# Patient Record
Sex: MALE | Race: BLACK OR AFRICAN AMERICAN | HISPANIC OR LATINO | Employment: STUDENT | URBAN - METROPOLITAN AREA
[De-identification: names, ages, dates, MRNs, and addresses within clinical notes are randomized per-mention and may not be internally consistent; named-entity substitution may affect disease eponyms.]

---

## 2017-08-19 ENCOUNTER — HOSPITAL ENCOUNTER (EMERGENCY)
Facility: HOSPITAL | Age: 22
Discharge: PSYCHIATRIC OR SUBSTANCE USE FACILITY | End: 2017-08-20
Attending: EMERGENCY MEDICINE
Payer: MEDICAID

## 2017-08-19 DIAGNOSIS — F41.1 GENERALIZED ANXIETY DISORDER: Primary | ICD-10-CM

## 2017-08-19 LAB
ALBUMIN SERPL BCP-MCNC: 4.9 G/DL
ALP SERPL-CCNC: 77 U/L
ALT SERPL W/O P-5'-P-CCNC: 17 U/L
AMPHET+METHAMPHET UR QL: NEGATIVE
ANION GAP SERPL CALC-SCNC: 10 MMOL/L
APAP SERPL-MCNC: <3 UG/ML
AST SERPL-CCNC: 27 U/L
BACTERIA #/AREA URNS AUTO: ABNORMAL /HPF
BARBITURATES UR QL SCN>200 NG/ML: NEGATIVE
BASOPHILS # BLD AUTO: 0.02 K/UL
BASOPHILS NFR BLD: 0.2 %
BENZODIAZ UR QL SCN>200 NG/ML: NEGATIVE
BILIRUB SERPL-MCNC: 1.1 MG/DL
BILIRUB UR QL STRIP: NEGATIVE
BUN SERPL-MCNC: 12 MG/DL
BZE UR QL SCN: NEGATIVE
CALCIUM SERPL-MCNC: 9.9 MG/DL
CANNABINOIDS UR QL SCN: ABNORMAL
CHLORIDE SERPL-SCNC: 105 MMOL/L
CLARITY UR REFRACT.AUTO: CLEAR
CO2 SERPL-SCNC: 25 MMOL/L
COLOR UR AUTO: ABNORMAL
CREAT SERPL-MCNC: 1.2 MG/DL
CREAT UR-MCNC: >450 MG/DL
DIFFERENTIAL METHOD: ABNORMAL
EOSINOPHIL # BLD AUTO: 0 K/UL
EOSINOPHIL NFR BLD: 0.3 %
ERYTHROCYTE [DISTWIDTH] IN BLOOD BY AUTOMATED COUNT: 12.8 %
EST. GFR  (AFRICAN AMERICAN): >60 ML/MIN/1.73 M^2
EST. GFR  (NON AFRICAN AMERICAN): >60 ML/MIN/1.73 M^2
ETHANOL SERPL-MCNC: <10 MG/DL
GLUCOSE SERPL-MCNC: 73 MG/DL
GLUCOSE UR QL STRIP: NEGATIVE
HCT VFR BLD AUTO: 41.9 %
HGB BLD-MCNC: 13.8 G/DL
HGB UR QL STRIP: NEGATIVE
HYALINE CASTS UR QL AUTO: 7 /LPF
KETONES UR QL STRIP: ABNORMAL
LEUKOCYTE ESTERASE UR QL STRIP: NEGATIVE
LYMPHOCYTES # BLD AUTO: 2.3 K/UL
LYMPHOCYTES NFR BLD: 24.6 %
MCH RBC QN AUTO: 29.2 PG
MCHC RBC AUTO-ENTMCNC: 32.9 G/DL
MCV RBC AUTO: 89 FL
METHADONE UR QL SCN>300 NG/ML: NEGATIVE
MICROSCOPIC COMMENT: ABNORMAL
MONOCYTES # BLD AUTO: 0.6 K/UL
MONOCYTES NFR BLD: 6.3 %
NEUTROPHILS # BLD AUTO: 6.4 K/UL
NEUTROPHILS NFR BLD: 68.4 %
NITRITE UR QL STRIP: NEGATIVE
OPIATES UR QL SCN: NEGATIVE
PCP UR QL SCN>25 NG/ML: NEGATIVE
PH UR STRIP: 5 [PH] (ref 5–8)
PLATELET # BLD AUTO: 286 K/UL
PMV BLD AUTO: 9.7 FL
POTASSIUM SERPL-SCNC: 3.9 MMOL/L
PROT SERPL-MCNC: 8.4 G/DL
PROT UR QL STRIP: ABNORMAL
RBC # BLD AUTO: 4.73 M/UL
RBC #/AREA URNS AUTO: 0 /HPF (ref 0–4)
SODIUM SERPL-SCNC: 140 MMOL/L
SP GR UR STRIP: >=1.03 (ref 1–1.03)
TOXICOLOGY INFORMATION: ABNORMAL
TSH SERPL DL<=0.005 MIU/L-ACNC: 1.16 UIU/ML
URN SPEC COLLECT METH UR: ABNORMAL
UROBILINOGEN UR STRIP-ACNC: 2 EU/DL
WBC # BLD AUTO: 9.42 K/UL
WBC #/AREA URNS AUTO: 2 /HPF (ref 0–5)

## 2017-08-19 PROCEDURE — 80329 ANALGESICS NON-OPIOID 1 OR 2: CPT

## 2017-08-19 PROCEDURE — 25000003 PHARM REV CODE 250: Performed by: EMERGENCY MEDICINE

## 2017-08-19 PROCEDURE — 80053 COMPREHEN METABOLIC PANEL: CPT

## 2017-08-19 PROCEDURE — 81001 URINALYSIS AUTO W/SCOPE: CPT

## 2017-08-19 PROCEDURE — 85025 COMPLETE CBC W/AUTO DIFF WBC: CPT

## 2017-08-19 PROCEDURE — 80320 DRUG SCREEN QUANTALCOHOLS: CPT

## 2017-08-19 PROCEDURE — 84443 ASSAY THYROID STIM HORMONE: CPT

## 2017-08-19 PROCEDURE — 80307 DRUG TEST PRSMV CHEM ANLYZR: CPT

## 2017-08-19 RX ORDER — HYDROXYZINE PAMOATE 25 MG/1
50 CAPSULE ORAL
Status: COMPLETED | OUTPATIENT
Start: 2017-08-19 | End: 2017-08-19

## 2017-08-19 RX ADMIN — HYDROXYZINE PAMOATE 50 MG: 25 CAPSULE ORAL at 11:08

## 2017-08-19 NOTE — ED NOTES
Placed in chair , ems hallway to wait for bed and strict visual contact, security aware, [place] pct sitting with pt

## 2017-08-20 VITALS
RESPIRATION RATE: 18 BRPM | BODY MASS INDEX: 30.48 KG/M2 | WEIGHT: 225 LBS | HEIGHT: 72 IN | OXYGEN SATURATION: 98 % | HEART RATE: 60 BPM | DIASTOLIC BLOOD PRESSURE: 52 MMHG | SYSTOLIC BLOOD PRESSURE: 103 MMHG | TEMPERATURE: 99 F

## 2017-08-20 PROCEDURE — 99285 EMERGENCY DEPT VISIT HI MDM: CPT

## 2017-08-20 PROCEDURE — 99285 EMERGENCY DEPT VISIT HI MDM: CPT | Mod: ,,, | Performed by: EMERGENCY MEDICINE

## 2017-08-20 NOTE — ED NOTES
Patient transferred to [facility] with Er nurse and Security with 2 bags of belonging. Patient searched for contraband.  called and informed of transfer to [facility].

## 2017-08-20 NOTE — ED NOTES
Patient denies SI, HI, V/A hallucinations. He did wish not to be here . He reports his depression 6/10. Svc maintained.

## 2017-08-20 NOTE — CONSULTS
8/19/2017 11:32 PM  Price Godoy  MRN: [id number]    Emergency Psychiatry Consult Note      Chief Complaint / Reason for Consult: anxiety and substance abuse     History of Present Illness:   Price Godoy is a 22 y.o. male with past psychiatric history of Depression and Psychosis who presented to the [facility] ED due to Anxiety and Depression.      Per ED Provider: The patient is a 22 y.o. male with hx of: depression and anxiety that presents to the ED with a complaint of worsening anxiety and feelings of hopelessness. He reports EtOH 7 hours ago, but denies other substance abuse, recent increased EtOH, suicidal ideation, fever, chills, chest pain, vomiting, shortness of breath, diarrhea, constipation, rashes, and thyroid problems. There is no significant event that lead to today's symptoms. He last saw his psychiatrist 3-4 months ago, and hasn't been taking his prescribed ativan and Prozac for more than 3 months. He is a smoker.    My Interview: Patient was seen sitting in hospital bed on my approach. He reports that he came to the hospital because he has been feeling very depressed and anxious for the past week and he is desperate for help. He reports that he attempted to go to [facility] however their were no open beds. Patient reports that he has been stressed because he is going to be evicted from his parents home at the end of the month with his mother. He also reports that he has not been able to see his outpatient psychiatrist in [employer] for the past 3 months. He recently moved to the [facility] and he has lost psychiatric contact and run out of his medications. Patient is unable to report what medications he was previously on during my interview. Patient reports that he has been having thoughts of hurting people and wanting to gillian someone because he is desperate for money.    Patient reports a past psychiatric history of depression and psychosis. He has been hospitalized at [facility] and [facility] in the  past. Patient reports a history of suicide attempts via overdose and cutting his wrist in the past. He is currently endorsing a depressed and anxious mood and having difficulty sleeping. He denies SI/HI/AVH at this time.   Collateral:   None Provided    SUBJECTIVE:     Medical Review Of Systems:  Pertinent items are noted in HPI.    Psychiatric Review Of Systems - Is patient experiencing or having changes in:  sleep: yes  appetite: no  weight: no  energy/anergy: no  interest/pleasure/anhedonia: no  somatic symptoms: no  libido: no  anxiety/panic: yes  guilty/hopelessness: no  concentration: no  S.I.B.s/risky behavior: no  any drugs: yes  alcohol: no     Past Medical History:   Diagnosis Date    Anxiety     Depression        Past Surgical History:   Procedure Laterality Date    APPENDECTOMY         Social History     Social History    Marital status: Single     Spouse name: N/A    Number of children: N/A    Years of education: N/A     Social History Main Topics    Smoking status: Light Tobacco Smoker     Types: Cigarettes    Smokeless tobacco: Never Used    Alcohol use 0.6 oz/week     1 Cans of beer per week      Comment: rarely    Drug use:      Types: Marijuana      Comment: marijuana every other day - ecstasy 1 time 4-5 months ago    Sexual activity: No     Other Topics Concern    None     Social History Narrative    None       No current facility-administered medications for this encounter.      Current Outpatient Prescriptions   Medication Sig Dispense Refill    diphenhydrAMINE (BENADRYL) 25 mg capsule Take 1 each (25 mg total) by mouth every 6 (six) hours as needed (face twisting). 20 capsule 0    famotidine (PEPCID) 20 MG tablet Take 1 tablet (20 mg total) by mouth once daily. 20 tablet 2    fluoxetine (PROZAC) 20 MG capsule Take 20 mg by mouth once daily.      lorazepam (ATIVAN) 1 MG tablet Take 0.5 tablets (0.5 mg total) by mouth every 6 (six) hours as needed for Anxiety. 10 tablet 0  "      Review of patient's allergies indicates:   Allergen Reactions    Amoxicillin        Scheduled Meds:      Psychotherapeutics     None        Psychiatric History:   Hospitalization: Yes for depression and ? psychosis  Medication Trials: Yes- Haldol and Lexapro  Suicide Attempts: yes and most recent: OD; slit wrists at 21  Violence: denies  Depression: h/o depression, says never formally diagnosed, has been feeling depressed since April  Maria Alejandra: denies  AH's: denies  Delusions: denies    Substance Abuse History:   Alchohol: every 2-3 weeks ~2 drinks  Drug: urine tox positive for THC last smoked 1 week ago; denies MOJO     Legal History:   Past charges/incarcerations: placed in holding cell not in correction  Pending charges: speeding tickets, seat belts, suspended drivers license     Family Psychiatric History: Uncle with Schizophrenia; Brother in hospital for "something like Schizophrenia"     Social History:   History of Physical/Sexual Abuse: denies  Education: has attended 1.5 years total of college    Employment/Disability: landscaping   Relationship Status/Sexual Orientation: denies   Children: none   Housing Status: [facility] with [facility]  Caodaism: Zoroastrianism, Baptist "every now and then"   History: denies   Recreational Activities: time with family, draw, go to movies  Access to Gun: denies       OBJECTIVE:     Vitals:    08/19/17 2251   BP: (!) 123/58   Pulse: 70   Resp: 18   Temp: 98.7 °F (37.1 °C)         Labs within the past 24 hours have been reviewed.  No results found for: LITHIUM, PHENYTOIN, PHENOBARB, VALPROATE, CBMZ  Urinalysis    Recent Labs  Lab 08/19/17  1947   COLORU Luz   SPECGRAV >=1.030*   PHUR 5.0   PROTEINUA 1+*   BACTERIA Rare   NITRITE Negative   LEUKOCYTESUR Negative   UROBILINOGEN 2.0   HYALINECASTS 7*     @[id number](poctglucose)@    Mental Status Exam:  Appearance: unremarkable, age appropriate  Behavior/Cooperation:  normal, cooperative  Speech:  normal tone, " normal rate, normal pitch, normal volume  Mood: anxious, depressed  Affect:  Flat  Thought Process: normal and logical  Thought Content: normal, no suicidality, no homicidality, delusions, or paranoia  Sensorium:  Intact   Alert and Oriented: grossly intact  Attention/concentration: Intact to conversation  Abstract reasoning:  Intact  Insight:  Intact  Judgment:  Intact      ASSESSMENT:     Impression:  MDD  R/O Mood disorder w/ psychotic features  THC use disorder      RECOMMENDATIONS      Recommend PEC for grave disability as patient has been off of his medicines for 3 months and is desperate to be hospitalized. Patient currently reporting that he has been considering robbery and he has a history of suicide attempts in the past.    1. Scheduled Medication Recommendations:  Vistaril 50 mg QHS  Will defer medication recommendations to inpatient team    2. PRN Recommendations:  None    3.  Monitor:  Please obtain daily EKG to monitor QTc    4. Legal Status/Precautions: Continue PEC-CEC as pt is gravely disabled      Case discussed with: Dr. Love     Thank you for this consult.  Will continue to follow.          Devon Carter DO  [facility], PGY2  Pager [phone]

## 2017-08-20 NOTE — ED TRIAGE NOTES
"Pt arrived to ED with CC of depression and anxiety. Denies SI/HI. Pt reports hx of self harm by cutting wrists over 1 year ago.  Pt states "Sometimes when I'm driving, I feel like I shouldn't be driving because I might do something to hurt myself."    Denies any physical complaints. Denies pain.  "

## 2017-08-20 NOTE — ED NOTES
Staff faxed pt's admission packet to [facility],[place],[facility], [facility]

## 2017-08-20 NOTE — ED NOTES
Patient transferred to [facility] by [facility] unit [facility] with 2 bags of belongings. Security present for transfer . Report was called to nurse Haywood with [facility]. Patient did not want contact person called to inform of transfer. Vitals stable. SVC maintained.

## 2017-08-20 NOTE — ED PROVIDER NOTES
Encounter Date: 8/19/2017    SCRIBE #1 NOTE: I, Adina aJckson, am scribing for, and in the presence of,  Dr. Gan. I have scribed the entire note.       History     Chief Complaint   Patient presents with    Psychiatric Evaluation     and anxiety, denies suicidal/homicidal ideation, hx of suicidal attempt     Time patient was seen by the provider: 7:45 PM      The patient is a 22 y.o. male with hx of: depression and anxiety that presents to the ED with a complaint of worsening anxiety and feelings of hopelessness. He reports EtOH 7 hours ago, but denies other substance abuse, recent increased EtOH, suicidal ideation, fever, chills, chest pain, vomiting, shortness of breath, diarrhea, constipation, rashes, and thyroid problems. There is no significant event that lead to today's symptoms. He last saw his psychiatrist 3-4 months ago, and hasn't been taking his prescribed ativan and Prozac for more than 3 months. He is a smoker.        The history is provided by the patient.     Review of patient's allergies indicates:   Allergen Reactions    Amoxicillin      Past Medical History:   Diagnosis Date    Anxiety     Depression      Past Surgical History:   Procedure Laterality Date    APPENDECTOMY       Family History   Problem Relation Age of Onset    Mental illness Brother     Schizophrenia Maternal Uncle     Diabetes Maternal Grandmother     Diabetes Paternal Grandmother      Social History   Substance Use Topics    Smoking status: Light Tobacco Smoker     Types: Cigarettes    Smokeless tobacco: Never Used    Alcohol use 0.6 oz/week     1 Cans of beer per week      Comment: rarely     Review of Systems   Constitutional: Negative for chills and fever.   HENT: Negative for sore throat.    Respiratory: Negative for shortness of breath.    Cardiovascular: Negative for chest pain.   Gastrointestinal: Negative for anal bleeding, blood in stool, diarrhea, nausea, rectal pain and vomiting.   Genitourinary:  Negative for dysuria.   Musculoskeletal: Negative for back pain.   Skin: Negative for rash.   Neurological: Negative for weakness.   Hematological: Does not bruise/bleed easily.   Psychiatric/Behavioral: Positive for dysphoric mood. Negative for suicidal ideas.        Anxiety       Physical Exam     Initial Vitals [08/19/17 1835]   BP Pulse Resp Temp SpO2   (!) 118/58 67 18 99.1 °F (37.3 °C) 98 %      MAP       78         Physical Exam    Constitutional:   22 y.o.  man. Cooperative, hygiene intact   HENT:   Head: Normocephalic and atraumatic.   No intraoral lesions   Eyes: EOM are normal. Pupils are equal, round, and reactive to light.   Neck: No tracheal deviation present.   Cardiovascular: Normal rate, regular rhythm, normal heart sounds and intact distal pulses.   Pulmonary/Chest: Breath sounds normal. No stridor. No respiratory distress.   Abdominal: Soft. He exhibits no distension. There is no tenderness.   Musculoskeletal: He exhibits no edema.   Moving all extremities x4   Neurological: He is alert and oriented to person, place, and time.   Psychiatric:   Flat affect, denies suicidal ideation, homicidal ideation, and audio or visual hallucinations.         ED Course   Procedures  Labs Reviewed   CBC W/ AUTO DIFFERENTIAL - Abnormal; Notable for the following:        Result Value    Hemoglobin 13.8 (*)     All other components within normal limits   COMPREHENSIVE METABOLIC PANEL - Abnormal; Notable for the following:     Total Bilirubin 1.1 (*)     All other components within normal limits   URINALYSIS - Abnormal; Notable for the following:     Specific Gravity, UA >=1.030 (*)     Protein, UA 1+ (*)     Ketones, UA Trace (*)     All other components within normal limits    Narrative:     EXTRA GREY URINE TUBE   DRUG SCREEN PANEL, URINE EMERGENCY - Abnormal; Notable for the following:     Creatinine, Random Ur >450.0 (*)     All other components within normal limits    Narrative:     EXTRA GREY  URINE TUBE   ACETAMINOPHEN LEVEL - Abnormal; Notable for the following:     Acetaminophen (Tylenol), Serum <3.0 (*)     All other components within normal limits   URINALYSIS MICROSCOPIC - Abnormal; Notable for the following:     Hyaline Casts, UA 7 (*)     All other components within normal limits    Narrative:     EXTRA GREY URINE TUBE   TSH   ALCOHOL,MEDICAL (ETHANOL)             Medical Decision Making:   History:   Old Medical Records: I decided to obtain old medical records.  Differential Diagnosis:   Acute psychosis, anxiety disorder, substance induced mood disorder, thyroid disorder  Clinical Tests:   Lab Tests: Ordered and Reviewed            Scribe Attestation:   Scribe #1: I performed the above scribed service and the documentation accurately describes the services I performed. I attest to the accuracy of the note.    Attending Attestation:           Physician Attestation for Scribe:  Physician Attestation Statement for Scribe #1: I, Dr. Gan, reviewed documentation, as scribed by Adina Jackson in my presence, and it is both accurate and complete.         Attending ED Notes:   Emergency department evaluation today reveals the presence of mild dehydration only without associated solid organ injury.  The patient has undergone urgent evaluation by psychiatry on call who feels that the patient's anxiety is disabling to a degree to require inpatient therapy.  A physicians emergency committal has been completed by me for grave disability.  The patient is medically clear for inpatient psychiatric management.          ED Course     Clinical Impression:   The encounter diagnosis was Generalized anxiety disorder.    Disposition:   Disposition: Transferred  Condition: Stable                        Rajinder Gan MD  08/19/17 9413

## 2017-11-09 ENCOUNTER — HOSPITAL ENCOUNTER (EMERGENCY)
Facility: HOSPITAL | Age: 22
Discharge: HOME | End: 2017-11-09
Attending: EMERGENCY MEDICINE
Payer: MEDICAID

## 2017-11-09 VITALS
DIASTOLIC BLOOD PRESSURE: 66 MMHG | SYSTOLIC BLOOD PRESSURE: 120 MMHG | RESPIRATION RATE: 16 BRPM | BODY MASS INDEX: 30.1 KG/M2 | HEART RATE: 59 BPM | OXYGEN SATURATION: 97 % | WEIGHT: 215 LBS | HEIGHT: 71 IN | TEMPERATURE: 98 F

## 2017-11-09 DIAGNOSIS — R25.1 SHAKING: Primary | ICD-10-CM

## 2017-11-09 LAB
BUN SERPL-MCNC: 10 MG/DL (ref 6–30)
CHLORIDE SERPL-SCNC: 102 MMOL/L (ref 95–110)
CREAT SERPL-MCNC: 0.9 MG/DL (ref 0.5–1.4)
GLUCOSE SERPL-MCNC: 94 MG/DL (ref 70–110)
HCT VFR BLD CALC: 43 %PCV (ref 36–54)
POC IONIZED CALCIUM: 1.17 MMOL/L (ref 1.06–1.42)
POC TCO2 (MEASURED): 24 MMOL/L (ref 23–29)
POTASSIUM BLD-SCNC: 3.8 MMOL/L (ref 3.5–5.1)
SAMPLE: NORMAL
SODIUM BLD-SCNC: 140 MMOL/L (ref 136–145)

## 2017-11-09 PROCEDURE — 93010 ELECTROCARDIOGRAM REPORT: CPT | Mod: ,,, | Performed by: INTERNAL MEDICINE

## 2017-11-09 PROCEDURE — 99284 EMERGENCY DEPT VISIT MOD MDM: CPT | Mod: 25

## 2017-11-09 PROCEDURE — 99282 EMERGENCY DEPT VISIT SF MDM: CPT | Mod: ,,,

## 2017-11-09 PROCEDURE — 93005 ELECTROCARDIOGRAM TRACING: CPT

## 2017-11-09 NOTE — ED NOTES
"Pt stated "stopped taking Geodon 3-4 months ago because relocated and dont have a dr". Pt has some twitching of his head but is able to control it while speaking on phone to his father. When not speaking or attempting to concentrate pt has shaking to head and infrequently shoulders. Pt reports "not sure if still taking Geodon, and think was also taking Ativan, 1 was for anxiety and 1 was to slow my mind racing".   "

## 2017-11-09 NOTE — ED NOTES
"Pts adls are done by himself as he has a job, his own apartment, and a automobile of his own. He also reports he has been seen in this ER and was sent to [facility] and while there he reports the dr told him "twitching from his nerves". While speaking with pt he continously rubs head with left hand and looks up towards ceiling when speaking.  pt denies si/hi/ah/vh.   "

## 2017-11-09 NOTE — ED NOTES
Pt sitting up in bed eating provided meal in nad, no twitching or shaking noted await test results

## 2019-07-25 ENCOUNTER — HOSPITAL ENCOUNTER (EMERGENCY)
Facility: HOSPITAL | Age: 24
Discharge: PSYCHIATRIC OR SUBSTANCE USE FACILITY | End: 2019-07-25
Attending: EMERGENCY MEDICINE
Payer: MEDICAID

## 2019-07-25 VITALS
TEMPERATURE: 98 F | HEART RATE: 62 BPM | BODY MASS INDEX: 37.93 KG/M2 | HEIGHT: 72 IN | SYSTOLIC BLOOD PRESSURE: 123 MMHG | WEIGHT: 280 LBS | OXYGEN SATURATION: 97 % | RESPIRATION RATE: 16 BRPM | DIASTOLIC BLOOD PRESSURE: 68 MMHG

## 2019-07-25 DIAGNOSIS — Z86.59 HISTORY OF SCHIZOPHRENIA: ICD-10-CM

## 2019-07-25 DIAGNOSIS — Z91.148 NONCOMPLIANCE WITH MEDICATIONS: ICD-10-CM

## 2019-07-25 DIAGNOSIS — R25.1 OCCASIONAL TREMORS: ICD-10-CM

## 2019-07-25 DIAGNOSIS — R45.851 SUICIDAL IDEATION: Primary | ICD-10-CM

## 2019-07-25 PROBLEM — F32.9 MAJOR DEPRESSION: Status: ACTIVE | Noted: 2019-07-25

## 2019-07-25 LAB
ALBUMIN SERPL BCP-MCNC: 4.9 G/DL (ref 3.5–5.2)
ALP SERPL-CCNC: 79 U/L (ref 55–135)
ALT SERPL W/O P-5'-P-CCNC: 12 U/L (ref 10–44)
AMPHET+METHAMPHET UR QL: NEGATIVE
ANION GAP SERPL CALC-SCNC: 12 MMOL/L (ref 8–16)
APAP SERPL-MCNC: <3 UG/ML (ref 10–20)
AST SERPL-CCNC: 15 U/L (ref 10–40)
BARBITURATES UR QL SCN>200 NG/ML: NEGATIVE
BASOPHILS # BLD AUTO: 0.02 K/UL (ref 0–0.2)
BASOPHILS NFR BLD: 0.3 % (ref 0–1.9)
BENZODIAZ UR QL SCN>200 NG/ML: NEGATIVE
BILIRUB SERPL-MCNC: 0.9 MG/DL (ref 0.1–1)
BILIRUB UR QL STRIP: NEGATIVE
BUN SERPL-MCNC: 12 MG/DL (ref 6–20)
BZE UR QL SCN: NEGATIVE
CALCIUM SERPL-MCNC: 10.3 MG/DL (ref 8.7–10.5)
CANNABINOIDS UR QL SCN: NORMAL
CHLORIDE SERPL-SCNC: 106 MMOL/L (ref 95–110)
CK SERPL-CCNC: 230 U/L (ref 20–200)
CK SERPL-CCNC: 283 U/L (ref 20–200)
CLARITY UR: CLEAR
CO2 SERPL-SCNC: 20 MMOL/L (ref 23–29)
COLOR UR: YELLOW
CREAT SERPL-MCNC: 1 MG/DL (ref 0.5–1.4)
CREAT UR-MCNC: 287.9 MG/DL (ref 23–375)
DIFFERENTIAL METHOD: ABNORMAL
EOSINOPHIL # BLD AUTO: 0.1 K/UL (ref 0–0.5)
EOSINOPHIL NFR BLD: 2.1 % (ref 0–8)
ERYTHROCYTE [DISTWIDTH] IN BLOOD BY AUTOMATED COUNT: 12.5 % (ref 11.5–14.5)
EST. GFR  (AFRICAN AMERICAN): >60 ML/MIN/1.73 M^2
EST. GFR  (NON AFRICAN AMERICAN): >60 ML/MIN/1.73 M^2
ETHANOL SERPL-MCNC: <10 MG/DL
GLUCOSE SERPL-MCNC: 88 MG/DL (ref 70–110)
GLUCOSE UR QL STRIP: NEGATIVE
HCT VFR BLD AUTO: 41 % (ref 40–54)
HGB BLD-MCNC: 13.5 G/DL (ref 14–18)
HGB UR QL STRIP: NEGATIVE
KETONES UR QL STRIP: ABNORMAL
LEUKOCYTE ESTERASE UR QL STRIP: NEGATIVE
LYMPHOCYTES # BLD AUTO: 2.3 K/UL (ref 1–4.8)
LYMPHOCYTES NFR BLD: 36.1 % (ref 18–48)
MCH RBC QN AUTO: 28.1 PG (ref 27–31)
MCHC RBC AUTO-ENTMCNC: 32.9 G/DL (ref 32–36)
MCV RBC AUTO: 85 FL (ref 82–98)
METHADONE UR QL SCN>300 NG/ML: NEGATIVE
MONOCYTES # BLD AUTO: 0.6 K/UL (ref 0.3–1)
MONOCYTES NFR BLD: 9.2 % (ref 4–15)
NEUTROPHILS # BLD AUTO: 3.3 K/UL (ref 1.8–7.7)
NEUTROPHILS NFR BLD: 52.3 % (ref 38–73)
NITRITE UR QL STRIP: NEGATIVE
OPIATES UR QL SCN: NEGATIVE
PCP UR QL SCN>25 NG/ML: NEGATIVE
PH UR STRIP: 7 [PH] (ref 5–8)
PLATELET # BLD AUTO: 316 K/UL (ref 150–350)
PMV BLD AUTO: 9.5 FL (ref 9.2–12.9)
POTASSIUM SERPL-SCNC: 3.7 MMOL/L (ref 3.5–5.1)
PROT SERPL-MCNC: 8 G/DL (ref 6–8.4)
PROT UR QL STRIP: NEGATIVE
RBC # BLD AUTO: 4.81 M/UL (ref 4.6–6.2)
SALICYLATES SERPL-MCNC: <5 MG/DL (ref 15–30)
SODIUM SERPL-SCNC: 138 MMOL/L (ref 136–145)
SP GR UR STRIP: 1.01 (ref 1–1.03)
T4 FREE SERPL-MCNC: 1.14 NG/DL (ref 0.71–1.51)
TOXICOLOGY INFORMATION: NORMAL
TSH SERPL DL<=0.005 MIU/L-ACNC: 0.39 UIU/ML (ref 0.4–4)
URN SPEC COLLECT METH UR: ABNORMAL
UROBILINOGEN UR STRIP-ACNC: 1 EU/DL
WBC # BLD AUTO: 6.31 K/UL (ref 3.9–12.7)

## 2019-07-25 PROCEDURE — 84439 ASSAY OF FREE THYROXINE: CPT

## 2019-07-25 PROCEDURE — 99285 EMERGENCY DEPT VISIT HI MDM: CPT | Mod: 25

## 2019-07-25 PROCEDURE — 80320 DRUG SCREEN QUANTALCOHOLS: CPT

## 2019-07-25 PROCEDURE — 85025 COMPLETE CBC W/AUTO DIFF WBC: CPT

## 2019-07-25 PROCEDURE — 93005 ELECTROCARDIOGRAM TRACING: CPT

## 2019-07-25 PROCEDURE — 80053 COMPREHEN METABOLIC PANEL: CPT

## 2019-07-25 PROCEDURE — 80307 DRUG TEST PRSMV CHEM ANLYZR: CPT

## 2019-07-25 PROCEDURE — 96360 HYDRATION IV INFUSION INIT: CPT

## 2019-07-25 PROCEDURE — 82550 ASSAY OF CK (CPK): CPT

## 2019-07-25 PROCEDURE — 80329 ANALGESICS NON-OPIOID 1 OR 2: CPT

## 2019-07-25 PROCEDURE — 63600175 PHARM REV CODE 636 W HCPCS: Performed by: EMERGENCY MEDICINE

## 2019-07-25 PROCEDURE — 81003 URINALYSIS AUTO W/O SCOPE: CPT | Mod: 59

## 2019-07-25 PROCEDURE — 84443 ASSAY THYROID STIM HORMONE: CPT

## 2019-07-25 PROCEDURE — 25000003 PHARM REV CODE 250: Performed by: EMERGENCY MEDICINE

## 2019-07-25 RX ORDER — LORAZEPAM 1 MG/1
1 TABLET ORAL
Status: COMPLETED | OUTPATIENT
Start: 2019-07-25 | End: 2019-07-25

## 2019-07-25 RX ORDER — BENZTROPINE MESYLATE 2 MG/1
2 TABLET ORAL 2 TIMES DAILY
Status: ON HOLD | COMMUNITY
End: 2019-09-22 | Stop reason: SDUPTHER

## 2019-07-25 RX ORDER — DIPHENHYDRAMINE HYDROCHLORIDE 50 MG/ML
25 INJECTION INTRAMUSCULAR; INTRAVENOUS
Status: DISCONTINUED | OUTPATIENT
Start: 2019-07-25 | End: 2019-07-25

## 2019-07-25 RX ORDER — SODIUM CHLORIDE 9 MG/ML
1000 INJECTION, SOLUTION INTRAVENOUS
Status: COMPLETED | OUTPATIENT
Start: 2019-07-25 | End: 2019-07-25

## 2019-07-25 RX ADMIN — SODIUM CHLORIDE 1000 ML: 0.9 INJECTION, SOLUTION INTRAVENOUS at 02:07

## 2019-07-25 RX ADMIN — LORAZEPAM 1 MG: 1 TABLET ORAL at 11:07

## 2019-07-25 NOTE — PLAN OF CARE
CPK trending downward appropriately after administration of fluids. CPK not at level of overt rhabdomyolysis. Tremors improved with PO Ativan. No emergent intervention required at this time.     Remainder of laboratory workup unremarkable for findings needing acute intervention.    Pt medically cleared from EM perspective; ok to transfer to psychiatric facility.    Gera Lange MD

## 2019-07-25 NOTE — ED NOTES
Fariba---sitter to bedside for risk sitting; pt has his bible; clothing, cell phone,, id card x2 and meds x 2 labeled and locked in closet; pt alert and sitting on side of bed; cooperative

## 2019-07-25 NOTE — ED NOTES
Patient roomed per stretcher per ems; pt is aao; pt has occassional tremors/twitching and mostly left arm; pt cries on occasion; pt is alert ,oriented and answers appropriately; pt is cooperative at present; pt denies si at present or HI; pt reports got angry with his self this morning at home and punched the wall; pt has abrasions to his right knuckles; pt is holding his bible; meds at desk with staff and pt reports he took his 2 rx meds this morning and smoked a hodge; nurse in room with pt close to nurses station; bed low locked and rails up; risk sitter requested and awaiting provider for assessment and orders

## 2019-07-25 NOTE — ED PROVIDER NOTES
"Encounter Date: 7/25/2019    SCRIBE #1 NOTE: I, Randi Lara, am scribing for, and in the presence of,  Dr. Lange. I have scribed the entire note.       History     Chief Complaint   Patient presents with    Tremors     pt presents to ED today via EMS who reports mother called reports pt is non compliant with psyc meds since Jan and being combative. pt prescribed prozac and benztropine. pt did take medication today . Pt states " sometimes I want to kill myself"      Price Godoy is a 24 y.o. male who  has a past medical history of Anxiety and Depression.    The patient presents to the ED due to tremors that started at 0800. Patient reports his tremors start in his neck and go down to his body, arms and lower extremities. Patient reports he punched his windshield this morning because he was angry from his uncontrollable tremors. He states he has been stressed recently, so he hasn't been sleeping much. Patient reports history of tremors and schizophrenia, but has not been compliant with his tremor and psych medication. He denies SI, HI, AVH, tingling, pain, or risk taking behaviors.     The history is provided by the patient.     Review of patient's allergies indicates:   Allergen Reactions    Amoxicillin      Past Medical History:   Diagnosis Date    Anxiety     Depression      Past Surgical History:   Procedure Laterality Date    APPENDECTOMY      APPENDECTOMY-LAPAROSCOPIC N/A 5/15/2015    Performed by Aditya Jade MD at [facility]     Family History   Problem Relation Age of Onset    Mental illness Brother     Schizophrenia Maternal Uncle     Diabetes Maternal Grandmother     Diabetes Paternal Grandmother      Social History     Tobacco Use    Smoking status: Light Tobacco Smoker     Types: Cigarettes    Smokeless tobacco: Never Used   Substance Use Topics    Alcohol use: Yes     Alcohol/week: 0.6 oz     Types: 1 Cans of beer per week     Comment: rarely    Drug use: Yes     Types: " "Marijuana     Comment: marijuana every other day - ecstasy 1 time 4-5 months ago     Review of Systems   Constitutional: Negative for chills and fever.   HENT: Negative for facial swelling and trouble swallowing.    Eyes: Negative for redness.   Respiratory: Negative for shortness of breath.    Cardiovascular: Negative for chest pain.   Gastrointestinal: Negative for abdominal pain, diarrhea, nausea and vomiting.   Genitourinary: Negative for dysuria and hematuria.   Musculoskeletal: Negative for gait problem.   Skin: Negative for rash.   Neurological: Positive for tremors. Negative for facial asymmetry and speech difficulty.       Physical Exam     Initial Vitals [07/25/19 0958]   BP Pulse Resp Temp SpO2   127/60 71 (!) 22 97.7 °F (36.5 °C) 97 %      MAP       --         Physical Exam    Nursing note and vitals reviewed.  Constitutional: He appears well-developed and well-nourished. He is not diaphoretic. No distress.   HENT:   Head: Normocephalic and atraumatic.   Mouth/Throat: Oropharynx is clear and moist.   Eyes: Conjunctivae and EOM are normal.   Neck: Normal range of motion. Neck supple.   Cardiovascular: Normal rate, regular rhythm and normal heart sounds.   Pulmonary/Chest: Breath sounds normal. No respiratory distress.   Abdominal: Soft. There is no tenderness.   Musculoskeletal: Normal range of motion. He exhibits no edema or tenderness.   No swelling or decreased ROM to R hand; no bony tenderness; 2pt discrimination WNL; 2+ radial pulse; pt able to make "OK" and "thumbs up" sign   Neurological: He is alert and oriented to person, place, and time. He has normal strength.   Occasional tremors involving neck and head; no seizure-like activity noted on exam    Skin: Skin is warm and dry.   Abrasion to dorsum of right hand and distal joints.          ED Course   Procedures  Labs Reviewed   CBC W/ AUTO DIFFERENTIAL - Abnormal; Notable for the following components:       Result Value    Hemoglobin 13.5 (*)     " All other components within normal limits   COMPREHENSIVE METABOLIC PANEL - Abnormal; Notable for the following components:    CO2 20 (*)     All other components within normal limits   TSH - Abnormal; Notable for the following components:    TSH 0.390 (*)     All other components within normal limits   URINALYSIS, REFLEX TO URINE CULTURE - Abnormal; Notable for the following components:    Ketones, UA 3+ (*)     All other components within normal limits    Narrative:     Preferred Collection Type->Urine, Clean Catch   ACETAMINOPHEN LEVEL - Abnormal; Notable for the following components:    Acetaminophen (Tylenol), Serum <3.0 (*)     All other components within normal limits   SALICYLATE LEVEL - Abnormal; Notable for the following components:    Salicylate Lvl <5.0 (*)     All other components within normal limits   CK - Abnormal; Notable for the following components:     (*)     All other components within normal limits   CK - Abnormal; Notable for the following components:     (*)     All other components within normal limits    Narrative:     Please recheck after pt has received his fluids. Thanks.   DRUG SCREEN PANEL, URINE EMERGENCY    Narrative:     Preferred Collection Type->Urine, Clean Catch   ALCOHOL,MEDICAL (ETHANOL)   T4, FREE     EKG Readings: (Independently Interpreted)   Sinus rhythm rate of 65 bpm. No signifcant ST elevation or depression. No T wave inversion. W[place], QRS, and QT is within normal limits. No STEMI      ECG Results          EKG 12-lead (In process)  Result time 07/25/19 11:19:31    In process by Interface, Lab In [facility] (07/25/19 11:19:31)                 Narrative:    Test Reason : R25.1,    Vent. Rate : 065 BPM     Atrial Rate : 065 BPM     P-R Int : 160 ms          QRS Dur : 086 ms      QT Int : 406 ms       P-R-T Axes : -10 005 022 degrees     QTc Int : 422 ms    Normal sinus rhythm  Normal ECG  When compared with ECG of 09-NOV-2017 10:40,  No significant change was  found    Referred By: [email or web address]   SELF           Confirmed By:                             Imaging Results    None          Medical Decision Making:   Clinical Tests:   Lab Tests: Ordered and Reviewed  Medical Tests: Ordered and Reviewed  ED Management:  - laboratory workup unremarkable other than mild elevation in CPK which trended downward after administration of IVF  - pt tremors improved after administration of PO Ativan   - pt PEC'd as he is a danger to self, others potentially  - pt medically cleared for psych transport                         Clinical Impression:       ICD-10-CM ICD-9-CM   1. Suicidal ideation R45.851 V62.84   2. Occasional tremors R25.1 781.0   3. History of schizophrenia Z86.59 V11.0   4. Noncompliance with medications Z91.14 V15.81           I, eGra Lange,  personally performed the services described in this documentation. All medical record entries made by the scribe were at my direction and in my presence.  I have reviewed the chart and agree that the record reflects my personal performance and is accurate and complete. Gera Lange M.D. 5:15 PM07/27/2019     Gera Lange MD  07/27/19 4178

## 2019-07-25 NOTE — ED NOTES
Pt. Given regular diet tray. Pt. Is calm and cooperative without complaints. Pt. Is not having tremors presently.

## 2019-07-25 NOTE — ED NOTES
Received notification that pt. Has been accepted to [facility] to Dr. Dorman. Pt. Updated and agreeable to plan of care.

## 2019-07-25 NOTE — ED NOTES
Physician at bedside. Pt. Is calm and cooperative but is having periodic jerking tremors of generalized body. He reports tremors make him anxious and agitated.

## 2019-07-26 PROBLEM — F12.10 MILD TETRAHYDROCANNABINOL (THC) ABUSE: Status: ACTIVE | Noted: 2019-07-26

## 2019-07-26 PROBLEM — R74.8 ELEVATED CPK: Status: ACTIVE | Noted: 2019-07-26

## 2019-07-26 NOTE — ED NOTES
SPD here for transport. Pt belongings given to transport personal- including clothes, ID card, Phone, Phone , Medication bottles x2, Bible. Pt transported to [facility] without distress and is calm and cooperative.

## 2019-09-12 ENCOUNTER — HOSPITAL ENCOUNTER (EMERGENCY)
Facility: HOSPITAL | Age: 24
Discharge: PSYCHIATRIC OR SUBSTANCE USE FACILITY | End: 2019-09-12
Attending: EMERGENCY MEDICINE
Payer: MEDICAID

## 2019-09-12 VITALS
TEMPERATURE: 98 F | SYSTOLIC BLOOD PRESSURE: 123 MMHG | WEIGHT: 268.06 LBS | OXYGEN SATURATION: 95 % | DIASTOLIC BLOOD PRESSURE: 68 MMHG | BODY MASS INDEX: 37.53 KG/M2 | HEART RATE: 64 BPM | HEIGHT: 71 IN | RESPIRATION RATE: 18 BRPM

## 2019-09-12 DIAGNOSIS — R44.0 AUDITORY HALLUCINATIONS: Primary | ICD-10-CM

## 2019-09-12 DIAGNOSIS — F25.9 SCHIZOAFFECTIVE DISORDER, UNSPECIFIED TYPE: ICD-10-CM

## 2019-09-12 PROBLEM — F29 PSYCHOSIS: Status: ACTIVE | Noted: 2019-09-12

## 2019-09-12 LAB
ALBUMIN SERPL BCP-MCNC: 4.2 G/DL (ref 3.5–5.2)
ALP SERPL-CCNC: 69 U/L (ref 55–135)
ALT SERPL W/O P-5'-P-CCNC: 13 U/L (ref 10–44)
AMPHET+METHAMPHET UR QL: NEGATIVE
ANION GAP SERPL CALC-SCNC: 8 MMOL/L (ref 8–16)
APAP SERPL-MCNC: <3 UG/ML (ref 10–20)
AST SERPL-CCNC: 15 U/L (ref 10–40)
BACTERIA #/AREA URNS AUTO: NORMAL /HPF
BARBITURATES UR QL SCN>200 NG/ML: NEGATIVE
BASOPHILS # BLD AUTO: 0.04 K/UL (ref 0–0.2)
BASOPHILS NFR BLD: 0.6 % (ref 0–1.9)
BENZODIAZ UR QL SCN>200 NG/ML: NEGATIVE
BILIRUB SERPL-MCNC: 0.8 MG/DL (ref 0.1–1)
BILIRUB UR QL STRIP: NEGATIVE
BUN SERPL-MCNC: 12 MG/DL (ref 6–20)
BZE UR QL SCN: NEGATIVE
CALCIUM SERPL-MCNC: 9.3 MG/DL (ref 8.7–10.5)
CANNABINOIDS UR QL SCN: NORMAL
CHLORIDE SERPL-SCNC: 107 MMOL/L (ref 95–110)
CLARITY UR REFRACT.AUTO: CLEAR
CO2 SERPL-SCNC: 23 MMOL/L (ref 23–29)
COLOR UR AUTO: YELLOW
CREAT SERPL-MCNC: 0.8 MG/DL (ref 0.5–1.4)
CREAT UR-MCNC: 272 MG/DL (ref 23–375)
DIFFERENTIAL METHOD: ABNORMAL
EOSINOPHIL # BLD AUTO: 0.2 K/UL (ref 0–0.5)
EOSINOPHIL NFR BLD: 3.2 % (ref 0–8)
ERYTHROCYTE [DISTWIDTH] IN BLOOD BY AUTOMATED COUNT: 13 % (ref 11.5–14.5)
EST. GFR  (AFRICAN AMERICAN): >60 ML/MIN/1.73 M^2
EST. GFR  (NON AFRICAN AMERICAN): >60 ML/MIN/1.73 M^2
ETHANOL SERPL-MCNC: <10 MG/DL
GLUCOSE SERPL-MCNC: 87 MG/DL (ref 70–110)
GLUCOSE UR QL STRIP: NEGATIVE
HCT VFR BLD AUTO: 35.2 % (ref 40–54)
HGB BLD-MCNC: 11 G/DL (ref 14–18)
HGB UR QL STRIP: NEGATIVE
HYALINE CASTS UR QL AUTO: 0 /LPF
IMM GRANULOCYTES # BLD AUTO: 0.02 K/UL (ref 0–0.04)
IMM GRANULOCYTES NFR BLD AUTO: 0.3 % (ref 0–0.5)
KETONES UR QL STRIP: NEGATIVE
LEUKOCYTE ESTERASE UR QL STRIP: NEGATIVE
LYMPHOCYTES # BLD AUTO: 1.7 K/UL (ref 1–4.8)
LYMPHOCYTES NFR BLD: 26.7 % (ref 18–48)
MCH RBC QN AUTO: 29 PG (ref 27–31)
MCHC RBC AUTO-ENTMCNC: 31.3 G/DL (ref 32–36)
MCV RBC AUTO: 93 FL (ref 82–98)
METHADONE UR QL SCN>300 NG/ML: NEGATIVE
MICROSCOPIC COMMENT: NORMAL
MONOCYTES # BLD AUTO: 0.6 K/UL (ref 0.3–1)
MONOCYTES NFR BLD: 9.8 % (ref 4–15)
NEUTROPHILS # BLD AUTO: 3.7 K/UL (ref 1.8–7.7)
NEUTROPHILS NFR BLD: 59.4 % (ref 38–73)
NITRITE UR QL STRIP: NEGATIVE
NRBC BLD-RTO: 0 /100 WBC
OPIATES UR QL SCN: NEGATIVE
PCP UR QL SCN>25 NG/ML: NEGATIVE
PH UR STRIP: 8 [PH] (ref 5–8)
PLATELET # BLD AUTO: 281 K/UL (ref 150–350)
PMV BLD AUTO: 9.8 FL (ref 9.2–12.9)
POTASSIUM SERPL-SCNC: 3.7 MMOL/L (ref 3.5–5.1)
PROT SERPL-MCNC: 7.1 G/DL (ref 6–8.4)
PROT UR QL STRIP: ABNORMAL
RBC # BLD AUTO: 3.79 M/UL (ref 4.6–6.2)
RBC #/AREA URNS AUTO: 0 /HPF (ref 0–4)
SODIUM SERPL-SCNC: 138 MMOL/L (ref 136–145)
SP GR UR STRIP: 1.03 (ref 1–1.03)
T4 FREE SERPL-MCNC: 0.84 NG/DL (ref 0.71–1.51)
TOXICOLOGY INFORMATION: NORMAL
TSH SERPL DL<=0.005 MIU/L-ACNC: 0.19 UIU/ML (ref 0.4–4)
URN SPEC COLLECT METH UR: ABNORMAL
WBC # BLD AUTO: 6.21 K/UL (ref 3.9–12.7)
WBC #/AREA URNS AUTO: 2 /HPF (ref 0–5)

## 2019-09-12 PROCEDURE — 80320 DRUG SCREEN QUANTALCOHOLS: CPT

## 2019-09-12 PROCEDURE — 85025 COMPLETE CBC W/AUTO DIFF WBC: CPT

## 2019-09-12 PROCEDURE — 84439 ASSAY OF FREE THYROXINE: CPT

## 2019-09-12 PROCEDURE — 80307 DRUG TEST PRSMV CHEM ANLYZR: CPT

## 2019-09-12 PROCEDURE — 84443 ASSAY THYROID STIM HORMONE: CPT

## 2019-09-12 PROCEDURE — 99284 EMERGENCY DEPT VISIT MOD MDM: CPT | Mod: ,,, | Performed by: PHYSICIAN ASSISTANT

## 2019-09-12 PROCEDURE — 99284 PR EMERGENCY DEPT VISIT,LEVEL IV: ICD-10-PCS | Mod: ,,, | Performed by: PHYSICIAN ASSISTANT

## 2019-09-12 PROCEDURE — 99285 EMERGENCY DEPT VISIT HI MDM: CPT

## 2019-09-12 PROCEDURE — 80329 ANALGESICS NON-OPIOID 1 OR 2: CPT

## 2019-09-12 PROCEDURE — 80053 COMPREHEN METABOLIC PANEL: CPT

## 2019-09-12 PROCEDURE — 81001 URINALYSIS AUTO W/SCOPE: CPT | Mod: 59

## 2019-09-12 NOTE — ED PROVIDER NOTES
"Encounter Date: 9/12/2019       History     Chief Complaint   Patient presents with    Psychiatric Evaluation     "been having racing thought, voices in my head." Denies any current Si or HI. On meds like prescribed.      23 yo male with a history of schizoaffective disorder versus schizophrenia presents to the ED for psychiatric evaluation.  Patient reports 1 week of auditory hallucinations.  He reports hearing the voice of an unknown child saying "Mom, Dad".  Patient offers very little information.  Per nursing note, patient also reported racing thoughts.  He reports compliance with his medications including Cogentin, Prozac, Invega Trinza. Denies HI, VH, CP, SOB, n/v/d.         Review of patient's allergies indicates:   Allergen Reactions    Amoxicillin      Past Medical History:   Diagnosis Date    Anxiety     Depression      Past Surgical History:   Procedure Laterality Date    APPENDECTOMY      APPENDECTOMY-LAPAROSCOPIC N/A 5/15/2015    Performed by Aditya Jade MD at [facility] OR     Family History   Problem Relation Age of Onset    Mental illness Brother     Schizophrenia Maternal Uncle     Diabetes Maternal Grandmother     Diabetes Paternal Grandmother      Social History     Tobacco Use    Smoking status: Light Tobacco Smoker     Types: Cigarettes    Smokeless tobacco: Never Used   Substance Use Topics    Alcohol use: Yes     Alcohol/week: 0.6 oz     Types: 1 Cans of beer per week     Comment: rarely    Drug use: Yes     Types: Marijuana     Comment: marijuana every other day - ecstasy 1 time 4-5 months ago     Review of Systems   Constitutional: Negative for fever.   HENT: Negative for sore throat.    Respiratory: Negative for shortness of breath.    Cardiovascular: Negative for chest pain.   Gastrointestinal: Negative for nausea.   Genitourinary: Negative for dysuria.   Musculoskeletal: Negative for back pain.   Skin: Negative for rash.   Neurological: Negative for weakness. "   Hematological: Does not bruise/bleed easily.   Psychiatric/Behavioral: Positive for hallucinations. Negative for self-injury and suicidal ideas.        +Racing thoughts       Physical Exam     Initial Vitals [09/12/19 1037]   BP Pulse Resp Temp SpO2   137/65 72 18 98.1 °F (36.7 °C) 95 %      MAP       --         Physical Exam    Nursing note and vitals reviewed.  Constitutional: He appears well-developed and well-nourished.  Non-toxic appearance. He does not appear ill. No distress.   HENT:   Head: Normocephalic and atraumatic.   Neck: Normal range of motion. Neck supple.   Cardiovascular: Normal rate and regular rhythm. Exam reveals no gallop, no distant heart sounds and no friction rub.    No murmur heard.  Pulmonary/Chest: Effort normal and breath sounds normal. No accessory muscle usage. No tachypnea. No respiratory distress. He has no decreased breath sounds. He has no wheezes. He has no rhonchi. He has no rales.   Abdominal: Soft. He exhibits no distension and no mass. There is no tenderness. There is no guarding.   Neurological: He is alert.   Skin: No rash noted.   Psychiatric: His speech is normal and behavior is normal. His affect is blunt. He expresses no homicidal and no suicidal ideation.   Patient does not appear to be responding to internal stimuli.         ED Course   Procedures  Labs Reviewed   CBC W/ AUTO DIFFERENTIAL - Abnormal; Notable for the following components:       Result Value    RBC 3.79 (*)     Hemoglobin 11.0 (*)     Hematocrit 35.2 (*)     Mean Corpuscular Hemoglobin Conc 31.3 (*)     All other components within normal limits   TSH - Abnormal; Notable for the following components:    TSH 0.194 (*)     All other components within normal limits   URINALYSIS, REFLEX TO URINE CULTURE - Abnormal; Notable for the following components:    Protein, UA 1+ (*)     All other components within normal limits    Narrative:     Preferred Collection Type->Urine, Clean Catch   ACETAMINOPHEN LEVEL -  Abnormal; Notable for the following components:    Acetaminophen (Tylenol), Serum <3.0 (*)     All other components within normal limits   COMPREHENSIVE METABOLIC PANEL   DRUG SCREEN PANEL, URINE EMERGENCY    Narrative:     Preferred Collection Type->Urine, Clean Catch   ALCOHOL,MEDICAL (ETHANOL)   URINALYSIS MICROSCOPIC    Narrative:     Preferred Collection Type->Urine, Clean Catch   T4, FREE          Imaging Results    None          Medical Decision Making:   History:   Old Medical Records: I decided to obtain old medical records.  Differential Diagnosis:   My differential diagnosis includes but is not limited to:  Acute psychosis, acute manic episode, acute toxicosis, medication noncompliance  Clinical Tests:   Lab Tests: Ordered  ED Management:  25 yo M with schizoaffective disorder versus schizophrenia presents to the ED for auditory hallucinations and psych evaluation.  Patient calm, cooperative.  His affect is flat.  PEC'd for grave disability.  Patient has a history of noncompliance with medication though he reports recent compliance.  Psych labs ordered.    No acute abnormalities on ED workup.  Patient is medically cleared for transfer to psychiatric facility for further treatment and management.   I have reviewed the patient's records and discussed this case with my supervising physician.                        Clinical Impression:       ICD-10-CM ICD-9-CM   1. Auditory hallucinations R44.0 780.1   2. Schizoaffective disorder, unspecified type F25.9 295.70         Disposition:   Disposition: Transferred  Condition: Stable                        Caryn Pham PA-C  09/12/19 3042

## 2019-09-12 NOTE — ED NOTES
Patient placed in blue paper scrubs and yellow socks. ED tech took pt to RR to obtain urine sample.

## 2019-09-12 NOTE — ED NOTES
Pt resting in bed, no acute distress noted. Bed in low and locked position. Room secured per [facility] protocol. Pt's belongings secured and pt in blue paper scrubs and yellow socks. Pt being directly monitored by sitter at this time. Will continue to monitor.

## 2019-09-12 NOTE — ED NOTES
Patient discharged to [facility] in [facility].  Mood calm and behavior is appropriate.  Patients transported per [facility].  Patients belongings sent with Barbara's to [facility].  Report was called to [facility].  Discharge packet with original [facility] sent with patient.  No complaints upon discharge.

## 2019-09-12 NOTE — ED NOTES
Patient Belongings  Brown pants  Gray shirt    Blue cell phone  Black wallet ($35 cash and some cards)   Red chain with keys

## 2019-09-12 NOTE — ED NOTES
Patient identifiers verified and correct for Price Godoy.    LOC: The patient is awake, alert and aware of environment with an appropriate affect, the patient is oriented x3 and speaking appropriately. Resting comfortably and in no acute distress, patient is clean and well groomed, patient's clothing is properly fastened.  SKIN: The skin is warm and dry, color consistent with ethnicity, patient has normal skin turgor and moist mucus membranes, skin intact, no breakdown or bruising noted.  MUSCULOSKELETAL: Patient moving all extremities spontaneously.  RESPIRATORY: Airway is open and patent, respirations are spontaneous.  CARDIAC: Patient has a normal rate, no periphreal edema noted, capillary refill < 3 seconds.  ABDOMEN: Soft and non tender to palpation.  PSYCH: Patient has a hx of schizo-affective disorder. He states he has been hearing childrens voices talking to him for the past week that are not there. He denies the voices saying anything hurtful or harmful.

## 2019-09-12 NOTE — ED NOTES
Patient arrived to the [facility] per wheel chair.  Accompanied by Juan M WARREN and security.  Mood is calm and cooperative.  Patient reports that he is still hearing the voices.  Patient reports medication compliance prior to coming to the ED.  Patient told writer that the voices never go away completely.  Patient denies suicidal or homicidal ideations.  Patient informed that he was placed at [facility] in [place].  He verbalized understanding.  Given the phone to call his father to notify him of placement.  No answer at this time.  Patient has one bag of belongings that was itemized and locked in the closet.  In paper scrubs.  Searched with no contraband found.

## 2019-09-23 PROBLEM — F29 PSYCHOSIS: Status: RESOLVED | Noted: 2019-09-12 | Resolved: 2019-09-23

## 2019-09-23 PROBLEM — R74.8 ELEVATED CPK: Status: RESOLVED | Noted: 2019-07-26 | Resolved: 2019-09-23

## 2019-09-25 ENCOUNTER — PATIENT OUTREACH (OUTPATIENT)
Dept: ADMINISTRATIVE | Facility: CLINIC | Age: 24
End: 2019-09-25

## 2019-09-25 NOTE — PATIENT INSTRUCTIONS
Psychosis  Psychosis is a symptom of certain mental health problems. It involves perceiving reality differently from those around you. The difference between reality and what you think become blurred in your mind. Other mental health conditions, physical diseases, traumatic experiences, or drugs and toxins may bring on psychotic symptoms or behavior.  There are different kinds of psychosis:  · Drug-induced (due to alcohol, methamphetamine, cocaine, LSD, PCP and others)  · Bipolar disorder  · Depression  · Schizophrenia  · Dementia  Symptoms  The symptoms of psychosis may not all be the same for each person. However, they usually involve:  · Hallucinations. Seeing, hearing, feeling, or even tasting or smelling things that are not there  · Delusions. Believing something that is not true, or false beliefs that are not part of a person's Zoroastrian or cultural background.  There may also be disturbances in thinking, speech and behavior, which can include:  · Hearing voices that others do not hear  · Seeing things that others do not see  · Racing thoughts  · Lack of energy  · Feeling very fearful  · Disorganized speech  · Intentional or unintentional bodily harm to others  · Paranoia  · Trouble thinking or concentrating clearly  · Depression, feeling suicidal  · Insomnia  · Withdrawal from those around you  Treatment for psychosis depends on the cause. Medicine, with or without psychotherapy, is often used.  Home care  · Find a healthcare provider and therapist who meet your needs.  Seek help when you feel like your symptoms are returning  · Be certain to tell each of your healthcare providers about all of the prescription drugs, over-the-counter medicines, and supplements you take. Certain supplements interact with medicines and can result in dangerous side effects. Ask your pharmacist when you have questions about drug interactions.  · Be sure to take your medicine as directed even if you think you don't need  it.  · Follow-up with lab tests as advised by your healthcare provider.  · Talk with your family about your feelings and thoughts. Ask them to help you recognize any behavior changes so you can get help and, if needed, medicines can be adjusted.  Follow-up care  Follow up with your counselor, therapist or psychiatrist as advised.  Call 911  Call 911 if you:  · Have suicidal thoughts, a suicide plan, and the means to carry out the plan  · Have troubled breathing  · Are very confused  · Are very drowsy or have trouble awakening  · Faint or lose consciousness  · Have a rapid heart rate, very low heart rate, or a new irregular heart rate  · Have a seizure  When to seek medical advice  Call your healthcare provider right away if any of these occur:  · Gradual or rapid return of psychotic symptoms  · Feeling like you want to harm yourself or another  · Feeling extremely depressed  · Feeling very anxious, agitated, or angry  · Feeling out of control or being controlled by others  · Unable to care for yourself  · Seeing things or hearing voices that you know aren't real  Date Last Reviewed: 9/29/2015  © 2250-2111 [employer]. [address] [place] [address]. All rights reserved. This information is not intended as a substitute for professional medical care. Always follow your healthcare professional's instructions.

## 2020-07-28 ENCOUNTER — HOSPITAL ENCOUNTER (EMERGENCY)
Facility: HOSPITAL | Age: 25
Discharge: HOME | End: 2020-07-28
Attending: EMERGENCY MEDICINE
Payer: MEDICAID

## 2020-07-28 VITALS
BODY MASS INDEX: 39.34 KG/M2 | HEART RATE: 88 BPM | WEIGHT: 281 LBS | TEMPERATURE: 98 F | DIASTOLIC BLOOD PRESSURE: 86 MMHG | HEIGHT: 71 IN | SYSTOLIC BLOOD PRESSURE: 137 MMHG | OXYGEN SATURATION: 98 % | RESPIRATION RATE: 16 BRPM

## 2020-07-28 DIAGNOSIS — K64.4 EXTERNAL HEMORRHOIDS: Primary | ICD-10-CM

## 2020-07-28 PROCEDURE — 99284 EMERGENCY DEPT VISIT MOD MDM: CPT | Mod: ,,, | Performed by: EMERGENCY MEDICINE

## 2020-07-28 PROCEDURE — 99284 PR EMERGENCY DEPT VISIT,LEVEL IV: ICD-10-PCS | Mod: ,,, | Performed by: EMERGENCY MEDICINE

## 2020-07-28 PROCEDURE — 99283 EMERGENCY DEPT VISIT LOW MDM: CPT

## 2020-07-28 PROCEDURE — 25000003 PHARM REV CODE 250: Performed by: EMERGENCY MEDICINE

## 2020-07-28 RX ORDER — HYDROCORTISONE 1 %
CREAM (GRAM) TOPICAL
Qty: 30 G | Refills: 0 | Status: ON HOLD | OUTPATIENT
Start: 2020-07-28 | End: 2020-12-27 | Stop reason: HOSPADM

## 2020-07-28 RX ORDER — LIDOCAINE HYDROCHLORIDE 20 MG/ML
5 SOLUTION OROPHARYNGEAL
Status: COMPLETED | OUTPATIENT
Start: 2020-07-28 | End: 2020-07-28

## 2020-07-28 RX ORDER — BROMPHENIRAMINE MALEATE, DEXTROMETHORPHAN HBR, PHENYLEPHRINE HCL, DIPHENHYDRAMINE HCL, PHENYLEPHRINE HCL 0.52G
0.52 KIT ORAL DAILY
Status: ON HOLD | COMMUNITY
Start: 2020-07-28 | End: 2021-03-26 | Stop reason: HOSPADM

## 2020-07-28 RX ADMIN — LIDOCAINE HYDROCHLORIDE 5 ML: 20 SOLUTION ORAL; TOPICAL at 01:07

## 2020-07-28 NOTE — ED NOTES
Patient identifiers verified and correct for Mr Godoy  C/C: possible hemroids SEE NN  APPEARANCE: awake and alert in NAD.  SKIN: warm, dry and intact. No breakdown or bruising.  MUSCULOSKELETAL: Patient moving all extremities spontaneously, no obvious swelling or deformities noted. Ambulates independently.  RESPIRATORY: Denies shortness of breath.Respirations unlabored.   CARDIAC: Denies CP, 2+ distal pulses; no peripheral edema  ABDOMEN: S/ND/NT, Denies nausea, denies constipation, denies abd pain   : voids spontaneously, denies difficulty, denies bleeding from hemroids  Neurologic: AAO x 4; follows commands equal strength in all extremities; denies numbness/tingling. Denies dizziness Denies weakness

## 2020-07-28 NOTE — ED NOTES
Patient discharge has been delayed due to waiting for MD to apply med to hemorrhoids prior to dc. Med  Had to be obtained from in pt pharmacy.

## 2020-07-28 NOTE — ED TRIAGE NOTES
Patient states known hemorid x 6 months -1 year, no OTC meds. Saw physician 3 months ago for same.

## 2020-07-28 NOTE — ED PROVIDER NOTES
Encounter Date: 7/28/2020    SCRIBE #1 NOTE: I, Aysha Reese, am scribing for, and in the presence of,  Robert Day MD. I have scribed the following portions of the note - Other sections scribed: HPI ROS PE.     STAFF ATTENDING PHYSICIAN NOTE:  I provided and agree with the documentation provided by CATARINA on Price Godoy.  ____________________  León BRIGIDO Day MD, [facility]  Emergency Medicine Staff  12:09 PM 7/28/2020      History     Chief Complaint   Patient presents with    Hemorrhoids     Pt with hemorrhoids      Time patient was seen by the provider: 11:40 AM      The patient is a 25 y.o. male with co-morbidities including: anxiety, depression, who presents to the ED with a complaint of rectal pain. The patient reports history of hemorrhoids. This problem is recurrent. This has been going on for 6 months to a year. The patient is followed by his PCP. He was prescribed suppositories but he never filled the prescription. States he has been having normal brown stools with his bowel movements. No blood in the stool. Notes it is painful when wiping after a bowel movement. The bowel movement he had today was extremely painful to pass. Rates the pain 10/10 in severity. This prompted him to come into the ED today. The patient works in [employer], he is lifting a lot of heavy objects often. When lifting he admits to holding his breath. Denies constipation, fever, chills, abdominal pain, chest pain or shortness of breath. Compliant with medications. He is on Invega IM once a month.    The history is provided by the patient and medical records.     Review of patient's allergies indicates:   Allergen Reactions    Amoxicillin      Past Medical History:   Diagnosis Date    Anxiety     Depression      Past Surgical History:   Procedure Laterality Date    APPENDECTOMY       Family History   Problem Relation Age of Onset    Mental illness Brother     Schizophrenia Maternal Uncle     Diabetes Maternal  Grandmother     Diabetes Paternal Grandmother      Social History     Tobacco Use    Smoking status: Current Every Day Smoker     Packs/day: 0.50     Types: Cigarettes    Smokeless tobacco: Never Used   Substance Use Topics    Alcohol use: Yes     Alcohol/week: 1.0 standard drinks     Types: 1 Cans of beer per week     Comment: rarely    Drug use: Not Currently     Types: Marijuana     Comment: day 3 detox     Review of Systems  CONST: No fever, chills, weight change, or fatigue.  HEENT: No headache, blurry vision/change in vision, sore throat, ear pain, eye pain, otorrhea, rhinorrhea, tooth pain, swelling, or voice changes.  NECK: No pain, masses, trauma, or redness.  HEART: No pain, palpitations, or diaphoresis.  LUNG: No SOB, cough, orthopnea, ALMONTE or other complaints.  ABDOMEN: No pain, nausea, vomiting, diarrhea, constipation, or flank pain.  : No discharge, dysuria, lesions, rashes, masses, sores. +Rectal pain  EXTREMITIES: FROM with No swelling, redness, injuries/trauma, lesions, sores, weakness, numbness, or tingling.  NEURO: No dizziness, weakness, fatigue, tremors, headache, change in vision or disturbances of balance or coordination.  SKIN: No lesions, rashes, trauma or other complaints.    Physical Exam     Initial Vitals [07/28/20 1125]   BP Pulse Resp Temp SpO2   137/86 88 16 98.2 °F (36.8 °C) 98 %      MAP       --         Physical Exam  GENERAL: Calm; Cooperative; Well-appearing and Non-Toxic; Well-Nourished; NAD.  HEENT: AT/NC; anicteric; speaking full sentences with no slurring of speech or drooling/inability to tolerate oral secretions.  NECK: Supple, FROM.  HEART: Regular rate and rhythm, no M/G/T.  LUNGS: No Tachypnea, No Retractions, and CTA B/L with no W/R/R.  ABDOMEN: +BS, Soft, ND, NTTP.   : Tender non thrombosed, non actively bleeding hemorrhoid at 12 o'clock and 9 o'clock  EXTREMITIES: FROM.   SKIN: Warm, Dry, No Skin Tears or Rashes.  VASCULAR: 2+ pulses Prox/Dist & Symmetrical  with No delay.  NEUROLOGIC: AAOx3; answering questions appropriate with no focal deficit.    ED Course   Procedures  Labs Reviewed - No data to display       Imaging Results    None          Medical Decision Making:   History:   Old Medical Records: I decided to obtain old medical records.  Initial Assessment:   Afebrile, atraumatic, hemodynamically stable and well appearing male presents with signs symptoms of non thrombosed, not actively bleeding external hemorrhoids.  Discussed Sitz baths, treatment of constipation and lifting heavy objects given the nature of his job, which she understood.  Will provide with topical lidocaine and recommended Metamucil + loss of water intake if hard stools.  No need of emergent intervention at this time.  ____________________  León Day MD, [facility]  Emergency Medicine Staff  12:08 PM 7/28/2020              Scribe Attestation:   Scribe #1: I performed the above scribed service and the documentation accurately describes the services I performed. I attest to the accuracy of the note.                          Clinical Impression:       ICD-10-CM ICD-9-CM   1. External hemorrhoids  K64.4 455.3         Disposition:   Disposition: Discharged  Condition: Stable     ED Disposition Condition    Discharge Stable        ED Prescriptions     Medication Sig Dispense Start Date End Date Auth. Provider    psyllium 0.52 gram capsule Take 1 capsule (0.52 g total) by mouth once daily.  7/28/2020  Robert Day MD    hydrocortisone 1 % cream Apply to affected area 2 times daily 30 g 7/28/2020  Robert Day MD        Follow-up Information    None                                    Robert Day MD  07/28/20 [id number]

## 2020-12-17 ENCOUNTER — HOSPITAL ENCOUNTER (EMERGENCY)
Facility: HOSPITAL | Age: 25
Discharge: PSYCHIATRIC OR SUBSTANCE USE FACILITY | End: 2020-12-18
Attending: EMERGENCY MEDICINE
Payer: MEDICAID

## 2020-12-17 VITALS
HEART RATE: 65 BPM | WEIGHT: 280 LBS | TEMPERATURE: 99 F | BODY MASS INDEX: 39.2 KG/M2 | RESPIRATION RATE: 18 BRPM | SYSTOLIC BLOOD PRESSURE: 116 MMHG | DIASTOLIC BLOOD PRESSURE: 64 MMHG | OXYGEN SATURATION: 99 % | HEIGHT: 71 IN

## 2020-12-17 DIAGNOSIS — F23 ACUTE PSYCHOSIS: ICD-10-CM

## 2020-12-17 DIAGNOSIS — R45.851 SUICIDAL IDEATIONS: Primary | ICD-10-CM

## 2020-12-17 DIAGNOSIS — R45.850 HOMICIDAL IDEATIONS: ICD-10-CM

## 2020-12-17 LAB
ALBUMIN SERPL BCP-MCNC: 4.9 G/DL (ref 3.5–5.2)
ALP SERPL-CCNC: 80 U/L (ref 55–135)
ALT SERPL W/O P-5'-P-CCNC: 15 U/L (ref 10–44)
AMPHET+METHAMPHET UR QL: NEGATIVE
ANION GAP SERPL CALC-SCNC: 13 MMOL/L (ref 8–16)
APAP SERPL-MCNC: <3 UG/ML (ref 10–20)
AST SERPL-CCNC: 19 U/L (ref 10–40)
BARBITURATES UR QL SCN>200 NG/ML: NEGATIVE
BASOPHILS # BLD AUTO: 0.03 K/UL (ref 0–0.2)
BASOPHILS NFR BLD: 0.4 % (ref 0–1.9)
BENZODIAZ UR QL SCN>200 NG/ML: NEGATIVE
BILIRUB SERPL-MCNC: 0.6 MG/DL (ref 0.1–1)
BILIRUB UR QL STRIP: NEGATIVE
BUN SERPL-MCNC: 15 MG/DL (ref 6–20)
BZE UR QL SCN: NEGATIVE
CALCIUM SERPL-MCNC: 9.5 MG/DL (ref 8.7–10.5)
CANNABINOIDS UR QL SCN: NORMAL
CHLORIDE SERPL-SCNC: 104 MMOL/L (ref 95–110)
CLARITY UR REFRACT.AUTO: CLEAR
CO2 SERPL-SCNC: 22 MMOL/L (ref 23–29)
COLOR UR AUTO: YELLOW
CREAT SERPL-MCNC: 0.9 MG/DL (ref 0.5–1.4)
CREAT UR-MCNC: 232 MG/DL (ref 23–375)
CTP QC/QA: YES
DIFFERENTIAL METHOD: ABNORMAL
EOSINOPHIL # BLD AUTO: 0.1 K/UL (ref 0–0.5)
EOSINOPHIL NFR BLD: 1.2 % (ref 0–8)
ERYTHROCYTE [DISTWIDTH] IN BLOOD BY AUTOMATED COUNT: 12.5 % (ref 11.5–14.5)
EST. GFR  (AFRICAN AMERICAN): >60 ML/MIN/1.73 M^2
EST. GFR  (NON AFRICAN AMERICAN): >60 ML/MIN/1.73 M^2
ETHANOL SERPL-MCNC: <10 MG/DL
GLUCOSE SERPL-MCNC: 92 MG/DL (ref 70–110)
GLUCOSE UR QL STRIP: NEGATIVE
HCT VFR BLD AUTO: 41.3 % (ref 40–54)
HGB BLD-MCNC: 13.5 G/DL (ref 14–18)
HGB UR QL STRIP: NEGATIVE
IMM GRANULOCYTES # BLD AUTO: 0.01 K/UL (ref 0–0.04)
IMM GRANULOCYTES NFR BLD AUTO: 0.1 % (ref 0–0.5)
KETONES UR QL STRIP: ABNORMAL
LEUKOCYTE ESTERASE UR QL STRIP: NEGATIVE
LYMPHOCYTES # BLD AUTO: 3.3 K/UL (ref 1–4.8)
LYMPHOCYTES NFR BLD: 39.8 % (ref 18–48)
MCH RBC QN AUTO: 28.8 PG (ref 27–31)
MCHC RBC AUTO-ENTMCNC: 32.7 G/DL (ref 32–36)
MCV RBC AUTO: 88 FL (ref 82–98)
METHADONE UR QL SCN>300 NG/ML: NEGATIVE
MONOCYTES # BLD AUTO: 0.8 K/UL (ref 0.3–1)
MONOCYTES NFR BLD: 9.3 % (ref 4–15)
NEUTROPHILS # BLD AUTO: 4.1 K/UL (ref 1.8–7.7)
NEUTROPHILS NFR BLD: 49.2 % (ref 38–73)
NITRITE UR QL STRIP: NEGATIVE
NRBC BLD-RTO: 0 /100 WBC
OPIATES UR QL SCN: NEGATIVE
PCP UR QL SCN>25 NG/ML: NEGATIVE
PH UR STRIP: 6 [PH] (ref 5–8)
PLATELET # BLD AUTO: 340 K/UL (ref 150–350)
PMV BLD AUTO: 9.4 FL (ref 9.2–12.9)
POTASSIUM SERPL-SCNC: 3.5 MMOL/L (ref 3.5–5.1)
PROT SERPL-MCNC: 8.3 G/DL (ref 6–8.4)
PROT UR QL STRIP: NEGATIVE
RBC # BLD AUTO: 4.68 M/UL (ref 4.6–6.2)
SARS-COV-2 RDRP RESP QL NAA+PROBE: NEGATIVE
SODIUM SERPL-SCNC: 139 MMOL/L (ref 136–145)
SP GR UR STRIP: 1.03 (ref 1–1.03)
TOXICOLOGY INFORMATION: NORMAL
TSH SERPL DL<=0.005 MIU/L-ACNC: 0.54 UIU/ML (ref 0.4–4)
URN SPEC COLLECT METH UR: ABNORMAL
WBC # BLD AUTO: 8.37 K/UL (ref 3.9–12.7)

## 2020-12-17 PROCEDURE — 25000003 PHARM REV CODE 250: Performed by: EMERGENCY MEDICINE

## 2020-12-17 PROCEDURE — 99285 EMERGENCY DEPT VISIT HI MDM: CPT | Mod: 25

## 2020-12-17 PROCEDURE — 80329 ANALGESICS NON-OPIOID 1 OR 2: CPT

## 2020-12-17 PROCEDURE — 85025 COMPLETE CBC W/AUTO DIFF WBC: CPT

## 2020-12-17 PROCEDURE — 80307 DRUG TEST PRSMV CHEM ANLYZR: CPT

## 2020-12-17 PROCEDURE — 99284 PR EMERGENCY DEPT VISIT,LEVEL IV: ICD-10-PCS | Mod: CS,,, | Performed by: EMERGENCY MEDICINE

## 2020-12-17 PROCEDURE — 99284 EMERGENCY DEPT VISIT MOD MDM: CPT | Mod: CS,,, | Performed by: EMERGENCY MEDICINE

## 2020-12-17 PROCEDURE — 80320 DRUG SCREEN QUANTALCOHOLS: CPT

## 2020-12-17 PROCEDURE — 84443 ASSAY THYROID STIM HORMONE: CPT

## 2020-12-17 PROCEDURE — 80053 COMPREHEN METABOLIC PANEL: CPT

## 2020-12-17 PROCEDURE — U0002 COVID-19 LAB TEST NON-CDC: HCPCS | Performed by: EMERGENCY MEDICINE

## 2020-12-17 PROCEDURE — 81003 URINALYSIS AUTO W/O SCOPE: CPT | Mod: 59

## 2020-12-17 RX ORDER — PALIPERIDONE 6 MG/1
6 TABLET, EXTENDED RELEASE ORAL
Status: COMPLETED | OUTPATIENT
Start: 2020-12-17 | End: 2020-12-17

## 2020-12-17 RX ORDER — LORAZEPAM 1 MG/1
2 TABLET ORAL
Status: COMPLETED | OUTPATIENT
Start: 2020-12-17 | End: 2020-12-17

## 2020-12-17 RX ADMIN — PALIPERIDONE 6 MG: 6 TABLET, EXTENDED RELEASE ORAL at 10:12

## 2020-12-17 RX ADMIN — LORAZEPAM 2 MG: 1 TABLET ORAL at 05:12

## 2020-12-17 NOTE — ED NOTES
"25 y.o. male to ED with c.o. suicidal and homicidal ideation x "a few days". Patient has no specific plan but states "if I had a gun I would shoot myself". Patient states he does not have access to guns. Patient reports he has been out of his psych medication for 2-3 months. Patient endorses auditory hallucinations that tell him to kill himself and to "obey". Patient laughing during conversation and states he is hearing voices currently. Patient denies all medical complaints at this time. Patient denies n/v/d, denies fever/chills, denies chest pain/ cough/ shortness of breath. Patient awake, alert, and oriented x 4. No apparent distress noted. VS currently stable. Patient assisted onto stretcher and given scrubs to wear. All items/ furniture removed from room. Bed placed in low locked position, side rails up x 2, sitter at bedside, orientation to room and explanation of wait provided to patient, awaiting MD evaluation and orders, will continue to monitor.    "

## 2020-12-17 NOTE — ED PROVIDER NOTES
"Encounter Date: 12/17/2020       History     Chief Complaint   Patient presents with    Suicidal     Out of Invega for 2-3 months. Decided to stop taking it. Reports auditory and visual hallucinations. Admits to SI/HI. States "I need to go to a mental hospital"    Homicidal     25-year-old male history of depression has been noncompliant his medications for over 3 months presents with increased auditory and visual hallucinations.  I have been going on for about a month.  He states they just tell an to obey.  He states that he is to obey God.  He states he is having intermittent thoughts of wanting to kill himself.  Denies any plan but states that if he had access to a gun he might shoot himself.  He however, does not have access to guns.  Denies any fevers or chills or any other complaints.        Review of patient's allergies indicates:   Allergen Reactions    Amoxicillin      Past Medical History:   Diagnosis Date    Anxiety     Depression      Past Surgical History:   Procedure Laterality Date    APPENDECTOMY       Family History   Problem Relation Age of Onset    Mental illness Brother     Schizophrenia Maternal Uncle     Diabetes Maternal Grandmother     Diabetes Paternal Grandmother      Social History     Tobacco Use    Smoking status: Current Every Day Smoker     Packs/day: 0.50     Types: Cigarettes    Smokeless tobacco: Never Used   Substance Use Topics    Alcohol use: Yes     Alcohol/week: 1.0 standard drinks     Types: 1 Cans of beer per week     Comment: rarely    Drug use: Not Currently     Types: Marijuana     Comment: day 3 detox     Review of Systems   Constitutional: Negative for activity change, appetite change, chills and fever.   HENT: Negative for congestion and sinus pressure.    Eyes: Negative for discharge.   Respiratory: Negative for cough, choking, chest tightness and shortness of breath.    Cardiovascular: Negative for chest pain.   Gastrointestinal: Negative for abdominal " pain, diarrhea and vomiting.   Genitourinary: Negative for difficulty urinating and dysuria.   Musculoskeletal: Negative for arthralgias.   Skin: Negative for color change.   Neurological: Negative for dizziness and headaches.   Hematological: Does not bruise/bleed easily.   Psychiatric/Behavioral: Positive for hallucinations and suicidal ideas. Negative for agitation and behavioral problems.       Physical Exam     Initial Vitals [12/17/20 1616]   BP Pulse Resp Temp SpO2   138/83 69 16 98.9 °F (37.2 °C) 98 %      MAP       --         Physical Exam    Nursing note and vitals reviewed.  Constitutional: He appears well-developed and well-nourished.  Non-toxic appearance. He does not have a sickly appearance. No distress.   HENT:   Head: Normocephalic and atraumatic.   Nose: Nose normal.   Mouth/Throat: Oropharynx is clear and moist.   Eyes: Conjunctivae, EOM and lids are normal. Pupils are equal, round, and reactive to light. Right eye exhibits no nystagmus. Left eye exhibits no nystagmus.   Neck: Trachea normal, normal range of motion and phonation normal. Neck supple. No stridor present.   Cardiovascular: Normal rate, regular rhythm, normal heart sounds and normal pulses. Exam reveals no gallop and no friction rub.    No murmur heard.  Pulmonary/Chest: Breath sounds normal. No respiratory distress. He has no wheezes. He has no rhonchi. He has no rales.   Abdominal: Soft. Normal appearance and bowel sounds are normal. He exhibits no distension. There is no abdominal tenderness. There is no rigidity, no rebound, no guarding, no tenderness at McBurney's point and negative Estrada's sign.   Musculoskeletal: Normal range of motion. No tenderness or edema.   Neurological: He is alert and oriented to person, place, and time. He has normal strength and normal reflexes. He displays normal reflexes. No cranial nerve deficit or sensory deficit. He displays a negative Romberg sign. GCS eye subscore is 4. GCS verbal subscore is  5. GCS motor subscore is 6.   Skin: Skin is warm, dry and intact. Capillary refill takes less than 2 seconds. No rash noted. No pallor.   Psychiatric: He has a normal mood and affect. His speech is normal and behavior is normal. Judgment normal. He is not actively hallucinating. Cognition and memory are normal. He expresses suicidal (Not currently) ideation. He expresses no homicidal ideation. He expresses no suicidal plans and no homicidal plans.   Patient states he few is discharged home this evening he would go home and write a letter to God. He is attentive.         ED Course   Procedures  Labs Reviewed   CBC W/ AUTO DIFFERENTIAL - Abnormal; Notable for the following components:       Result Value    Hemoglobin 13.5 (*)     All other components within normal limits   COMPREHENSIVE METABOLIC PANEL - Abnormal; Notable for the following components:    CO2 22 (*)     All other components within normal limits   URINALYSIS, REFLEX TO URINE CULTURE - Abnormal; Notable for the following components:    Ketones, UA 1+ (*)     All other components within normal limits    Narrative:     Specimen Source->Urine   ACETAMINOPHEN LEVEL - Abnormal; Notable for the following components:    Acetaminophen (Tylenol), Serum <3.0 (*)     All other components within normal limits   TSH   DRUG SCREEN PANEL, URINE EMERGENCY    Narrative:     Specimen Source->Urine   ALCOHOL,MEDICAL (ETHANOL)   SARS-COV-2 RDRP GENE    Narrative:     This test utilizes isothermal nucleic acid amplification   technology to detect the SARS-CoV-2 RdRp nucleic acid segment.   The analytical sensitivity (limit of detection) is 125 genome   equivalents/mL.   A POSITIVE result implies infection with the SARS-CoV-2 virus;   the patient is presumed to be contagious.     A NEGATIVE result means that SARS-CoV-2 nucleic acids are not   present above the limit of detection. A NEGATIVE result should be   treated as presumptive. It does not rule out the possibility of    COVID-19 and should not be the sole basis for treatment decisions.   If COVID-19 is strongly suspected based on clinical and exposure   history, re-testing using an alternate molecular assay should be   considered.   This test is only for use under the Food and Drug   Administration s Emergency Use Authorization (EUA).   Commercial kits are provided by [employer].   Performance characteristics of the EUA have been independently   verified by [facility] Department of   Pathology and Laboratory Medicine.   _________________________________________________________________   The authorized Fact Sheet for Healthcare Providers and the authorized Fact   Sheet for Patients of the ID NOW COVID-19 are available on the FDA   website:     https://www.fda.gov/media/978483/download  https://www.fda.gov/media/504757/download                Imaging Results    None          Medical Decision Making:   Initial Assessment:   25-year-old male history of depression presents with fleeting suicidal ideations as well as increased visual and auditory hallucinations.  Based on my examination the patient appears to be well within his faculties.  He is nola for safety.  Denies any suicidal ideations at this time and really denies any plan.  I do not feel he necessarily needs to be placed on a PEC but I do feel a psychiatric consultation would be beneficial.  Will discuss with psych.  Will also get the necessary blood work as well as labs that are needed for inpatient if they feel that this is what is recommended for the patient.  Patient states he currently does not have a patient doctor relationship with a psychiatrist at this time.  Clinical Tests:   Lab Tests: Reviewed and Ordered                   ED Course as of Dec 17 1942   Thu Dec 17, 2020   1721 SARS-CoV-2 RNA, Amplification, Qual: Negative [SM]   1722 Patient does appear to be getting increasingly agitated.  Is given Ativan orally.    [SM]   1727  SARS-CoV-2 RNA, Amplification, Qual: Negative [SM]   1736 I spoke with Psychiatry who will do a tele psychiatry consultation with the patient.  This was with the resident, Dr. Richards.    [SM]   1747 No significant findings on any the blood or urine.  The patient is medically stable for psychiatric evaluation.    [SM]   1818 Marijuana (THC) Metabolite: Presumptive Positive [SM]   1827 PendingPsychiatric evaluation.    [SM]   1938 Psychiatry recommended placing the patient on a pec.  PEC order placed.  He is medically stable for psychiatric evaluation as an inpatient center. They recommend invega 6mg PO.    This is the extent to the patients complaints today here in the emergency department.    [SM]      ED Course User Index  [SM] Rashel Saldana DO            Clinical Impression:     ICD-10-CM ICD-9-CM   1. Suicidal ideations  R45.851 V62.84   2. Homicidal ideations  R45.850 V62.85   3. Acute psychosis  F23 298.9                          ED Disposition Condition    Transfer to Psych Facility         ED Prescriptions     None        Follow-up Information    None                                      Rashel Saldana DO  12/17/20 1942

## 2020-12-18 PROBLEM — F29 PSYCHOSIS: Status: ACTIVE | Noted: 2020-12-18

## 2020-12-18 RX ORDER — PALIPERIDONE 6 MG/1
6 TABLET, EXTENDED RELEASE ORAL NIGHTLY
Status: DISCONTINUED | OUTPATIENT
Start: 2020-12-18 | End: 2020-12-18

## 2020-12-18 NOTE — ED NOTES
Currently engaging w/ psychiatrist via telepsych. He is maintained on DVC. Stretcher is maintained in lowest locked position, one side rail in upright position w/ stretcher flushed against wall to other side of bed.

## 2020-12-18 NOTE — ED NOTES
Called to give report, message taken by Flynn & will have accepting nurse(Rylie) to call for report.

## 2020-12-18 NOTE — ED NOTES
The patient departed the [facility] w/ escort of hospital security. All belongings given to [facility] staff. He left w/o difficulty or complaints.

## 2020-12-18 NOTE — ED NOTES
"The patient arrived to the [facility] via w/c escorted by the sitter & hospital security. He is attired in blue paper hospital scrubs w/ fair grooming & hygiene. He was checked for contraband, none found. His affect is blunted, mood is guarded. He remains cooperative. He presents on a PEC written by Dr. TAVARES Saldana on 12/17/2020 @ 1910. He denies S/HI, VH. He endorses  stating, " I'm having a conversation w/ two of my friends." He was again informed of the plan of care. He is maintained on DVC for safety.  "

## 2020-12-18 NOTE — ED NOTES
Patient aroused by call of name a few times for medication administration. He accepted medication w/ provided rationale for use. He resumed resting position in bed. He was offered a snack but declined. Maintained on DVC.

## 2020-12-18 NOTE — ED NOTES
"The patient has completed his telpsych interview. He called nurse to his bedside & endorsed sexual desires for his friends' seven y/o son "Dramon." He denies any sexual abuse perpetrated upon his person. A few minutes later he states, " I just had a thought. I want to kill Dramon." He denies a formulated plan for HI. He resumed a resting position on the stretcher. DVC maintained.  "

## 2020-12-18 NOTE — SUBJECTIVE & OBJECTIVE
Patient History           Medical as of 12/17/2020     Past Medical History     Diagnosis Date Comments Source    Anxiety -- -- Provider    Depression -- -- Provider                  Surgical as of 12/17/2020     Past Surgical History     Procedure Laterality Date Comments Source    APPENDECTOMY -- -- -- Provider                  Family as of 12/17/2020     Problem Relation Name Age of Onset Comments Source    Mental illness Brother -- -- -- Provider    Schizophrenia Maternal Uncle -- -- -- Provider    Diabetes Maternal Grandmother -- -- -- Provider    Diabetes Paternal Grandmother -- -- -- Provider            Tobacco Use as of 12/17/2020     Smoking Status Smoking Start Date Smoking Quit Date Packs/Day Years Used    Current Every Day Smoker -- -- 0.50 --    Types Comments Smokeless Tobacco Status Smokeless Tobacco Quit Date Source     Cigarettes -- Never Used -- Provider            Alcohol Use as of 12/17/2020     Alcohol Use Drinks/Week Alcohol/Week Comments Source    Yes 1 Cans of beer 1.0 standard drinks rarely Provider    Frequency Typical Drinks Binge Drinking        -- -- --              Drug Use as of 12/17/2020     Drug Use Types Frequency Comments Source    Not Currently  Marijuana -- day 3 detox Provider            Sexual Activity as of 12/17/2020     Sexually Active Birth Control Partners Comments Source    Never -- -- -- Provider            Activities of Daily Living as of 12/17/2020    None           Social Documentation as of 12/17/2020    None           Occupational as of 12/17/2020    None           Socioeconomic as of 12/17/2020     Marital Status Spouse Name Number of Children Years Education Education Level Preferred Language Ethnicity Race Source    Single -- -- -- -- English // Black or  Provider    Financial Resource Strain Food Insecurity: Worry Food Insecurity: Inability Transportation Needs: Medical Transportation Needs: Non-medical    -- -- -- -- --             Pertinent History     Question Response Comments    Lives with -- --    Place in Birth Order -- --    Lives in -- --    Number of Siblings -- --    Raised by -- --    Legal Involvement -- --    Childhood Trauma -- --    Criminal History of -- --    Financial Status -- --    Highest Level of Education -- --    Does patient have access to a firearm? -- --     Service -- --    Primary Leisure Activity -- --    Spirituality -- --        Past Medical History:   Diagnosis Date    Anxiety     Depression      Past Surgical History:   Procedure Laterality Date    APPENDECTOMY       Family History     Problem Relation (Age of Onset)    Diabetes Maternal Grandmother, Paternal Grandmother    Mental illness Brother    Schizophrenia Maternal Uncle        Tobacco Use    Smoking status: Current Every Day Smoker     Packs/day: 0.50     Types: Cigarettes    Smokeless tobacco: Never Used   Substance and Sexual Activity    Alcohol use: Yes     Alcohol/week: 1.0 standard drinks     Types: 1 Cans of beer per week     Comment: rarely    Drug use: Not Currently     Types: Marijuana     Comment: day 3 detox    Sexual activity: Never     Review of patient's allergies indicates:   Allergen Reactions    Amoxicillin        No current facility-administered medications on file prior to encounter.      Current Outpatient Medications on File Prior to Encounter   Medication Sig    benztropine (COGENTIN) 2 MG Tab Take 1 tablet (2 mg total) by mouth 2 (two) times daily.    cloZAPine (CLOZARIL) 100 MG Tab Take 1 tablet (100 mg total) by mouth every evening.    diphenhydrAMINE (BENADRYL) 25 mg capsule Take 1 each (25 mg total) by mouth every 6 (six) hours as needed (face twisting).    famotidine (PEPCID) 20 MG tablet Take 1 tablet (20 mg total) by mouth once daily.    FLUoxetine 20 MG capsule Take 1 capsule (20 mg total) by mouth once daily.    hydrocortisone 1 % cream Apply to affected area 2 times daily     "paliperidone palmitate (INVEGA TRINZA IM) Inject into the muscle.    psyllium 0.52 gram capsule Take 1 capsule (0.52 g total) by mouth once daily.    ziprasidone (GEODON) 20 MG Cap Take 4 capsules (80 mg total) by mouth 2 (two) times daily with meals.     Psychotherapeutics (From admission, onward)    Start     Stop Route Frequency Ordered    12/17/20 2015  paliperidone 24 hr tablet 6 mg      12/18 0814 Oral ED 1 Time 12/17/20 1941        Review of Systems  Strengths and Liabilities: Strength: Patient has positive support network., Liability: Patient has poor judgment    Objective:     Vital Signs (Most Recent):  Temp: 98.4 °F (36.9 °C) (12/17/20 1937)  Pulse: 92 (12/17/20 1937)  Resp: 18 (12/17/20 1937)  BP: (!) 114/56 (12/17/20 1937)  SpO2: 96 % (12/17/20 1937) Vital Signs (24h Range):  Temp:  [98.4 °F (36.9 °C)-98.9 °F (37.2 °C)] 98.4 °F (36.9 °C)  Pulse:  [69-92] 92  Resp:  [16-18] 18  SpO2:  [96 %-98 %] 96 %  BP: (114-138)/(56-83) 114/56     Height: 5' 11" (180.3 cm)  Weight: 127 kg (280 lb)  Body mass index is 39.05 kg/m².    No intake or output data in the 24 hours ending 12/17/20 2006    Physical Exam  Psychiatric:      Comments: Mental Status Exam:  Appearance: unremarkable, age appropriate, dressed in scrubs  Level of Consciousness: Alert  Behavior/Cooperation: normal, cooperative  Psychomotor: psychomotor retardation   Speech: normal tone, normal pitch, normal volume, slowed  Language: english, fluid  Orientation: person, place, situation, day of week, month of year, year  Attention Span/Concentration: spelled "WORLD" forwards and backwards  Memory: Intact  Mood: "good"  Affect: blunted  Thought Process: tangential, hyper-Cheondoism  Associations: tangential  Thought Content: hallucinations: (auditory: yes), suicidal thoughts: (passive-yes), homicidal thoughts: (passive-yes)  Fund of Knowledge: Aware of current events  Abstraction: proverbs were concrete  Insight: fair  Judgment: poor        "   Significant Labs:   Last 24 Hours:   Recent Lab Results       12/17/20  1704   12/17/20  1657   12/17/20  1639        Benzodiazepines Negative         Methadone metabolites Negative         Phencyclidine Negative         Acetaminophen (Tylenol), Serum     <3.0  Comment:  Toxic Levels:  Adults (4 hr post-ingestion).........>150 ug/mL  Adults (12 hr post-ingestion)........>40 ug/mL  Peds (2 hr post-ingestion, liquid)...>225 ug/mL       Albumin     4.9     Alcohol, Serum     <10     Alkaline Phosphatase     80     ALT     15     Amphetamine Screen, Ur Negative         Anion Gap     13     Appearance, UA Clear         AST     19     Barbiturate Screen, Ur Negative         Baso #     0.03     Basophil %     0.4     Bilirubin (UA) Negative         BILIRUBIN TOTAL     0.6  Comment:  For infants and newborns, interpretation of results should be based  on gestational age, weight and in agreement with clinical  observations.  Premature Infant recommended reference ranges:  Up to 24 hours.............<8.0 mg/dL  Up to 48 hours............<12.0 mg/dL  3-5 days..................<15.0 mg/dL  6-29 days.................<15.0 mg/dL       BUN     15     Calcium     9.5     Chloride     104     CO2     22     Cocaine (Metab.) Negative         Color, UA Yellow         Creatinine     0.9     Creatinine, Urine 232.0  Comment:  The random urine reference ranges provided were established   for 24 hour urine collections.  No reference ranges exist for  random urine specimens.  Correlate clinically.           Differential Method     Automated     eGFR if      >60.0     eGFR if non      >60.0  Comment:  Calculation used to obtain the estimated glomerular filtration  rate (eGFR) is the CKD-EPI equation.        Eos #     0.1     Eosinophil %     1.2     Glucose     92     Glucose, UA Negative         Gran # (ANC)     4.1     Gran %     49.2     Hematocrit     41.3     Hemoglobin     13.5     Immature Grans  (Abs)     0.01  Comment:  Mild elevation in immature granulocytes is non specific and   can be seen in a variety of conditions including stress response,   acute inflammation, trauma and pregnancy. Correlation with other   laboratory and clinical findings is essential.       Immature Granulocytes     0.1     Ketones, UA 1+         Leukocytes, UA Negative         Lymph #     3.3     Lymph %     39.8     MCH     28.8     MCHC     32.7     MCV     88     Mono #     0.8     Mono %     9.3     MPV     9.4     NITRITE UA Negative         nRBC     0     Occult Blood UA Negative         Opiate Scrn, Ur Negative         pH, UA 6.0         Platelets     340     Potassium     3.5     PROTEIN TOTAL     8.3     Protein, UA Negative  Comment:  Recommend a 24 hour urine protein or a urine   protein/creatinine ratio if globulin induced proteinuria is  clinically suspected.            Acceptable   Yes       RBC     4.68     RDW     12.5     SARS-CoV-2 RNA, Amplification, Qual   Negative       Sodium     139     Specific [place], UA 1.030         Specimen UA Urine, Clean Catch         Marijuana (THC) Metabolite Presumptive Positive         Toxicology Information SEE COMMENT  Comment:  This screen includes the following classes of drugs at the   listed cut-off:  Benzodiazepines                  200 ng/ml  Methadone                        300 ng/ml  Cocaine metabolite               300 ng/ml  Opiates                          300 ng/ml  Barbiturates                     200 ng/ml  Amphetamines                    1000 ng/ml  Marijuana metabs (THC)            50 ng/ml  Phencyclidine (PCP)               25 ng/ml  High concentrations of Diphenhydramine may cross-react with  Phencyclidine PCP screening immunoassay giving a false   positive result.  High concentrations of Methylenedioxymethamphetamine (MDMA aka  Ectasy) and other structurally similar compounds may cross-   react with the Amphetamine/Methamphetamine screening    immunoassay giving a false positive result.  A metabolite of the anti-HIV drug Sustiva () may cause  false positive results in the Marijuana metabolite (THC)   screening assay.  Note: This exception list includes only more common   interferants in toxicology screen testing.  Because of many   cross-reactantspositive results on toxicology drug screens   should be confirmed whenever results do not correlate with   clinical presentation.  This report is intended for use in clinical monitoring and  management of patients. It is not intended for use in   employment related drug testing.  Because of any cross-reactants, positive results on toxicology  drug screens should be confirmed whenever results do not  correlate with clinical presentation.  Presumptive positive results are unconfirmed and may be used   only for medical purposes.  Assay Intended Use: This assay provides only a preliminary analytical  test result. A more specific alternate chemical method must be used  to obtain a confirmed analytical result. Gas chromatography/mass  spectrometry (GS/MS)is the preferred confirmatory method. Clinical  consideration and professional judgement should be applied to any   drug of abuse test result, particularly when preliminary results  are used.           TSH     0.544     WBC     8.37           Significant Imaging: None

## 2020-12-18 NOTE — CONSULTS
"[facility]  Psychiatry  Consult Note    Patient Name: Price Godoy  MRN: [id number]   Code Status: Prior  Admission Date: 12/17/2020  Hospital Length of Stay: 0 days  Attending Physician: Rashel Saldana DO  Primary Care Provider: Primary Doctor No    Current Legal Status: none    Patient information was obtained from patient and ER records.   Inpatient consult to Psychiatry  Consult performed by: Rashida Mcclendon MD  Consult ordered by: Rashel Saldana DO  Reason for consult: Psych ER eval        Subjective:     Principal Problem:<principal problem not specified>    Chief Complaint:  SI, HI, AVH    HPI: History of Present Illness:   Price Godoy is a 25 y.o. male with a past psychiatric history of Schizoaffective disorder and Cannabis use disoder, currently presenting with <principal problem not specified>. Emergency Psychiatry was originally consulted to address the patient's symptoms of suicidal ideation, homicidal ideation and auditory hallucinations.     Per ED RN(s):  25 y.o. male to ED with c.o. suicidal and homicidal ideation x "a few days". Patient has no specific plan but states "if I had a gun I would shoot myself". Patient states he does not have access to guns. Patient reports he has been out of his psych medication for 2-3 months. Patient endorses auditory hallucinations that tell him to kill himself and to "obey". Patient laughing during conversation and states he is hearing voices currently. Patient denies all medical complaints at this time. Patient denies n/v/d, denies fever/chills, denies chest pain/ cough/ shortness of breath. Patient awake, alert, and oriented x 4. No apparent distress noted. VS currently stable. Patient assisted onto stretcher and given scrubs to wear. All items/ furniture removed from room. Bed placed in low locked position, side rails up x 2, sitter at bedside, orientation to room and explanation of wait provided to patient, awaiting MD " "evaluation and orders, will continue to monitor.        Per ED MD:  25-year-old male history of depression has been noncompliant his medications for over 3 months presents with increased auditory and visual hallucinations.  I have been going on for about a month.  He states they just tell an to obey.  He states that he is to obey God.  He states he is having intermittent thoughts of wanting to kill himself.  Denies any plan but states that if he had access to a gun he might shoot himself.  He however, does not have access to guns.  Denies any fevers or chills or any other complaints.        Per Psychiatry:  Upon initiation of interview, pt was found sitting on hospital bed. He was calm and cooperative throughout interview. Patient said he has been having racing thoughts. He said the content was to hurt people or hurt self. When asked what people, he said "kids." Patient said he has been hearing voices for the past 7 years, but they get better when he is taking medicine.  Specifically, the voices have been saying kid's names and telling him to run car off the road. Patient said he was taking Invega Trinza, but has not had injections in the past 2-3 months, because the nurse gave him injections in the arm and he wanted them in the hip. Patient endorsed not sleeping for the past couple of days. He also endorsed going to the Casino and clubs, and being more impulsive over the past few weeks. Patient said he was previously getting a shot at [facility] and last was hospitalized at [facility] one year ago. Patient also endorsed using THC, but denied other drugs or alcohol. Patient was linear throughout some points of the interview, but then would become tangential and interject hyperreligious statements like " Danyel Zaid  so we can live in abundance." Patient endorsed wanting to be back on medications. Finally he endorsed that he has never hurt himself before, but would have shot himself if he owned a " gun.     Psychiatric Review of Systems:  sleep: yes, issues with sleep (slept for 5 hours last night, but prior to that had not slept in 3 days)  appetite: no, good  weight: no  energy/anergy: yes, low  interest/pleasure/anhedonia: no, likes to play pool  somatic symptoms: no  libido: no  anxiety/panic: yes  guilty/hopelessness: yes, sometimes  concentration: yes trouble with concentration  S.I.B.s/risky behavior: yes, has been going to casino and clubs  any drugs: yes, THC  alcohol: yes, a glass of wine every 2 weeks      Medical Review Of Systems:  Pertinent items noted in HPI     Psychiatric History:  Diagnose(s): Yes - Schizoaffective Disorder  Previous Medication Trials: Yes - Invega, Geodon, Prozac, Cogentin  Previous Psychiatric Hospitalizations: Yes - most recently [facility] one year ago  Family Psychiatric History: No  Outpatient Psychiatrist: No  Outpatient Therapist: No     Suicide/Violence Risk Assessment:  Current/active suicidal ideation/plan/intent: Yes - passive SI  Previous suicide attempts: No  Current/active homicidal ideation/plan/intent: Yes - Passive HI towards kids  History of threats/arrests associated with violent conduct - No  Access to firearms/lethal weapons - No     Social History:  Marital Status: single  Children: 0   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 Yes -   Legal consequences of chemical use: No     Legal History:  Past Charges/Incarcerations: Yes   Pending Charges: Yes     Psychosocial Factors:  Stressors: none.   Functioning Relationships: good support system     Collateral:   Yes -  "Father- Russell [phone]  Patient's father endorsed that patient lives in an apartment by himself, but works with him. He saw him today and patient told him the voices were bothering him. In addition, patient's dad saw patient actively responding to the voices today. He endorsed patient stopped getting Invega injections 3 months ago, because patient said he did not need it.  Patient also told his father that he has not been sleeping for the past couple of days. Patient's father feels that patient is going "downhill" without medication and would like him to get help.    Hospital Course: No notes on file         Patient History           Medical as of 12/17/2020     Past Medical History     Diagnosis Date Comments Source    Anxiety -- -- Provider    Depression -- -- Provider                  Surgical as of 12/17/2020     Past Surgical History     Procedure Laterality Date Comments Source    APPENDECTOMY -- -- -- Provider                  Family as of 12/17/2020     Problem Relation Name Age of Onset Comments Source    Mental illness Brother -- -- -- Provider    Schizophrenia Maternal Uncle -- -- -- Provider    Diabetes Maternal Grandmother -- -- -- Provider    Diabetes Paternal Grandmother -- -- -- Provider            Tobacco Use as of 12/17/2020     Smoking Status Smoking Start Date Smoking Quit Date Packs/Day Years Used    Current Every Day Smoker -- -- 0.50 --    Types Comments Smokeless Tobacco Status Smokeless Tobacco Quit Date Source     Cigarettes -- Never Used -- Provider            Alcohol Use as of 12/17/2020     Alcohol Use Drinks/Week Alcohol/Week Comments Source    Yes 1 Cans of beer 1.0 standard drinks rarely Provider    Frequency Typical Drinks Binge Drinking        -- -- --              Drug Use as of 12/17/2020     Drug Use Types Frequency Comments Source    Not Currently  Marijuana -- day 3 detox Provider            Sexual Activity as of 12/17/2020     Sexually Active Birth Control Partners Comments " Source    Never -- -- -- Provider            Activities of Daily Living as of 12/17/2020    None           Social Documentation as of 12/17/2020    None           Occupational as of 12/17/2020    None           Socioeconomic as of 12/17/2020     Marital Status Spouse Name Number of Children Years Education Education Level Preferred Language Ethnicity Race Source    Single -- -- -- -- English // Black or  Provider    Financial Resource Strain Food Insecurity: Worry Food Insecurity: Inability Transportation Needs: Medical Transportation Needs: Non-medical    -- -- -- -- --            Pertinent History     Question Response Comments    Lives with -- --    Place in Birth Order -- --    Lives in -- --    Number of Siblings -- --    Raised by -- --    Legal Involvement -- --    Childhood Trauma -- --    Criminal History of -- --    Financial Status -- --    Highest Level of Education -- --    Does patient have access to a firearm? -- --     Service -- --    Primary Leisure Activity -- --    Spirituality -- --        Past Medical History:   Diagnosis Date    Anxiety     Depression      Past Surgical History:   Procedure Laterality Date    APPENDECTOMY       Family History     Problem Relation (Age of Onset)    Diabetes Maternal Grandmother, Paternal Grandmother    Mental illness Brother    Schizophrenia Maternal Uncle        Tobacco Use    Smoking status: Current Every Day Smoker     Packs/day: 0.50     Types: Cigarettes    Smokeless tobacco: Never Used   Substance and Sexual Activity    Alcohol use: Yes     Alcohol/week: 1.0 standard drinks     Types: 1 Cans of beer per week     Comment: rarely    Drug use: Not Currently     Types: Marijuana     Comment: day 3 detox    Sexual activity: Never     Review of patient's allergies indicates:   Allergen Reactions    Amoxicillin        No current facility-administered medications on file prior to encounter.      Current  "Outpatient Medications on File Prior to Encounter   Medication Sig    benztropine (COGENTIN) 2 MG Tab Take 1 tablet (2 mg total) by mouth 2 (two) times daily.    cloZAPine (CLOZARIL) 100 MG Tab Take 1 tablet (100 mg total) by mouth every evening.    diphenhydrAMINE (BENADRYL) 25 mg capsule Take 1 each (25 mg total) by mouth every 6 (six) hours as needed (face twisting).    famotidine (PEPCID) 20 MG tablet Take 1 tablet (20 mg total) by mouth once daily.    FLUoxetine 20 MG capsule Take 1 capsule (20 mg total) by mouth once daily.    hydrocortisone 1 % cream Apply to affected area 2 times daily    paliperidone palmitate (INVEGA TRINZA IM) Inject into the muscle.    psyllium 0.52 gram capsule Take 1 capsule (0.52 g total) by mouth once daily.    ziprasidone (GEODON) 20 MG Cap Take 4 capsules (80 mg total) by mouth 2 (two) times daily with meals.     Psychotherapeutics (From admission, onward)    Start     Stop Route Frequency Ordered    12/17/20 2015  paliperidone 24 hr tablet 6 mg      12/18 0814 Oral ED 1 Time 12/17/20 1941        Review of Systems  Strengths and Liabilities: Strength: Patient has positive support network., Liability: Patient has poor judgment    Objective:     Vital Signs (Most Recent):  Temp: 98.4 °F (36.9 °C) (12/17/20 1937)  Pulse: 92 (12/17/20 1937)  Resp: 18 (12/17/20 1937)  BP: (!) 114/56 (12/17/20 1937)  SpO2: 96 % (12/17/20 1937) Vital Signs (24h Range):  Temp:  [98.4 °F (36.9 °C)-98.9 °F (37.2 °C)] 98.4 °F (36.9 °C)  Pulse:  [69-92] 92  Resp:  [16-18] 18  SpO2:  [96 %-98 %] 96 %  BP: (114-138)/(56-83) 114/56     Height: 5' 11" (180.3 cm)  Weight: 127 kg (280 lb)  Body mass index is 39.05 kg/m².    No intake or output data in the 24 hours ending 12/17/20 2006    Physical Exam  Psychiatric:      Comments: Mental Status Exam:  Appearance: unremarkable, age appropriate, dressed in scrubs  Level of Consciousness: Alert  Behavior/Cooperation: normal, cooperative  Psychomotor: " "psychomotor retardation   Speech: normal tone, normal pitch, normal volume, slowed  Language: english, fluid  Orientation: person, place, situation, day of week, month of year, year  Attention Span/Concentration: spelled "WORLD" forwards and backwards  Memory: Intact  Mood: "good"  Affect: blunted  Thought Process: tangential, hyper-Samaritan  Associations: tangential  Thought Content: hallucinations: (auditory: yes), suicidal thoughts: (passive-yes), homicidal thoughts: (passive-yes)  Fund of Knowledge: Aware of current events  Abstraction: proverbs were concrete  Insight: fair  Judgment: poor          Significant Labs:   Last 24 Hours:   Recent Lab Results       12/17/20  1704   12/17/20  1657   12/17/20  1639        Benzodiazepines Negative         Methadone metabolites Negative         Phencyclidine Negative         Acetaminophen (Tylenol), Serum     <3.0  Comment:  Toxic Levels:  Adults (4 hr post-ingestion).........>150 ug/mL  Adults (12 hr post-ingestion)........>40 ug/mL  Peds (2 hr post-ingestion, liquid)...>225 ug/mL       Albumin     4.9     Alcohol, Serum     <10     Alkaline Phosphatase     80     ALT     15     Amphetamine Screen, Ur Negative         Anion Gap     13     Appearance, UA Clear         AST     19     Barbiturate Screen, Ur Negative         Baso #     0.03     Basophil %     0.4     Bilirubin (UA) Negative         BILIRUBIN TOTAL     0.6  Comment:  For infants and newborns, interpretation of results should be based  on gestational age, weight and in agreement with clinical  observations.  Premature Infant recommended reference ranges:  Up to 24 hours.............<8.0 mg/dL  Up to 48 hours............<12.0 mg/dL  3-5 days..................<15.0 mg/dL  6-29 days.................<15.0 mg/dL       BUN     15     Calcium     9.5     Chloride     104     CO2     22     Cocaine (Metab.) Negative         Color, UA Yellow         Creatinine     0.9     Creatinine, Urine 232.0  Comment:  The random " urine reference ranges provided were established   for 24 hour urine collections.  No reference ranges exist for  random urine specimens.  Correlate clinically.           Differential Method     Automated     eGFR if      >60.0     eGFR if non      >60.0  Comment:  Calculation used to obtain the estimated glomerular filtration  rate (eGFR) is the CKD-EPI equation.        Eos #     0.1     Eosinophil %     1.2     Glucose     92     Glucose, UA Negative         Gran # (ANC)     4.1     Gran %     49.2     Hematocrit     41.3     Hemoglobin     13.5     Immature Grans (Abs)     0.01  Comment:  Mild elevation in immature granulocytes is non specific and   can be seen in a variety of conditions including stress response,   acute inflammation, trauma and pregnancy. Correlation with other   laboratory and clinical findings is essential.       Immature Granulocytes     0.1     Ketones, UA 1+         Leukocytes, UA Negative         Lymph #     3.3     Lymph %     39.8     MCH     28.8     MCHC     32.7     MCV     88     Mono #     0.8     Mono %     9.3     MPV     9.4     NITRITE UA Negative         nRBC     0     Occult Blood UA Negative         Opiate Scrn, Ur Negative         pH, UA 6.0         Platelets     340     Potassium     3.5     PROTEIN TOTAL     8.3     Protein, UA Negative  Comment:  Recommend a 24 hour urine protein or a urine   protein/creatinine ratio if globulin induced proteinuria is  clinically suspected.            Acceptable   Yes       RBC     4.68     RDW     12.5     SARS-CoV-2 RNA, Amplification, Qual   Negative       Sodium     139     Specific [place], UA 1.030         Specimen UA Urine, Clean Catch         Marijuana (THC) Metabolite Presumptive Positive         Toxicology Information SEE COMMENT  Comment:  This screen includes the following classes of drugs at the   listed cut-off:  Benzodiazepines                  200 ng/ml  Methadone                         300 ng/ml  Cocaine metabolite               300 ng/ml  Opiates                          300 ng/ml  Barbiturates                     200 ng/ml  Amphetamines                    1000 ng/ml  Marijuana metabs (THC)            50 ng/ml  Phencyclidine (PCP)               25 ng/ml  High concentrations of Diphenhydramine may cross-react with  Phencyclidine PCP screening immunoassay giving a false   positive result.  High concentrations of Methylenedioxymethamphetamine (MDMA aka  Ectasy) and other structurally similar compounds may cross-   react with the Amphetamine/Methamphetamine screening   immunoassay giving a false positive result.  A metabolite of the anti-HIV drug Sustiva () may cause  false positive results in the Marijuana metabolite (THC)   screening assay.  Note: This exception list includes only more common   interferants in toxicology screen testing.  Because of many   cross-reactantspositive results on toxicology drug screens   should be confirmed whenever results do not correlate with   clinical presentation.  This report is intended for use in clinical monitoring and  management of patients. It is not intended for use in   employment related drug testing.  Because of any cross-reactants, positive results on toxicology  drug screens should be confirmed whenever results do not  correlate with clinical presentation.  Presumptive positive results are unconfirmed and may be used   only for medical purposes.  Assay Intended Use: This assay provides only a preliminary analytical  test result. A more specific alternate chemical method must be used  to obtain a confirmed analytical result. Gas chromatography/mass  spectrometry (GS/MS)is the preferred confirmatory method. Clinical  consideration and professional judgement should be applied to any   drug of abuse test result, particularly when preliminary results  are used.           TSH     0.544     WBC     8.37           Significant Imaging:  None    Assessment/Plan:     Schizoaffective disorder, depressive type  ASSESSMENT      Price Godoy is a 25 y.o. male with a past psychiatric history of Schizoaffective disorder and Cannabis use disoder, currently presenting with <principal problem not specified>.  Emergency Psychiatry was originally consulted to address the patient's symptoms of suicidal ideation, homicidal ideation and auditory hallucinations.     Schizoaffective Disorder  Cannabis Use Disorder     IMPRESSION  Although, patient was not actively responding to internal stimuli when being interviewed, he was tangential and hyper-Yazidism. He also reported SI/HI and AVH with voices telling him to drive off road and hurt kids. Per father, patient was acting bizrarre today and was responding to internal stimuli. Patient has been med non-compliant for 2-3 months and warrants inpatient hospitalization for stabilization     RECOMMENDATION(S)       1. Scheduled Medication(s):  Invega 6mg PO daily     2. PRN Medication(s):  Hadol 5mg/Ativan 2mg PO/IM q 6hrs for non-redirectable agitation     3. Legal Status/Precaution(s):  Recomnded PEC at this time as the patient is currently gravely disabled. Seek inpatient bed for patient safety and stabilization when/if medically cleared by the ER MD. Continue to observe patient's behavior while in the ER and reassess the patient daily until placement is found.        In cases of emergency, daily coverage provided by Acute/ED Psych MD, NP, or SW, with associated contact numbers listed in the [facility] On Call Schedule.     Case discussed with emergency psychiatry staff: Dr. Arthur              Total Time:  60 minutes      Rashida Mcclendon MD   Psychiatry PGY-2  [facility]

## 2020-12-18 NOTE — ASSESSMENT & PLAN NOTE
ASSESSMENT      Price Godoy is a 25 y.o. male with a past psychiatric history of Schizoaffective disorder and Cannabis use disoder, currently presenting with <principal problem not specified>.  Emergency Psychiatry was originally consulted to address the patient's symptoms of suicidal ideation, homicidal ideation and auditory hallucinations.     Schizoaffective Disorder  Cannabis Use Disorder     IMPRESSION  Although, patient was not actively responding to internal stimuli when being interviewed, he was tangential and hyper-Amish. He also reported SI/HI and AVH with voices telling him to drive off road and hurt kids. Per father, patient was acting bizrarre today and was responding to internal stimuli. Patient has been med non-compliant for 2-3 months and warrants inpatient hospitalization for stabilization     RECOMMENDATION(S)       1. Scheduled Medication(s):  Invega 6mg PO daily     2. PRN Medication(s):  Hadol 5mg/Ativan 2mg PO/IM q 6hrs for non-redirectable agitation     3. Legal Status/Precaution(s):  Recomnded PEC at this time as the patient is currently gravely disabled. Seek inpatient bed for patient safety and stabilization when/if medically cleared by the ER MD. Continue to observe patient's behavior while in the ER and reassess the patient daily until placement is found.        In cases of emergency, daily coverage provided by Acute/ED Psych MD, NP, or SW, with associated contact numbers listed in the [facility] On Call Schedule.     Case discussed with emergency psychiatry staff: Dr. Arthur

## 2020-12-18 NOTE — ED NOTES
Contacted pharmacy, [facility] w/ Jose M who states will call ED pharmacist as Invega was tubed to the ED. Will await call back.

## 2020-12-27 PROBLEM — F32.9 MAJOR DEPRESSION: Status: RESOLVED | Noted: 2019-07-25 | Resolved: 2020-12-27

## 2020-12-27 PROBLEM — F29 PSYCHOSIS: Status: RESOLVED | Noted: 2020-12-18 | Resolved: 2020-12-27

## 2020-12-29 ENCOUNTER — PATIENT OUTREACH (OUTPATIENT)
Dept: ADMINISTRATIVE | Facility: CLINIC | Age: 25
End: 2020-12-29

## 2020-12-29 NOTE — PROGRESS NOTES
C3 nurse attempted to contact patient. No answer.  C3 nurse attempted to contact Price KASIE Godoy for a TCC post hospital discharge follow up call. No answer at phone number listed and no voicemail available. The patient does not have a scheduled HOSFU appointment within 7-14 days post hospital discharge date 12/27/2020 Message sent to Physician staff to assist with HOSFU appointment scheduling.

## 2020-12-30 NOTE — PATIENT INSTRUCTIONS
Psychosis  Psychosis is a symptom of certain mental health problems. It involves perceiving reality differently from those around you. The difference between reality and what you think become blurred in your mind. Other mental health conditions, physical diseases, traumatic experiences, or drugs and toxins may bring on psychotic symptoms or behavior.  There are different kinds of psychosis:  · Drug-induced (due to alcohol, methamphetamine, cocaine, LSD, PCP and others)  · Bipolar disorder  · Depression  · Schizophrenia  · Dementia  Symptoms  The symptoms of psychosis may not all be the same for each person. However, they usually involve:  · Hallucinations. Seeing, hearing, feeling, or even tasting or smelling things that are not there  · Delusions. Believing something that is not true, or false beliefs that are not part of a person's [employer]ist or cultural background.  There may also be disturbances in thinking, speech and behavior, which can include:  · Hearing voices that others do not hear  · Seeing things that others do not see  · Racing thoughts  · Lack of energy  · Feeling very fearful  · Disorganized speech  · Intentional or unintentional bodily harm to others  · Paranoia  · Trouble thinking or concentrating clearly  · Depression, feeling suicidal  · Insomnia  · Withdrawal from those around you  Treatment for psychosis depends on the cause. Medicine, with or without psychotherapy, is often used.  Home care  · Find a healthcare provider and therapist who meet your needs.  Seek help when you feel like your symptoms are returning  · Be certain to tell each of your healthcare providers about all of the prescription drugs, over-the-counter medicines, and supplements you take. Certain supplements interact with medicines and can result in dangerous side effects. Ask your pharmacist when you have questions about drug interactions.  · Be sure to take your medicine as directed even if you think you don't need  it.  · Follow-up with lab tests as advised by your healthcare provider.  · Talk with your family about your feelings and thoughts. Ask them to help you recognize any behavior changes so you can get help and, if needed, medicines can be adjusted.  Follow-up care  Follow up with your counselor, therapist or psychiatrist as advised.  Call 911  Call 911 if you:  · Have suicidal thoughts, a suicide plan, and the means to carry out the plan  · Have troubled breathing  · Are very confused  · Are very drowsy or have trouble awakening  · Faint or lose consciousness  · Have a rapid heart rate, very low heart rate, or a new irregular heart rate  · Have a seizure  When to seek medical advice  Call your healthcare provider right away if any of these occur:  · Gradual or rapid return of psychotic symptoms  · Feeling like you want to harm yourself or another  · Feeling extremely depressed  · Feeling very anxious, agitated, or angry  · Feeling out of control or being controlled by others  · Unable to care for yourself  · Seeing things or hearing voices that you know aren't real  Date Last Reviewed: 9/29/2015  © 3794-0358 [employer]. [address] [place] [address]. All rights reserved. This information is not intended as a substitute for professional medical care. Always follow your healthcare professional's instructions.

## 2020-12-30 NOTE — PROGRESS NOTES
Please forward this important TCC information to your provider in order to maximize the post discharge care delivery of this patient.    C3 nurse spoke with Price Godoy  for a TCC post hospital discharge follow up call. The patient does not have a scheduled HOSFU appointment with Primary Doctor No within 7-14 days post hospital discharge date 12/27/2020. C3 nurse was unable to schedule HOSFU appointment in [facility].  Please contact pcp and schedule follow up appointment using HOSFU visit type on or before 01/11/2021    Respectfully,  Imani Murray LPN    [facility]    [email or web address]       Please do not reply to this message, as this inbox is not routinely monitored.

## 2021-02-15 ENCOUNTER — HOSPITAL ENCOUNTER (EMERGENCY)
Facility: HOSPITAL | Age: 26
Discharge: HOME | End: 2021-02-16
Attending: EMERGENCY MEDICINE
Payer: MEDICAID

## 2021-02-15 VITALS
RESPIRATION RATE: 18 BRPM | TEMPERATURE: 99 F | DIASTOLIC BLOOD PRESSURE: 90 MMHG | WEIGHT: 230 LBS | OXYGEN SATURATION: 99 % | SYSTOLIC BLOOD PRESSURE: 124 MMHG | HEIGHT: 71 IN | BODY MASS INDEX: 32.2 KG/M2 | HEART RATE: 74 BPM

## 2021-02-15 DIAGNOSIS — R55 NEAR SYNCOPE: ICD-10-CM

## 2021-02-15 DIAGNOSIS — S01.111A LACERATION OF RIGHT EYEBROW, INITIAL ENCOUNTER: Primary | ICD-10-CM

## 2021-02-15 DIAGNOSIS — F12.90 MARIJUANA USE: ICD-10-CM

## 2021-02-15 PROCEDURE — 80047 BASIC METABLC PNL IONIZED CA: CPT

## 2021-02-15 PROCEDURE — 93005 ELECTROCARDIOGRAM TRACING: CPT

## 2021-02-15 PROCEDURE — 99284 EMERGENCY DEPT VISIT MOD MDM: CPT | Mod: 25,,, | Performed by: PHYSICIAN ASSISTANT

## 2021-02-15 PROCEDURE — 99284 PR EMERGENCY DEPT VISIT,LEVEL IV: ICD-10-PCS | Mod: 25,,, | Performed by: PHYSICIAN ASSISTANT

## 2021-02-15 PROCEDURE — 90471 IMMUNIZATION ADMIN: CPT | Performed by: PHYSICIAN ASSISTANT

## 2021-02-15 PROCEDURE — 25000003 PHARM REV CODE 250: Performed by: PHYSICIAN ASSISTANT

## 2021-02-15 PROCEDURE — 93010 EKG 12-LEAD: ICD-10-PCS | Mod: ,,, | Performed by: INTERNAL MEDICINE

## 2021-02-15 PROCEDURE — 12011 PR RESUPERF WND FACE <2.5 CM: ICD-10-PCS | Mod: ,,, | Performed by: PHYSICIAN ASSISTANT

## 2021-02-15 PROCEDURE — 90715 TDAP VACCINE 7 YRS/> IM: CPT | Performed by: PHYSICIAN ASSISTANT

## 2021-02-15 PROCEDURE — 12011 RPR F/E/E/N/L/M 2.5 CM/<: CPT | Mod: ,,, | Performed by: PHYSICIAN ASSISTANT

## 2021-02-15 PROCEDURE — 99284 EMERGENCY DEPT VISIT MOD MDM: CPT | Mod: 25

## 2021-02-15 PROCEDURE — 12011 RPR F/E/E/N/L/M 2.5 CM/<: CPT | Mod: RT

## 2021-02-15 PROCEDURE — 63600175 PHARM REV CODE 636 W HCPCS: Performed by: PHYSICIAN ASSISTANT

## 2021-02-15 PROCEDURE — 93010 ELECTROCARDIOGRAM REPORT: CPT | Mod: ,,, | Performed by: INTERNAL MEDICINE

## 2021-02-15 RX ORDER — LIDOCAINE HYDROCHLORIDE 10 MG/ML
5 INJECTION, SOLUTION EPIDURAL; INFILTRATION; INTRACAUDAL; PERINEURAL
Status: COMPLETED | OUTPATIENT
Start: 2021-02-15 | End: 2021-02-15

## 2021-02-15 RX ORDER — NAPROXEN 500 MG/1
500 TABLET ORAL 2 TIMES DAILY WITH MEALS
Qty: 10 TABLET | Refills: 0 | Status: ON HOLD | OUTPATIENT
Start: 2021-02-15 | End: 2021-03-26 | Stop reason: HOSPADM

## 2021-02-15 RX ADMIN — CLOSTRIDIUM TETANI TOXOID ANTIGEN (FORMALDEHYDE INACTIVATED), CORYNEBACTERIUM DIPHTHERIAE TOXOID ANTIGEN (FORMALDEHYDE INACTIVATED), BORDETELLA PERTUSSIS TOXOID ANTIGEN (GLUTARALDEHYDE INACTIVATED), BORDETELLA PERTUSSIS FILAMENTOUS HEMAGGLUTININ ANTIGEN (FORMALDEHYDE INACTIVATED), BORDETELLA PERTUSSIS PERTACTIN ANTIGEN, AND BORDETELLA PERTUSSIS FIMBRIAE 2/3 ANTIGEN 0.5 ML: 5; 2; 2.5; 5; 3; 5 INJECTION, SUSPENSION INTRAMUSCULAR at 11:02

## 2021-02-15 RX ADMIN — LIDOCAINE HYDROCHLORIDE 50 MG: 10 INJECTION, SOLUTION EPIDURAL; INFILTRATION; INTRACAUDAL at 11:02

## 2021-02-17 LAB
BUN SERPL-MCNC: 17 MG/DL (ref 6–30)
BUN SERPL-MCNC: 23 MG/DL (ref 6–30)
CHLORIDE SERPL-SCNC: 105 MMOL/L (ref 95–110)
CHLORIDE SERPL-SCNC: 107 MMOL/L (ref 95–110)
CREAT SERPL-MCNC: 1.2 MG/DL (ref 0.5–1.4)
CREAT SERPL-MCNC: 1.4 MG/DL (ref 0.5–1.4)
GLUCOSE SERPL-MCNC: 96 MG/DL (ref 70–110)
GLUCOSE SERPL-MCNC: 98 MG/DL (ref 70–110)
HCT VFR BLD CALC: 41 %PCV (ref 36–54)
HCT VFR BLD CALC: 41 %PCV (ref 36–54)
POC IONIZED CALCIUM: 1.02 MMOL/L (ref 1.06–1.42)
POC IONIZED CALCIUM: 1.12 MMOL/L (ref 1.06–1.42)
POC TCO2 (MEASURED): 26 MMOL/L (ref 23–29)
POC TCO2 (MEASURED): 27 MMOL/L (ref 23–29)
POTASSIUM BLD-SCNC: 3.9 MMOL/L (ref 3.5–5.1)
POTASSIUM BLD-SCNC: 6.2 MMOL/L (ref 3.5–5.1)
SAMPLE: ABNORMAL
SAMPLE: NORMAL
SODIUM BLD-SCNC: 136 MMOL/L (ref 136–145)
SODIUM BLD-SCNC: 139 MMOL/L (ref 136–145)

## 2021-03-14 ENCOUNTER — HOSPITAL ENCOUNTER (EMERGENCY)
Facility: HOSPITAL | Age: 26
Discharge: PSYCHIATRIC OR SUBSTANCE USE FACILITY | End: 2021-03-15
Attending: EMERGENCY MEDICINE
Payer: MEDICAID

## 2021-03-14 DIAGNOSIS — Z00.8 MEDICAL CLEARANCE FOR PSYCHIATRIC ADMISSION: ICD-10-CM

## 2021-03-14 DIAGNOSIS — R45.850 HOMICIDAL IDEATION: Primary | ICD-10-CM

## 2021-03-14 DIAGNOSIS — F29 PSYCHOSIS, UNSPECIFIED PSYCHOSIS TYPE: ICD-10-CM

## 2021-03-14 LAB
ALBUMIN SERPL BCP-MCNC: 4.4 G/DL (ref 3.5–5.2)
ALP SERPL-CCNC: 62 U/L (ref 55–135)
ALT SERPL W/O P-5'-P-CCNC: 17 U/L (ref 10–44)
AMPHET+METHAMPHET UR QL: NEGATIVE
ANION GAP SERPL CALC-SCNC: 11 MMOL/L (ref 8–16)
APAP SERPL-MCNC: <3 UG/ML (ref 10–20)
AST SERPL-CCNC: 18 U/L (ref 10–40)
BACTERIA #/AREA URNS HPF: ABNORMAL /HPF
BARBITURATES UR QL SCN>200 NG/ML: NEGATIVE
BASOPHILS # BLD AUTO: 0.05 K/UL (ref 0–0.2)
BASOPHILS NFR BLD: 0.5 % (ref 0–1.9)
BENZODIAZ UR QL SCN>200 NG/ML: NEGATIVE
BILIRUB SERPL-MCNC: 0.7 MG/DL (ref 0.1–1)
BILIRUB UR QL STRIP: ABNORMAL
BUN SERPL-MCNC: 10 MG/DL (ref 6–20)
BZE UR QL SCN: NEGATIVE
CALCIUM SERPL-MCNC: 8.6 MG/DL (ref 8.7–10.5)
CANNABINOIDS UR QL SCN: ABNORMAL
CHLORIDE SERPL-SCNC: 107 MMOL/L (ref 95–110)
CLARITY UR: CLEAR
CO2 SERPL-SCNC: 21 MMOL/L (ref 23–29)
COLOR UR: ABNORMAL
CREAT SERPL-MCNC: 1 MG/DL (ref 0.5–1.4)
CREAT UR-MCNC: >450 MG/DL (ref 23–375)
DIFFERENTIAL METHOD: ABNORMAL
EOSINOPHIL # BLD AUTO: 0.4 K/UL (ref 0–0.5)
EOSINOPHIL NFR BLD: 4.7 % (ref 0–8)
ERYTHROCYTE [DISTWIDTH] IN BLOOD BY AUTOMATED COUNT: 12.2 % (ref 11.5–14.5)
EST. GFR  (AFRICAN AMERICAN): >60 ML/MIN/1.73 M^2
EST. GFR  (NON AFRICAN AMERICAN): >60 ML/MIN/1.73 M^2
ETHANOL SERPL-MCNC: <10 MG/DL
GLUCOSE SERPL-MCNC: 93 MG/DL (ref 70–110)
GLUCOSE UR QL STRIP: NEGATIVE
HCT VFR BLD AUTO: 36.4 % (ref 40–54)
HGB BLD-MCNC: 12.1 G/DL (ref 14–18)
HGB UR QL STRIP: NEGATIVE
HYALINE CASTS #/AREA URNS LPF: 10 /LPF
IMM GRANULOCYTES # BLD AUTO: 0.02 K/UL (ref 0–0.04)
IMM GRANULOCYTES NFR BLD AUTO: 0.2 % (ref 0–0.5)
KETONES UR QL STRIP: ABNORMAL
LEUKOCYTE ESTERASE UR QL STRIP: NEGATIVE
LYMPHOCYTES # BLD AUTO: 2.9 K/UL (ref 1–4.8)
LYMPHOCYTES NFR BLD: 31.1 % (ref 18–48)
MCH RBC QN AUTO: 28.9 PG (ref 27–31)
MCHC RBC AUTO-ENTMCNC: 33.2 G/DL (ref 32–36)
MCV RBC AUTO: 87 FL (ref 82–98)
METHADONE UR QL SCN>300 NG/ML: NEGATIVE
MICROSCOPIC COMMENT: ABNORMAL
MONOCYTES # BLD AUTO: 0.9 K/UL (ref 0.3–1)
MONOCYTES NFR BLD: 10 % (ref 4–15)
NEUTROPHILS # BLD AUTO: 4.9 K/UL (ref 1.8–7.7)
NEUTROPHILS NFR BLD: 53.5 % (ref 38–73)
NITRITE UR QL STRIP: NEGATIVE
NRBC BLD-RTO: 0 /100 WBC
OPIATES UR QL SCN: NEGATIVE
PCP UR QL SCN>25 NG/ML: NEGATIVE
PH UR STRIP: 6 [PH] (ref 5–8)
PLATELET # BLD AUTO: 281 K/UL (ref 150–350)
PMV BLD AUTO: 9.5 FL (ref 9.2–12.9)
POTASSIUM SERPL-SCNC: 3.2 MMOL/L (ref 3.5–5.1)
PROT SERPL-MCNC: 7.4 G/DL (ref 6–8.4)
PROT UR QL STRIP: ABNORMAL
RBC # BLD AUTO: 4.18 M/UL (ref 4.6–6.2)
RBC #/AREA URNS HPF: 2 /HPF (ref 0–4)
SARS-COV-2 RDRP RESP QL NAA+PROBE: NEGATIVE
SODIUM SERPL-SCNC: 139 MMOL/L (ref 136–145)
SP GR UR STRIP: 1.03 (ref 1–1.03)
TOXICOLOGY INFORMATION: ABNORMAL
URN SPEC COLLECT METH UR: ABNORMAL
UROBILINOGEN UR STRIP-ACNC: NEGATIVE EU/DL
WBC # BLD AUTO: 9.22 K/UL (ref 3.9–12.7)
WBC #/AREA URNS HPF: 0 /HPF (ref 0–5)

## 2021-03-14 PROCEDURE — 82077 ASSAY SPEC XCP UR&BREATH IA: CPT | Performed by: EMERGENCY MEDICINE

## 2021-03-14 PROCEDURE — 80307 DRUG TEST PRSMV CHEM ANLYZR: CPT | Performed by: EMERGENCY MEDICINE

## 2021-03-14 PROCEDURE — U0002 COVID-19 LAB TEST NON-CDC: HCPCS | Performed by: EMERGENCY MEDICINE

## 2021-03-14 PROCEDURE — 80053 COMPREHEN METABOLIC PANEL: CPT | Performed by: EMERGENCY MEDICINE

## 2021-03-14 PROCEDURE — 85025 COMPLETE CBC W/AUTO DIFF WBC: CPT | Performed by: EMERGENCY MEDICINE

## 2021-03-14 PROCEDURE — 84443 ASSAY THYROID STIM HORMONE: CPT | Performed by: EMERGENCY MEDICINE

## 2021-03-14 PROCEDURE — 80143 DRUG ASSAY ACETAMINOPHEN: CPT | Performed by: EMERGENCY MEDICINE

## 2021-03-14 PROCEDURE — 99285 EMERGENCY DEPT VISIT HI MDM: CPT | Mod: 25

## 2021-03-14 PROCEDURE — 81000 URINALYSIS NONAUTO W/SCOPE: CPT | Mod: 59 | Performed by: EMERGENCY MEDICINE

## 2021-03-15 VITALS
BODY MASS INDEX: 39.9 KG/M2 | HEIGHT: 71 IN | RESPIRATION RATE: 20 BRPM | SYSTOLIC BLOOD PRESSURE: 126 MMHG | OXYGEN SATURATION: 97 % | HEART RATE: 80 BPM | DIASTOLIC BLOOD PRESSURE: 71 MMHG | WEIGHT: 285 LBS | TEMPERATURE: 97 F

## 2021-03-15 PROBLEM — E87.6 HYPOKALEMIA: Status: ACTIVE | Noted: 2021-03-15

## 2021-03-15 PROBLEM — R06.02 SOB (SHORTNESS OF BREATH): Status: ACTIVE | Noted: 2021-03-15

## 2021-03-15 PROBLEM — R45.850 HOMICIDAL IDEATION: Status: ACTIVE | Noted: 2021-03-15

## 2021-03-15 PROBLEM — Z13.9 ENCOUNTER FOR MEDICAL SCREENING EXAMINATION: Status: ACTIVE | Noted: 2021-03-15

## 2021-03-15 LAB — TSH SERPL DL<=0.005 MIU/L-ACNC: 1.17 UIU/ML (ref 0.4–4)

## 2021-03-15 PROCEDURE — 25000003 PHARM REV CODE 250: Performed by: EMERGENCY MEDICINE

## 2021-03-15 RX ORDER — POTASSIUM CHLORIDE 20 MEQ/1
40 TABLET, EXTENDED RELEASE ORAL
Status: COMPLETED | OUTPATIENT
Start: 2021-03-15 | End: 2021-03-15

## 2021-03-15 RX ADMIN — POTASSIUM CHLORIDE 40 MEQ: 1500 TABLET, EXTENDED RELEASE ORAL at 12:03

## 2021-06-14 PROBLEM — Z13.9 ENCOUNTER FOR MEDICAL SCREENING EXAMINATION: Status: RESOLVED | Noted: 2021-03-15 | Resolved: 2021-06-14

## 2021-10-26 ENCOUNTER — HOSPITAL ENCOUNTER (EMERGENCY)
Facility: HOSPITAL | Age: 26
Discharge: PSYCHIATRIC OR SUBSTANCE USE FACILITY | End: 2021-10-27
Attending: EMERGENCY MEDICINE
Payer: MEDICAID

## 2021-10-26 VITALS
DIASTOLIC BLOOD PRESSURE: 74 MMHG | SYSTOLIC BLOOD PRESSURE: 122 MMHG | BODY MASS INDEX: 37.66 KG/M2 | HEART RATE: 68 BPM | OXYGEN SATURATION: 98 % | WEIGHT: 270 LBS | TEMPERATURE: 98 F | RESPIRATION RATE: 18 BRPM

## 2021-10-26 DIAGNOSIS — R45.850 HOMICIDAL IDEATION: ICD-10-CM

## 2021-10-26 DIAGNOSIS — R45.851 SUICIDAL IDEATION: Primary | ICD-10-CM

## 2021-10-26 DIAGNOSIS — F12.10 MARIJUANA ABUSE: ICD-10-CM

## 2021-10-26 LAB
ALBUMIN SERPL BCP-MCNC: 4.6 G/DL (ref 3.5–5.2)
ALP SERPL-CCNC: 71 U/L (ref 55–135)
ALT SERPL W/O P-5'-P-CCNC: 15 U/L (ref 10–44)
AMPHET+METHAMPHET UR QL: NEGATIVE
ANION GAP SERPL CALC-SCNC: 10 MMOL/L (ref 8–16)
APAP SERPL-MCNC: <3 UG/ML (ref 10–20)
AST SERPL-CCNC: 13 U/L (ref 10–40)
BACTERIA #/AREA URNS HPF: ABNORMAL /HPF
BARBITURATES UR QL SCN>200 NG/ML: NEGATIVE
BASOPHILS # BLD AUTO: 0.06 K/UL (ref 0–0.2)
BASOPHILS NFR BLD: 0.8 % (ref 0–1.9)
BENZODIAZ UR QL SCN>200 NG/ML: NEGATIVE
BILIRUB SERPL-MCNC: 0.6 MG/DL (ref 0.1–1)
BILIRUB UR QL STRIP: NEGATIVE
BUN SERPL-MCNC: 13 MG/DL (ref 6–20)
BZE UR QL SCN: NEGATIVE
CALCIUM SERPL-MCNC: 9 MG/DL (ref 8.7–10.5)
CANNABINOIDS UR QL SCN: ABNORMAL
CHLORIDE SERPL-SCNC: 108 MMOL/L (ref 95–110)
CLARITY UR: CLEAR
CO2 SERPL-SCNC: 22 MMOL/L (ref 23–29)
COLOR UR: YELLOW
CREAT SERPL-MCNC: 1 MG/DL (ref 0.5–1.4)
CREAT UR-MCNC: >450 MG/DL (ref 23–375)
CTP QC/QA: YES
DIFFERENTIAL METHOD: ABNORMAL
EOSINOPHIL # BLD AUTO: 0.2 K/UL (ref 0–0.5)
EOSINOPHIL NFR BLD: 2.5 % (ref 0–8)
ERYTHROCYTE [DISTWIDTH] IN BLOOD BY AUTOMATED COUNT: 12 % (ref 11.5–14.5)
EST. GFR  (AFRICAN AMERICAN): >60 ML/MIN/1.73 M^2
EST. GFR  (NON AFRICAN AMERICAN): >60 ML/MIN/1.73 M^2
ETHANOL SERPL-MCNC: <10 MG/DL
GLUCOSE SERPL-MCNC: 94 MG/DL (ref 70–110)
GLUCOSE UR QL STRIP: NEGATIVE
HCT VFR BLD AUTO: 38.6 % (ref 40–54)
HGB BLD-MCNC: 12.9 G/DL (ref 14–18)
HGB UR QL STRIP: NEGATIVE
HYALINE CASTS #/AREA URNS LPF: 2 /LPF
IMM GRANULOCYTES # BLD AUTO: 0.02 K/UL (ref 0–0.04)
IMM GRANULOCYTES NFR BLD AUTO: 0.3 % (ref 0–0.5)
KETONES UR QL STRIP: ABNORMAL
LEUKOCYTE ESTERASE UR QL STRIP: NEGATIVE
LYMPHOCYTES # BLD AUTO: 3.1 K/UL (ref 1–4.8)
LYMPHOCYTES NFR BLD: 41.6 % (ref 18–48)
MCH RBC QN AUTO: 29.6 PG (ref 27–31)
MCHC RBC AUTO-ENTMCNC: 33.4 G/DL (ref 32–36)
MCV RBC AUTO: 89 FL (ref 82–98)
METHADONE UR QL SCN>300 NG/ML: NEGATIVE
MICROSCOPIC COMMENT: ABNORMAL
MONOCYTES # BLD AUTO: 0.7 K/UL (ref 0.3–1)
MONOCYTES NFR BLD: 8.8 % (ref 4–15)
NEUTROPHILS # BLD AUTO: 3.4 K/UL (ref 1.8–7.7)
NEUTROPHILS NFR BLD: 46 % (ref 38–73)
NITRITE UR QL STRIP: NEGATIVE
NRBC BLD-RTO: 0 /100 WBC
OPIATES UR QL SCN: NEGATIVE
PCP UR QL SCN>25 NG/ML: NEGATIVE
PH UR STRIP: 6 [PH] (ref 5–8)
PLATELET # BLD AUTO: 312 K/UL (ref 150–450)
PMV BLD AUTO: 9.1 FL (ref 9.2–12.9)
POTASSIUM SERPL-SCNC: 3.7 MMOL/L (ref 3.5–5.1)
PROT SERPL-MCNC: 7.8 G/DL (ref 6–8.4)
PROT UR QL STRIP: ABNORMAL
RBC # BLD AUTO: 4.36 M/UL (ref 4.6–6.2)
RBC #/AREA URNS HPF: 3 /HPF (ref 0–4)
SARS-COV-2 RDRP RESP QL NAA+PROBE: NEGATIVE
SODIUM SERPL-SCNC: 140 MMOL/L (ref 136–145)
SP GR UR STRIP: >1.03 (ref 1–1.03)
SQUAMOUS #/AREA URNS HPF: 0 /HPF
TOXICOLOGY INFORMATION: ABNORMAL
TSH SERPL DL<=0.005 MIU/L-ACNC: 1.54 UIU/ML (ref 0.4–4)
URN SPEC COLLECT METH UR: ABNORMAL
UROBILINOGEN UR STRIP-ACNC: NEGATIVE EU/DL
WBC # BLD AUTO: 7.46 K/UL (ref 3.9–12.7)
WBC #/AREA URNS HPF: 2 /HPF (ref 0–5)

## 2021-10-26 PROCEDURE — 99215 PR OFFICE/OUTPT VISIT, EST, LEVL V, 40-54 MIN: ICD-10-PCS | Mod: 95,,, | Performed by: PSYCHIATRY & NEUROLOGY

## 2021-10-26 PROCEDURE — 99215 OFFICE O/P EST HI 40 MIN: CPT | Mod: 95,,, | Performed by: PSYCHIATRY & NEUROLOGY

## 2021-10-26 PROCEDURE — 84443 ASSAY THYROID STIM HORMONE: CPT | Performed by: EMERGENCY MEDICINE

## 2021-10-26 PROCEDURE — 80053 COMPREHEN METABOLIC PANEL: CPT | Performed by: EMERGENCY MEDICINE

## 2021-10-26 PROCEDURE — 80307 DRUG TEST PRSMV CHEM ANLYZR: CPT | Performed by: EMERGENCY MEDICINE

## 2021-10-26 PROCEDURE — 82077 ASSAY SPEC XCP UR&BREATH IA: CPT | Performed by: EMERGENCY MEDICINE

## 2021-10-26 PROCEDURE — 81000 URINALYSIS NONAUTO W/SCOPE: CPT | Mod: 59 | Performed by: EMERGENCY MEDICINE

## 2021-10-26 PROCEDURE — 80143 DRUG ASSAY ACETAMINOPHEN: CPT | Performed by: EMERGENCY MEDICINE

## 2021-10-26 PROCEDURE — 96372 THER/PROPH/DIAG INJ SC/IM: CPT | Mod: 59

## 2021-10-26 PROCEDURE — U0002 COVID-19 LAB TEST NON-CDC: HCPCS | Performed by: EMERGENCY MEDICINE

## 2021-10-26 PROCEDURE — 63600175 PHARM REV CODE 636 W HCPCS: Performed by: EMERGENCY MEDICINE

## 2021-10-26 PROCEDURE — 85025 COMPLETE CBC W/AUTO DIFF WBC: CPT | Performed by: EMERGENCY MEDICINE

## 2021-10-26 PROCEDURE — 99291 CRITICAL CARE FIRST HOUR: CPT | Mod: 25

## 2021-10-26 RX ORDER — HALOPERIDOL 5 MG/ML
5 INJECTION INTRAMUSCULAR
Status: COMPLETED | OUTPATIENT
Start: 2021-10-26 | End: 2021-10-26

## 2021-10-26 RX ADMIN — HALOPERIDOL LACTATE 5 MG: 5 INJECTION, SOLUTION INTRAMUSCULAR at 09:10

## 2021-10-26 RX ADMIN — LORAZEPAM 2 MG: 2 INJECTION INTRAMUSCULAR; INTRAVENOUS at 09:10

## 2022-05-28 ENCOUNTER — HOSPITAL ENCOUNTER (EMERGENCY)
Facility: HOSPITAL | Age: 27
Discharge: PSYCHIATRIC OR SUBSTANCE USE FACILITY | End: 2022-05-29
Attending: EMERGENCY MEDICINE
Payer: MEDICAID

## 2022-05-28 DIAGNOSIS — R45.851 SUICIDAL IDEATION: Primary | ICD-10-CM

## 2022-05-28 DIAGNOSIS — F25.1 SCHIZOAFFECTIVE DISORDER, DEPRESSIVE TYPE: ICD-10-CM

## 2022-05-28 LAB
ALBUMIN SERPL BCP-MCNC: 4.7 G/DL (ref 3.5–5.2)
ALP SERPL-CCNC: 67 U/L (ref 55–135)
ALT SERPL W/O P-5'-P-CCNC: 13 U/L (ref 10–44)
ANION GAP SERPL CALC-SCNC: 15 MMOL/L (ref 8–16)
AST SERPL-CCNC: 14 U/L (ref 10–40)
BASOPHILS # BLD AUTO: 0.05 K/UL (ref 0–0.2)
BASOPHILS NFR BLD: 0.5 % (ref 0–1.9)
BILIRUB SERPL-MCNC: 0.4 MG/DL (ref 0.1–1)
BUN SERPL-MCNC: 18 MG/DL (ref 6–20)
CALCIUM SERPL-MCNC: 9.9 MG/DL (ref 8.7–10.5)
CHLORIDE SERPL-SCNC: 104 MMOL/L (ref 95–110)
CO2 SERPL-SCNC: 18 MMOL/L (ref 23–29)
CREAT SERPL-MCNC: 1.1 MG/DL (ref 0.5–1.4)
CTP QC/QA: YES
DIFFERENTIAL METHOD: ABNORMAL
EOSINOPHIL # BLD AUTO: 0.3 K/UL (ref 0–0.5)
EOSINOPHIL NFR BLD: 2.6 % (ref 0–8)
ERYTHROCYTE [DISTWIDTH] IN BLOOD BY AUTOMATED COUNT: 12.1 % (ref 11.5–14.5)
EST. GFR  (AFRICAN AMERICAN): >60 ML/MIN/1.73 M^2
EST. GFR  (NON AFRICAN AMERICAN): >60 ML/MIN/1.73 M^2
ETHANOL SERPL-MCNC: <10 MG/DL
GLUCOSE SERPL-MCNC: 92 MG/DL (ref 70–110)
HCT VFR BLD AUTO: 42.2 % (ref 40–54)
HGB BLD-MCNC: 14 G/DL (ref 14–18)
IMM GRANULOCYTES # BLD AUTO: 0.05 K/UL (ref 0–0.04)
IMM GRANULOCYTES NFR BLD AUTO: 0.5 % (ref 0–0.5)
LYMPHOCYTES # BLD AUTO: 3.6 K/UL (ref 1–4.8)
LYMPHOCYTES NFR BLD: 34.6 % (ref 18–48)
MCH RBC QN AUTO: 28.9 PG (ref 27–31)
MCHC RBC AUTO-ENTMCNC: 33.2 G/DL (ref 32–36)
MCV RBC AUTO: 87 FL (ref 82–98)
MONOCYTES # BLD AUTO: 0.8 K/UL (ref 0.3–1)
MONOCYTES NFR BLD: 7.6 % (ref 4–15)
NEUTROPHILS # BLD AUTO: 5.6 K/UL (ref 1.8–7.7)
NEUTROPHILS NFR BLD: 54.2 % (ref 38–73)
NRBC BLD-RTO: 0 /100 WBC
PLATELET # BLD AUTO: 334 K/UL (ref 150–450)
PMV BLD AUTO: 9.2 FL (ref 9.2–12.9)
POTASSIUM SERPL-SCNC: 3.8 MMOL/L (ref 3.5–5.1)
PROT SERPL-MCNC: 8.2 G/DL (ref 6–8.4)
RBC # BLD AUTO: 4.85 M/UL (ref 4.6–6.2)
SARS-COV-2 RDRP RESP QL NAA+PROBE: NEGATIVE
SODIUM SERPL-SCNC: 137 MMOL/L (ref 136–145)
TSH SERPL DL<=0.005 MIU/L-ACNC: 1.14 UIU/ML (ref 0.4–4)
WBC # BLD AUTO: 10.38 K/UL (ref 3.9–12.7)

## 2022-05-28 PROCEDURE — U0002 COVID-19 LAB TEST NON-CDC: HCPCS | Performed by: EMERGENCY MEDICINE

## 2022-05-28 PROCEDURE — 99285 PR EMERGENCY DEPT VISIT,LEVEL V: ICD-10-PCS | Mod: CS,,, | Performed by: EMERGENCY MEDICINE

## 2022-05-28 PROCEDURE — 99285 EMERGENCY DEPT VISIT HI MDM: CPT | Mod: 25

## 2022-05-28 PROCEDURE — 96372 THER/PROPH/DIAG INJ SC/IM: CPT | Performed by: STUDENT IN AN ORGANIZED HEALTH CARE EDUCATION/TRAINING PROGRAM

## 2022-05-28 PROCEDURE — 82077 ASSAY SPEC XCP UR&BREATH IA: CPT | Performed by: EMERGENCY MEDICINE

## 2022-05-28 PROCEDURE — 99285 EMERGENCY DEPT VISIT HI MDM: CPT | Mod: CS,,, | Performed by: EMERGENCY MEDICINE

## 2022-05-28 PROCEDURE — 84443 ASSAY THYROID STIM HORMONE: CPT | Performed by: EMERGENCY MEDICINE

## 2022-05-28 PROCEDURE — 85025 COMPLETE CBC W/AUTO DIFF WBC: CPT | Performed by: EMERGENCY MEDICINE

## 2022-05-28 PROCEDURE — 80053 COMPREHEN METABOLIC PANEL: CPT | Performed by: EMERGENCY MEDICINE

## 2022-05-28 PROCEDURE — 63600175 PHARM REV CODE 636 W HCPCS: Performed by: STUDENT IN AN ORGANIZED HEALTH CARE EDUCATION/TRAINING PROGRAM

## 2022-05-28 RX ORDER — HALOPERIDOL 5 MG/ML
5 INJECTION INTRAMUSCULAR
Status: DISCONTINUED | OUTPATIENT
Start: 2022-05-28 | End: 2022-05-28

## 2022-05-28 RX ORDER — HALOPERIDOL 5 MG/ML
5 INJECTION INTRAMUSCULAR
Status: COMPLETED | OUTPATIENT
Start: 2022-05-28 | End: 2022-05-28

## 2022-05-28 RX ADMIN — HALOPERIDOL LACTATE 5 MG: 5 INJECTION, SOLUTION INTRAMUSCULAR at 10:05

## 2022-05-29 VITALS
HEART RATE: 62 BPM | RESPIRATION RATE: 15 BRPM | TEMPERATURE: 98 F | DIASTOLIC BLOOD PRESSURE: 65 MMHG | OXYGEN SATURATION: 98 % | SYSTOLIC BLOOD PRESSURE: 123 MMHG

## 2022-05-29 LAB
AMPHET+METHAMPHET UR QL: NEGATIVE
BARBITURATES UR QL SCN>200 NG/ML: NEGATIVE
BENZODIAZ UR QL SCN>200 NG/ML: NEGATIVE
BILIRUB UR QL STRIP: NEGATIVE
BZE UR QL SCN: NEGATIVE
CANNABINOIDS UR QL SCN: ABNORMAL
CLARITY UR REFRACT.AUTO: CLEAR
COLOR UR AUTO: YELLOW
CREAT UR-MCNC: 174 MG/DL (ref 23–375)
GLUCOSE UR QL STRIP: NEGATIVE
HGB UR QL STRIP: NEGATIVE
KETONES UR QL STRIP: NEGATIVE
LEUKOCYTE ESTERASE UR QL STRIP: NEGATIVE
METHADONE UR QL SCN>300 NG/ML: NEGATIVE
NITRITE UR QL STRIP: NEGATIVE
OPIATES UR QL SCN: NEGATIVE
PCP UR QL SCN>25 NG/ML: NEGATIVE
PH UR STRIP: 5 [PH] (ref 5–8)
PROT UR QL STRIP: NEGATIVE
SP GR UR STRIP: 1.03 (ref 1–1.03)
TOXICOLOGY INFORMATION: ABNORMAL
URN SPEC COLLECT METH UR: NORMAL

## 2022-05-29 PROCEDURE — 87591 N.GONORRHOEAE DNA AMP PROB: CPT | Performed by: EMERGENCY MEDICINE

## 2022-05-29 PROCEDURE — 80307 DRUG TEST PRSMV CHEM ANLYZR: CPT | Performed by: EMERGENCY MEDICINE

## 2022-05-29 PROCEDURE — 87491 CHLMYD TRACH DNA AMP PROBE: CPT | Performed by: EMERGENCY MEDICINE

## 2022-05-29 PROCEDURE — 81003 URINALYSIS AUTO W/O SCOPE: CPT | Mod: 59 | Performed by: EMERGENCY MEDICINE

## 2022-05-29 NOTE — ED NOTES
UA and drug screen needed prior to transport pt. Pt educated on need to provide sample in order to leave ER and be transferred. Pt nods in understanding. Urinal at bedside.

## 2022-05-29 NOTE — ED NOTES
Pt care assumed. Report received by HOMERO Georges. Pt lying in stretcher in low and locked position and side rails raised x2. VS stable and NAD currently.

## 2022-05-29 NOTE — ED NOTES
"Patient identifiers for Price Godoy 27 y.o. male checked and correct.  Chief Complaint   Patient presents with    Psychiatric Evaluation     Pt brought in by [facility] with OPC. Pt father called, stating pt was [place] Father reports pt not eating/drinking properly and not sleeping.      Past Medical History:   Diagnosis Date    Anxiety     Depression     History of psychiatric hospitalization     Suicide attempt      Allergies reported:   Review of patient's allergies indicates:   Allergen Reactions    Amoxicillin          LOC: Patient is awake, alert, and aware of environment with an appropriate affect. Patient is refusing to answer any question and simply saying either "No" or "I don't know"..  APPEARANCE: Patient resting comfortably and in no acute distress. Patient is clean and well groomed, patient's clothing is properly fastened.  HEENT: - JVD, + midline trach, - bruising or lacerations to face or neck.  SKIN: The skin is warm and dry. Patient has normal skin turgor and moist mucus membranes.   MUSKULOSKELETAL: Patient is moving all extremities well, no obvious deformities noted. Pulses intact.   RESPIRATORY: Airway is open and patent. Respirations are spontaneous and non-labored with normal effort and rate.  CARDIAC: Patient has a normal rate and rhythm. No peripheral edema noted.   ABDOMEN: No distention noted. Soft and non-tender upon palpation.  NEUROLOGICAL: pupils 3 mm, PERRL. Facial expression is symmetrical. Hand grasps are equal bilaterally. Normal sensation in all extremities when touched with finger.        "

## 2022-05-29 NOTE — ED NOTES
Pt belongings collected and stored in closet per PEC protocol:  Green t-shirt, Black sweat pants, Gray socks, and pair of blue shoes.

## 2022-05-29 NOTE — ED NOTES
Spoke with Alem Lake at [facility] concerning pt's drug toxicity urine panel.  Mrs. Lake was inquiring as to when the test will result so the pt can be placed at a behavior health facility.  I reached out to Yumi in the lab and she will get in contact with lab chemistry to explore the current status of the test further.
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
## 2022-05-29 NOTE — ED PROVIDER NOTES
Encounter Date: 5/28/2022       History     Chief Complaint   Patient presents with    Psychiatric Evaluation     Pt brought in by [facility] with OPC. Pt father called, stating pt was [place] Father reports pt not eating/drinking properly and not sleeping.      Patient is a 27-year-old male with a past medical history of anxiety, depression, schizoaffective disorder and prior suicide attempts presenting to the emergency department for a psychiatric evaluation. He was brought in by [facility] with an OPC in place. His father placed the OPC, stating that patient was [place]. The patient is alert, but does not answer any questions including current suiciadlity/homicidal ideations.        Review of patient's allergies indicates:   Allergen Reactions    Amoxicillin      Past Medical History:   Diagnosis Date    Anxiety     Depression     History of psychiatric hospitalization     Suicide attempt      Past Surgical History:   Procedure Laterality Date    APPENDECTOMY       Family History   Problem Relation Age of Onset    Mental illness Brother     Schizophrenia Maternal Uncle     Diabetes Maternal Grandmother     Diabetes Paternal Grandmother      Social History     Tobacco Use    Smoking status: Current Every Day Smoker     Packs/day: 0.50     Years: 2.00     Pack years: 1.00     Types: Cigarettes    Smokeless tobacco: Never Used   Substance Use Topics    Alcohol use: Yes     Alcohol/week: 1.0 standard drink     Types: 1 Cans of beer per week     Comment: rarely    Drug use: Yes     Types: Marijuana     Comment: last use tonight     Review of Systems   Unable to perform ROS: Psychiatric disorder       Physical Exam     Initial Vitals [05/28/22 2120]   BP Pulse Resp Temp SpO2   138/79 94 20 97.8 °F (36.6 °C) 97 %      MAP       --         Physical Exam    Nursing note and vitals  reviewed.  Constitutional: He appears well-developed. He is not diaphoretic. He is Obese . No distress.   Alert, but uncooperative and does not respond to questions, although is non-combative.   HENT:   Head: Normocephalic and atraumatic.   Right Ear: External ear normal.   Left Ear: External ear normal.   Neck: Neck supple.   Cardiovascular: Normal rate, regular rhythm, normal heart sounds and intact distal pulses.   Pulmonary/Chest: Breath sounds normal. No respiratory distress. He has no wheezes. He has no rhonchi. He has no rales.   Abdominal: Abdomen is soft. He exhibits no distension. There is no abdominal tenderness. There is no rebound and no guarding.   Musculoskeletal:      Cervical back: Neck supple.     Neurological: He is alert and oriented to person, place, and time. GCS score is 15. GCS eye subscore is 4. GCS verbal subscore is 5. GCS motor subscore is 6.   Skin: Skin is warm. Capillary refill takes less than 2 seconds. No rash noted.   Psychiatric: He has a normal mood and affect. He expresses impulsivity and inappropriate judgment.   Laughs inappropriately. Responding to internal stimuli.         ED Course   Procedures  Labs Reviewed   CBC W/ AUTO DIFFERENTIAL - Abnormal; Notable for the following components:       Result Value    Immature Grans (Abs) 0.05 (*)     All other components within normal limits   COMPREHENSIVE METABOLIC PANEL - Abnormal; Notable for the following components:    CO2 18 (*)     All other components within normal limits   C. TRACHOMATIS/N. GONORRHOEAE BY AMP DNA   TSH   ALCOHOL,MEDICAL (ETHANOL)   URINALYSIS, REFLEX TO URINE CULTURE   DRUG SCREEN PANEL, URINE EMERGENCY   SARS-COV-2 RDRP GENE          Imaging Results    None          Medications   haloperidol lactate injection 5 mg (5 mg Intramuscular Given 5/28/22 2232)     Medical Decision Making:   History:   I obtained history from: someone other than patient.  Old Medical Records: I decided to obtain old medical  records.  Initial Assessment:   Emergent evaluation for suicidal gestures/ideation.  He is afebrile and hemodynamically stable.  Differential Diagnosis:   Schizoaffective disorder, decompensated psychosis, substance induced psychosis  Clinical Tests:   Lab Tests: Ordered and Reviewed  ED Management:  Uncooperative and responding to internal stimuli. Labs are unremarkable. PEC placed.  He is medically cleared for suicidal ideation and grave disability.                      Clinical Impression:   Final diagnoses:  [R45.851] Suicidal ideation (Primary)  [F25.1] Schizoaffective disorder, depressive type                 Matt Clarke MD  Resident  05/28/22 3035

## 2022-05-31 LAB
C TRACH DNA SPEC QL NAA+PROBE: NOT DETECTED
N GONORRHOEA DNA SPEC QL NAA+PROBE: NOT DETECTED

## 2022-06-01 PROBLEM — R51.9 HEADACHE: Status: ACTIVE | Noted: 2022-06-01

## 2022-06-03 ENCOUNTER — HOSPITAL ENCOUNTER (EMERGENCY)
Facility: HOSPITAL | Age: 27
Discharge: PSYCHIATRIC OR SUBSTANCE USE FACILITY | End: 2022-06-05
Attending: EMERGENCY MEDICINE
Payer: MEDICAID

## 2022-06-03 DIAGNOSIS — R55 SYNCOPE: ICD-10-CM

## 2022-06-03 DIAGNOSIS — F20.9 SCHIZOPHRENIA, UNSPECIFIED TYPE: Primary | ICD-10-CM

## 2022-06-03 LAB — POCT GLUCOSE: 109 MG/DL (ref 70–110)

## 2022-06-03 PROCEDURE — 99285 EMERGENCY DEPT VISIT HI MDM: CPT | Mod: 25

## 2022-06-03 PROCEDURE — 82962 GLUCOSE BLOOD TEST: CPT

## 2022-06-03 PROCEDURE — 93010 EKG 12-LEAD: ICD-10-PCS | Mod: ,,, | Performed by: INTERNAL MEDICINE

## 2022-06-03 PROCEDURE — 93005 ELECTROCARDIOGRAM TRACING: CPT

## 2022-06-03 PROCEDURE — 93010 ELECTROCARDIOGRAM REPORT: CPT | Mod: ,,, | Performed by: INTERNAL MEDICINE

## 2022-06-03 PROCEDURE — 25000003 PHARM REV CODE 250: Performed by: EMERGENCY MEDICINE

## 2022-06-03 RX ORDER — LORAZEPAM 1 MG/1
1 TABLET ORAL EVERY 4 HOURS PRN
Status: DISCONTINUED | OUTPATIENT
Start: 2022-06-03 | End: 2022-06-05 | Stop reason: HOSPADM

## 2022-06-03 RX ORDER — CLONIDINE HYDROCHLORIDE 0.1 MG/1
0.1 TABLET ORAL NIGHTLY
Status: DISCONTINUED | OUTPATIENT
Start: 2022-06-03 | End: 2022-06-05 | Stop reason: HOSPADM

## 2022-06-03 RX ORDER — RISPERIDONE 1 MG/1
2 TABLET ORAL 2 TIMES DAILY
Status: DISCONTINUED | OUTPATIENT
Start: 2022-06-03 | End: 2022-06-03

## 2022-06-03 RX ORDER — MIRTAZAPINE 15 MG/1
30 TABLET, ORALLY DISINTEGRATING ORAL NIGHTLY
Status: DISCONTINUED | OUTPATIENT
Start: 2022-06-03 | End: 2022-06-05 | Stop reason: HOSPADM

## 2022-06-03 RX ORDER — RISPERIDONE 1 MG/1
2 TABLET, ORALLY DISINTEGRATING ORAL 2 TIMES DAILY
Status: DISCONTINUED | OUTPATIENT
Start: 2022-06-03 | End: 2022-06-05 | Stop reason: HOSPADM

## 2022-06-03 RX ORDER — QUETIAPINE FUMARATE 100 MG/1
100 TABLET, FILM COATED ORAL NIGHTLY
Status: DISCONTINUED | OUTPATIENT
Start: 2022-06-03 | End: 2022-06-05 | Stop reason: HOSPADM

## 2022-06-03 RX ADMIN — CLONIDINE HYDROCHLORIDE 0.1 MG: 0.1 TABLET ORAL at 08:06

## 2022-06-03 RX ADMIN — QUETIAPINE FUMARATE 100 MG: 100 TABLET ORAL at 08:06

## 2022-06-03 RX ADMIN — RISPERIDONE 2 MG: 1 TABLET, ORALLY DISINTEGRATING ORAL at 08:06

## 2022-06-03 RX ADMIN — MIRTAZAPINE 30 MG: 15 TABLET, ORALLY DISINTEGRATING ORAL at 08:06
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
## 2022-06-03 NOTE — ED NOTES
Pt. Was not transported with current active legal status (CE[facility]). [facility] staff was contacted by DEYSI Green--charge nurse.

## 2022-06-03 NOTE — ED NOTES
Pt. Returned frtom ct scan per stretcher. Pt. Transported to ct scan with sitter, ED staff and security.
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
## 2022-06-04 PROCEDURE — 99215 OFFICE O/P EST HI 40 MIN: CPT | Mod: 95,,, | Performed by: PSYCHIATRY & NEUROLOGY

## 2022-06-04 PROCEDURE — 99215 PR OFFICE/OUTPT VISIT, EST, LEVL V, 40-54 MIN: ICD-10-PCS | Mod: 95,,, | Performed by: PSYCHIATRY & NEUROLOGY

## 2022-06-04 PROCEDURE — 25000003 PHARM REV CODE 250: Performed by: EMERGENCY MEDICINE

## 2022-06-04 RX ADMIN — CLONIDINE HYDROCHLORIDE 0.1 MG: 0.1 TABLET ORAL at 08:06

## 2022-06-04 RX ADMIN — QUETIAPINE FUMARATE 100 MG: 100 TABLET ORAL at 08:06

## 2022-06-04 RX ADMIN — MIRTAZAPINE 30 MG: 15 TABLET, ORALLY DISINTEGRATING ORAL at 08:06

## 2022-06-04 RX ADMIN — RISPERIDONE 2 MG: 1 TABLET, ORALLY DISINTEGRATING ORAL at 08:06

## 2022-06-04 RX ADMIN — LORAZEPAM 1 MG: 1 TABLET ORAL at 07:06

## 2022-06-04 NOTE — ED NOTES
Pt. Is awake and taken to bathroom per wheelchair with sitter. Pt. Is asking repeatedly to call his family. Remains calm presently. Pt. Was given a second food tray.

## 2022-06-04 NOTE — ED NOTES
Pt has had x3, full trays today, took meds willingly, and has been calm and ambu;atory to restroom.

## 2022-06-04 NOTE — ED NOTES
Assumed care of pt under CEC order. Currently pt calm and sleeping. Pt wakes up easily to voice. No requests at this time and states that he would like to sleep. +A/O x 4 w/ ABCs intact, NAD. VSS.     Review of patient's allergies indicates:   Allergen Reactions    Amoxicillin         Patient has verified the spelling of their name and  on armband.   APPEARANCE: Patient is alert, calm, oriented x 4, and does not appear distressed.  SKIN: Skin is normal for race, warm, and dry. Normal skin turgor and mucous membranes moist.  CARDIAC: Normal rate and rhythm, no murmur heard.   RESPIRATORY:Normal rate and effort. Breath sounds clear bilaterally throughout chest. Respirations are equal and unlabored.    GASTRO: Bowel sounds normal, abdomen is soft, no tenderness, and no abdominal distention.  MUSCLE: Full ROM. No bony tenderness or soft tissue tenderness. No obvious deformity.  PERIPHERAL VASCULAR: peripheral pulses present. Normal cap refill. No edema. Warm to touch.  NEURO: 5/5 strength major flexors/extensors bilaterally. Sensory intact to light touch bilaterally. Richard coma scale: eyes open spontaneously-4, oriented & converses-5, obeys commands-6. No neurological abnormalities.   MENTAL STATUS: awake, alert and aware of environment.  : Voids without complication      Sitter at bedside.
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
## 2022-06-04 NOTE — ED NOTES
PT meal tray delivered by dietary. PT requested a fork and it was explained that he can not be given one at this time due to current PEC status. PT verbalized understanding.

## 2022-06-05 VITALS
DIASTOLIC BLOOD PRESSURE: 72 MMHG | BODY MASS INDEX: 37.8 KG/M2 | HEART RATE: 112 BPM | SYSTOLIC BLOOD PRESSURE: 130 MMHG | TEMPERATURE: 98 F | WEIGHT: 271 LBS | RESPIRATION RATE: 18 BRPM | OXYGEN SATURATION: 99 %

## 2022-06-05 PROCEDURE — 25000003 PHARM REV CODE 250: Performed by: EMERGENCY MEDICINE

## 2022-06-05 RX ADMIN — LORAZEPAM 1 MG: 1 TABLET ORAL at 08:06

## 2022-06-05 RX ADMIN — RISPERIDONE 2 MG: 1 TABLET, ORALLY DISINTEGRATING ORAL at 08:06

## 2022-09-05 PROBLEM — Z13.9 ENCOUNTER FOR MEDICAL SCREENING EXAMINATION: Status: RESOLVED | Noted: 2021-03-15 | Resolved: 2022-09-05

## 2022-12-29 ENCOUNTER — HOSPITAL ENCOUNTER (EMERGENCY)
Facility: HOSPITAL | Age: 27
Discharge: PSYCHIATRIC OR SUBSTANCE USE FACILITY | End: 2022-12-29
Attending: EMERGENCY MEDICINE
Payer: MEDICAID

## 2022-12-29 VITALS
SYSTOLIC BLOOD PRESSURE: 128 MMHG | DIASTOLIC BLOOD PRESSURE: 72 MMHG | OXYGEN SATURATION: 98 % | TEMPERATURE: 99 F | HEART RATE: 97 BPM | RESPIRATION RATE: 18 BRPM

## 2022-12-29 DIAGNOSIS — Z00.8 MEDICAL CLEARANCE FOR PSYCHIATRIC ADMISSION: Primary | ICD-10-CM

## 2022-12-29 DIAGNOSIS — Z91.148 NONCOMPLIANCE WITH MEDICATION REGIMEN: ICD-10-CM

## 2022-12-29 DIAGNOSIS — F12.10 TETRAHYDROCANNABINOL (THC) USE DISORDER, MILD, ABUSE: ICD-10-CM

## 2022-12-29 DIAGNOSIS — R45.1 AGITATION: ICD-10-CM

## 2022-12-29 DIAGNOSIS — F20.2 CATATONIC SCHIZOPHRENIA: ICD-10-CM

## 2022-12-29 LAB
ALBUMIN SERPL BCP-MCNC: 4.5 G/DL (ref 3.5–5.2)
ALP SERPL-CCNC: 60 U/L (ref 55–135)
ALT SERPL W/O P-5'-P-CCNC: 15 U/L (ref 10–44)
AMPHET+METHAMPHET UR QL: NEGATIVE
ANION GAP SERPL CALC-SCNC: 10 MMOL/L (ref 8–16)
APAP SERPL-MCNC: <3 UG/ML (ref 10–20)
AST SERPL-CCNC: 16 U/L (ref 10–40)
BACTERIA #/AREA URNS HPF: ABNORMAL /HPF
BARBITURATES UR QL SCN>200 NG/ML: NEGATIVE
BASOPHILS # BLD AUTO: 0.05 K/UL (ref 0–0.2)
BASOPHILS NFR BLD: 0.6 % (ref 0–1.9)
BENZODIAZ UR QL SCN>200 NG/ML: NEGATIVE
BILIRUB SERPL-MCNC: 0.4 MG/DL (ref 0.1–1)
BILIRUB UR QL STRIP: NEGATIVE
BUN SERPL-MCNC: 13 MG/DL (ref 6–20)
BZE UR QL SCN: NEGATIVE
CALCIUM SERPL-MCNC: 9.3 MG/DL (ref 8.7–10.5)
CANNABINOIDS UR QL SCN: ABNORMAL
CHLORIDE SERPL-SCNC: 108 MMOL/L (ref 95–110)
CLARITY UR: ABNORMAL
CO2 SERPL-SCNC: 21 MMOL/L (ref 23–29)
COLOR UR: YELLOW
CREAT SERPL-MCNC: 0.9 MG/DL (ref 0.5–1.4)
CREAT UR-MCNC: 272 MG/DL (ref 23–375)
DIFFERENTIAL METHOD: ABNORMAL
EOSINOPHIL # BLD AUTO: 0.2 K/UL (ref 0–0.5)
EOSINOPHIL NFR BLD: 1.7 % (ref 0–8)
ERYTHROCYTE [DISTWIDTH] IN BLOOD BY AUTOMATED COUNT: 12.3 % (ref 11.5–14.5)
EST. GFR  (NO RACE VARIABLE): >60 ML/MIN/1.73 M^2
ETHANOL SERPL-MCNC: <10 MG/DL
GLUCOSE SERPL-MCNC: 94 MG/DL (ref 70–110)
GLUCOSE UR QL STRIP: NEGATIVE
HCT VFR BLD AUTO: 39.5 % (ref 40–54)
HGB BLD-MCNC: 13.1 G/DL (ref 14–18)
HGB UR QL STRIP: NEGATIVE
HYALINE CASTS #/AREA URNS LPF: 0 /LPF
IMM GRANULOCYTES # BLD AUTO: 0.02 K/UL (ref 0–0.04)
IMM GRANULOCYTES NFR BLD AUTO: 0.2 % (ref 0–0.5)
KETONES UR QL STRIP: NEGATIVE
LEUKOCYTE ESTERASE UR QL STRIP: NEGATIVE
LYMPHOCYTES # BLD AUTO: 3.3 K/UL (ref 1–4.8)
LYMPHOCYTES NFR BLD: 36.4 % (ref 18–48)
MCH RBC QN AUTO: 29.4 PG (ref 27–31)
MCHC RBC AUTO-ENTMCNC: 33.2 G/DL (ref 32–36)
MCV RBC AUTO: 89 FL (ref 82–98)
METHADONE UR QL SCN>300 NG/ML: NEGATIVE
MICROSCOPIC COMMENT: ABNORMAL
MONOCYTES # BLD AUTO: 0.7 K/UL (ref 0.3–1)
MONOCYTES NFR BLD: 7.3 % (ref 4–15)
NEUTROPHILS # BLD AUTO: 4.9 K/UL (ref 1.8–7.7)
NEUTROPHILS NFR BLD: 53.8 % (ref 38–73)
NITRITE UR QL STRIP: NEGATIVE
NON-SQ EPI CELLS #/AREA URNS HPF: 0 /HPF
NRBC BLD-RTO: 0 /100 WBC
OPIATES UR QL SCN: NEGATIVE
PCP UR QL SCN>25 NG/ML: NEGATIVE
PH UR STRIP: 6 [PH] (ref 5–8)
PLATELET # BLD AUTO: 320 K/UL (ref 150–450)
PMV BLD AUTO: 9 FL (ref 9.2–12.9)
POTASSIUM SERPL-SCNC: 4 MMOL/L (ref 3.5–5.1)
PROT SERPL-MCNC: 7.4 G/DL (ref 6–8.4)
PROT UR QL STRIP: ABNORMAL
RBC # BLD AUTO: 4.45 M/UL (ref 4.6–6.2)
RBC #/AREA URNS HPF: 4 /HPF (ref 0–4)
SARS-COV-2 RDRP RESP QL NAA+PROBE: NEGATIVE
SODIUM SERPL-SCNC: 139 MMOL/L (ref 136–145)
SP GR UR STRIP: >1.03 (ref 1–1.03)
TOXICOLOGY INFORMATION: ABNORMAL
TSH SERPL DL<=0.005 MIU/L-ACNC: 0.84 UIU/ML (ref 0.4–4)
URN SPEC COLLECT METH UR: ABNORMAL
UROBILINOGEN UR STRIP-ACNC: ABNORMAL EU/DL
WBC # BLD AUTO: 9.06 K/UL (ref 3.9–12.7)
WBC #/AREA URNS HPF: 5 /HPF (ref 0–5)

## 2022-12-29 PROCEDURE — 99285 EMERGENCY DEPT VISIT HI MDM: CPT

## 2022-12-29 PROCEDURE — 25000003 PHARM REV CODE 250: Performed by: EMERGENCY MEDICINE

## 2022-12-29 PROCEDURE — 80307 DRUG TEST PRSMV CHEM ANLYZR: CPT | Performed by: EMERGENCY MEDICINE

## 2022-12-29 PROCEDURE — 85025 COMPLETE CBC W/AUTO DIFF WBC: CPT | Performed by: EMERGENCY MEDICINE

## 2022-12-29 PROCEDURE — U0002 COVID-19 LAB TEST NON-CDC: HCPCS | Performed by: EMERGENCY MEDICINE

## 2022-12-29 PROCEDURE — 80143 DRUG ASSAY ACETAMINOPHEN: CPT | Performed by: EMERGENCY MEDICINE

## 2022-12-29 PROCEDURE — 81000 URINALYSIS NONAUTO W/SCOPE: CPT | Mod: 59 | Performed by: EMERGENCY MEDICINE

## 2022-12-29 PROCEDURE — 82077 ASSAY SPEC XCP UR&BREATH IA: CPT | Performed by: EMERGENCY MEDICINE

## 2022-12-29 PROCEDURE — 80053 COMPREHEN METABOLIC PANEL: CPT | Performed by: EMERGENCY MEDICINE

## 2022-12-29 PROCEDURE — 84443 ASSAY THYROID STIM HORMONE: CPT | Performed by: EMERGENCY MEDICINE

## 2022-12-29 RX ORDER — CITALOPRAM 20 MG/1
20 TABLET, FILM COATED ORAL DAILY
Status: DISCONTINUED | OUTPATIENT
Start: 2022-12-29 | End: 2022-12-29 | Stop reason: HOSPADM

## 2022-12-29 RX ORDER — LORAZEPAM 2 MG/ML
2 INJECTION INTRAMUSCULAR EVERY 6 HOURS PRN
Status: DISCONTINUED | OUTPATIENT
Start: 2022-12-29 | End: 2022-12-29 | Stop reason: HOSPADM

## 2022-12-29 RX ORDER — DIPHENHYDRAMINE HYDROCHLORIDE 50 MG/ML
50 INJECTION INTRAMUSCULAR; INTRAVENOUS EVERY 6 HOURS PRN
Status: DISCONTINUED | OUTPATIENT
Start: 2022-12-29 | End: 2022-12-29 | Stop reason: HOSPADM

## 2022-12-29 RX ORDER — HALOPERIDOL 5 MG/ML
5 INJECTION INTRAMUSCULAR EVERY 6 HOURS PRN
Status: DISCONTINUED | OUTPATIENT
Start: 2022-12-29 | End: 2022-12-29 | Stop reason: HOSPADM

## 2022-12-29 RX ORDER — AMITRIPTYLINE HYDROCHLORIDE 25 MG/1
25 TABLET, FILM COATED ORAL NIGHTLY
Status: DISCONTINUED | OUTPATIENT
Start: 2022-12-29 | End: 2022-12-29 | Stop reason: HOSPADM

## 2022-12-29 RX ADMIN — CITALOPRAM HYDROBROMIDE 20 MG: 20 TABLET ORAL at 08:12

## 2022-12-29 RX ADMIN — AMITRIPTYLINE HYDROCHLORIDE 25 MG: 25 TABLET, FILM COATED ORAL at 08:12

## 2022-12-29 NOTE — ED PROVIDER NOTES
"Encounter Date: 12/29/2022       History     Chief Complaint   Patient presents with    Psychiatric Evaluation     Pt arrived via EMS pt has hx of schizophrenia and stated he was out of meds, upon arrival to ed pt not speaking, but appears to be responding to internal stimuli      Price Godoy is a 27 y.o. male who  has a past medical history of Anxiety, Depression, History of psychiatric hospitalization, and Suicide attempt.    The patient presents to the ED due to bizarre behavior.   Patient has reported history of schizophrenia. He reportedly has been noncompliant with his medications.    On arrival, patient is poorly cooperative, non-verbal, and appears distracted. He does not provide any additional history.    Majority of history obtained via chart review.       Review of patient's allergies indicates:   Allergen Reactions    Amoxicillin      Past Medical History:   Diagnosis Date    Anxiety     Depression     History of psychiatric hospitalization     Suicide attempt      Past Surgical History:   Procedure Laterality Date    APPENDECTOMY       Family History   Problem Relation Age of Onset    Mental illness Brother     Schizophrenia Maternal Uncle     Diabetes Maternal Grandmother     Diabetes Paternal Grandmother      Social History     Tobacco Use    Smoking status: Every Day     Packs/day: 1.00     Years: 2.00     Pack years: 2.00     Types: Cigarettes    Smokeless tobacco: Never    Tobacco comments:     Pt stated that he smokes 1 pack of tobacco daily.   Substance Use Topics    Alcohol use: Yes     Alcohol/week: 1.0 standard drink     Types: 1 Cans of beer per week     Comment: rarely    Drug use: Yes     Types: Marijuana, "Crack" cocaine     Comment: last use tonight     Review of Systems   Unable to perform ROS: Mental status change     Physical Exam     Initial Vitals [12/29/22 1854]   BP Pulse Resp Temp SpO2   (!) 155/76 91 18 98.1 °F (36.7 °C) 97 %      MAP       --         Physical " Exam    Nursing note and vitals reviewed.  Constitutional: He appears well-developed and well-nourished. He is not diaphoretic. No distress.   Calm, flat affect, nonverbal.    HENT:   Head: Normocephalic and atraumatic.   Mouth/Throat: Oropharynx is clear and moist.   Eyes: EOM are normal. Pupils are equal, round, and reactive to light.   Neck: No tracheal deviation present.   Cardiovascular:  Normal rate, regular rhythm, normal heart sounds and intact distal pulses.           Pulmonary/Chest: Breath sounds normal. No stridor. No respiratory distress.   Abdominal: Abdomen is soft. He exhibits no distension and no mass. There is no abdominal tenderness.   Musculoskeletal:         General: No edema. Normal range of motion.     Neurological: He is alert. He has normal strength. No cranial nerve deficit or sensory deficit. GCS eye subscore is 4. GCS verbal subscore is 1. GCS motor subscore is 6.   Non-verbal, unable to fully assess due to limited cooperation.    Skin: Skin is warm and dry. Capillary refill takes less than 2 seconds. No rash noted.   Psychiatric: His affect is blunt. He is slowed. He is noncommunicative.   Flat affect.  He is inattentive.       ED Course   Procedures  Labs Reviewed   CBC W/ AUTO DIFFERENTIAL - Abnormal; Notable for the following components:       Result Value    RBC 4.45 (*)     Hemoglobin 13.1 (*)     Hematocrit 39.5 (*)     MPV 9.0 (*)     All other components within normal limits   COMPREHENSIVE METABOLIC PANEL - Abnormal; Notable for the following components:    CO2 21 (*)     All other components within normal limits   URINALYSIS, REFLEX TO URINE CULTURE - Abnormal; Notable for the following components:    Appearance, UA Hazy (*)     Specific Gravity, UA >1.030 (*)     Protein, UA 2+ (*)     Urobilinogen, UA 2.0-3.0 (*)     All other components within normal limits    Narrative:     Specimen Source->Urine   DRUG SCREEN PANEL, URINE EMERGENCY - Abnormal; Notable for the following  components:    THC Presumptive Positive (*)     All other components within normal limits    Narrative:     Specimen Source->Urine   ACETAMINOPHEN LEVEL - Abnormal; Notable for the following components:    Acetaminophen (Tylenol), Serum <3.0 (*)     All other components within normal limits   URINALYSIS MICROSCOPIC - Abnormal; Notable for the following components:    Bacteria Many (*)     All other components within normal limits    Narrative:     Specimen Source->Urine   TSH   ALCOHOL,MEDICAL (ETHANOL)   SARS-COV-2 RNA AMPLIFICATION, QUAL          Imaging Results    None          Medications   haloperidol lactate injection 5 mg (has no administration in time range)   LORazepam injection 2 mg (has no administration in time range)   diphenhydrAMINE injection 50 mg (has no administration in time range)   amitriptyline tablet 25 mg (25 mg Oral Given 12/29/22 2027)   citalopram tablet 20 mg (20 mg Oral Given 12/29/22 2027)     Medical Decision Making:   History:   Old Medical Records: I decided to obtain old medical records.  Old Records Summarized: records from clinic visits and other records.       <> Summary of Records: History of schizoaffective disorder, multiple prior ED visits and Psych admissions for suicidal/homicidal ideations, psychosis. \  Admitted to Psych facility 06/2022.   Initial Assessment:   Patient is a 27 y.o. male presenting to ED with bizarre behavior.  On arrival, vitals reassuring, exam benign.  Due to concern for decompensated psychiatric disease, I feel patient is gravely disabled and a potential danger to self and others.  Will place under PEC, obtain medical screening labs, and anticipate transfer to Psych facility when medically cleared.      Differential Diagnosis:   Differential Diagnosis includes, but is not limited to:  Decompensated psychiatric disease (schizophrenia, bipolar disorder, major depression), excited delirium, medication noncompliance, substance abuse/withdrawal,  intentional drug overdose, medication toxicity, APAP/ASA overdose, acute stress reaction, personality disorder, malingering, metabolic derangement     Clinical Tests:   Lab Tests: Ordered and Reviewed  The following lab test(s) were unremarkable: CBC, CMP and Urinalysis       <> Summary of Lab: Negative tylenol, TSH, ETOH.   ED Management:  After complete evaluation, including thorough history and physical exam, the patient's symptoms are most likely due to decompensated psychiatric illness. There are no features of history or physical exam indicative of acute medical illness. Vital signs have been stable throughout ED course. The patient has similar history of psychiatric illness, and is currently on psychiatric medication, though with recent reported noncompliance.    Due to patient's presentation, history, and current condition, a PEC was completed for the safety of the patient and medical staff during evaluation and management.  One-to-one observation was initiated.     Labs were obtained, unremarkable.  The patient is currently medically cleared for psychiatric evaluation and transfer to inpatient psychiatric facility as necessary.        On re-evaluation, the patient's status has remained stable.  At this time, I believe the patient should be transferred to Psych facility for further evaluation and management of decompensated schizophrenia.                  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

## 2022-12-30 NOTE — ED NOTES
Care hand off to HOMERO Ash. Pt. Is calm and ambulated to bathroom without difficulty. Pt. Collected urine sample as instructed. Pt. Is speaking now and asking for phone to call his mother. Sitter assisting pt. To call mother.

## 2022-12-30 NOTE — ED NOTES
Pt. Asked for his prescribed medications. He said he would like to have them to make him feel better. Pt. Was compliant by allowing his blood to be drawn and changed his clothing. Pt. Was able to eat 100% of his food and is resting comfortably. Sitter at door for safety.

## 2022-12-30 NOTE — ED NOTES
Pt. Is on wall stretcher currently. Arrived via EMS from home. Per EMS father reports pt. Is schizophrenic and off of his medications. He refused to speak to medics. On arrival he still refuses to answer questions but makes appropriate eye contact and facial expressions to indicate that he understands. He occasionally makes jerky/labile movements and laughs out loud very loudly.  He nods to indicate he does not want to eat but does want blanket and pillow. Discussed plan of care with patient. He voluntarily ambulated to room 13.

## 2022-12-30 NOTE — ED NOTES
Pt. Is refusing to change into hospital scrubs. He refuses to speak and nods appropriately to indicate he is not going to cooperatve to requests and folds his arms. MD updated, awaiting medication orders.

## 2023-02-16 ENCOUNTER — HOSPITAL ENCOUNTER (EMERGENCY)
Facility: HOSPITAL | Age: 28
Discharge: HOME | End: 2023-02-17
Attending: EMERGENCY MEDICINE
Payer: MEDICAID

## 2023-02-16 DIAGNOSIS — Z78.9 UNABLE TO CARE FOR SELF: ICD-10-CM

## 2023-02-16 DIAGNOSIS — R41.843: Primary | ICD-10-CM

## 2023-02-16 LAB
BASOPHILS # BLD AUTO: 0.03 K/UL (ref 0–0.2)
BASOPHILS NFR BLD: 0.3 % (ref 0–1.9)
DIFFERENTIAL METHOD: ABNORMAL
EOSINOPHIL # BLD AUTO: 0.2 K/UL (ref 0–0.5)
EOSINOPHIL NFR BLD: 1.6 % (ref 0–8)
ERYTHROCYTE [DISTWIDTH] IN BLOOD BY AUTOMATED COUNT: 11.9 % (ref 11.5–14.5)
HCT VFR BLD AUTO: 37 % (ref 40–54)
HGB BLD-MCNC: 12.3 G/DL (ref 14–18)
IMM GRANULOCYTES # BLD AUTO: 0.04 K/UL (ref 0–0.04)
IMM GRANULOCYTES NFR BLD AUTO: 0.4 % (ref 0–0.5)
LYMPHOCYTES # BLD AUTO: 2.9 K/UL (ref 1–4.8)
LYMPHOCYTES NFR BLD: 30.9 % (ref 18–48)
MCH RBC QN AUTO: 28.9 PG (ref 27–31)
MCHC RBC AUTO-ENTMCNC: 33.2 G/DL (ref 32–36)
MCV RBC AUTO: 87 FL (ref 82–98)
MONOCYTES # BLD AUTO: 0.6 K/UL (ref 0.3–1)
MONOCYTES NFR BLD: 6.2 % (ref 4–15)
NEUTROPHILS # BLD AUTO: 5.7 K/UL (ref 1.8–7.7)
NEUTROPHILS NFR BLD: 60.6 % (ref 38–73)
NRBC BLD-RTO: 0 /100 WBC
PLATELET # BLD AUTO: 329 K/UL (ref 150–450)
PMV BLD AUTO: 9 FL (ref 9.2–12.9)
RBC # BLD AUTO: 4.25 M/UL (ref 4.6–6.2)
WBC # BLD AUTO: 9.41 K/UL (ref 3.9–12.7)

## 2023-02-16 PROCEDURE — 85025 COMPLETE CBC W/AUTO DIFF WBC: CPT | Performed by: EMERGENCY MEDICINE

## 2023-02-16 PROCEDURE — 80053 COMPREHEN METABOLIC PANEL: CPT | Performed by: EMERGENCY MEDICINE

## 2023-02-16 PROCEDURE — 82077 ASSAY SPEC XCP UR&BREATH IA: CPT | Performed by: EMERGENCY MEDICINE

## 2023-02-16 PROCEDURE — U0002 COVID-19 LAB TEST NON-CDC: HCPCS | Performed by: EMERGENCY MEDICINE

## 2023-02-16 PROCEDURE — 99285 EMERGENCY DEPT VISIT HI MDM: CPT

## 2023-02-16 PROCEDURE — 80143 DRUG ASSAY ACETAMINOPHEN: CPT | Performed by: EMERGENCY MEDICINE

## 2023-02-16 PROCEDURE — 84443 ASSAY THYROID STIM HORMONE: CPT | Performed by: EMERGENCY MEDICINE

## 2023-02-17 ENCOUNTER — HOSPITAL ENCOUNTER (INPATIENT)
Facility: HOSPITAL | Age: 28
LOS: 11 days | Discharge: HOME | DRG: 885 | End: 2023-02-28
Attending: STUDENT IN AN ORGANIZED HEALTH CARE EDUCATION/TRAINING PROGRAM | Admitting: STUDENT IN AN ORGANIZED HEALTH CARE EDUCATION/TRAINING PROGRAM
Payer: MEDICAID

## 2023-02-17 VITALS
RESPIRATION RATE: 20 BRPM | HEART RATE: 80 BPM | HEIGHT: 71 IN | OXYGEN SATURATION: 99 % | SYSTOLIC BLOOD PRESSURE: 136 MMHG | WEIGHT: 275 LBS | DIASTOLIC BLOOD PRESSURE: 63 MMHG | TEMPERATURE: 98 F | BODY MASS INDEX: 38.5 KG/M2

## 2023-02-17 DIAGNOSIS — F25.1 SCHIZOAFFECTIVE DISORDER, DEPRESSIVE TYPE: Primary | ICD-10-CM

## 2023-02-17 DIAGNOSIS — F29 PSYCHOSIS: ICD-10-CM

## 2023-02-17 LAB
ALBUMIN SERPL BCP-MCNC: 4.3 G/DL (ref 3.5–5.2)
ALP SERPL-CCNC: 68 U/L (ref 55–135)
ALT SERPL W/O P-5'-P-CCNC: 12 U/L (ref 10–44)
AMPHET+METHAMPHET UR QL: NEGATIVE
ANION GAP SERPL CALC-SCNC: 10 MMOL/L (ref 8–16)
APAP SERPL-MCNC: <3 UG/ML (ref 10–20)
AST SERPL-CCNC: 13 U/L (ref 10–40)
BARBITURATES UR QL SCN>200 NG/ML: NEGATIVE
BENZODIAZ UR QL SCN>200 NG/ML: NEGATIVE
BILIRUB SERPL-MCNC: 0.5 MG/DL (ref 0.1–1)
BILIRUB UR QL STRIP: NEGATIVE
BUN SERPL-MCNC: 11 MG/DL (ref 6–20)
BZE UR QL SCN: NEGATIVE
CALCIUM SERPL-MCNC: 8.9 MG/DL (ref 8.7–10.5)
CANNABINOIDS UR QL SCN: NEGATIVE
CHLORIDE SERPL-SCNC: 107 MMOL/L (ref 95–110)
CLARITY UR: CLEAR
CO2 SERPL-SCNC: 22 MMOL/L (ref 23–29)
COLOR UR: YELLOW
CREAT SERPL-MCNC: 0.9 MG/DL (ref 0.5–1.4)
CREAT UR-MCNC: 246.8 MG/DL (ref 23–375)
EST. GFR  (NO RACE VARIABLE): >60 ML/MIN/1.73 M^2
ETHANOL SERPL-MCNC: <10 MG/DL
GLUCOSE SERPL-MCNC: 96 MG/DL (ref 70–110)
GLUCOSE UR QL STRIP: NEGATIVE
HGB UR QL STRIP: NEGATIVE
KETONES UR QL STRIP: NEGATIVE
LEUKOCYTE ESTERASE UR QL STRIP: NEGATIVE
METHADONE UR QL SCN>300 NG/ML: NEGATIVE
NITRITE UR QL STRIP: NEGATIVE
OPIATES UR QL SCN: NEGATIVE
PCP UR QL SCN>25 NG/ML: NEGATIVE
PH UR STRIP: 6 [PH] (ref 5–8)
POTASSIUM SERPL-SCNC: 3.8 MMOL/L (ref 3.5–5.1)
PROT SERPL-MCNC: 7.4 G/DL (ref 6–8.4)
PROT UR QL STRIP: NEGATIVE
SARS-COV-2 RDRP RESP QL NAA+PROBE: NEGATIVE
SODIUM SERPL-SCNC: 139 MMOL/L (ref 136–145)
SP GR UR STRIP: 1.02 (ref 1–1.03)
TOXICOLOGY INFORMATION: NORMAL
TSH SERPL DL<=0.005 MIU/L-ACNC: 1.63 UIU/ML (ref 0.4–4)
URN SPEC COLLECT METH UR: NORMAL
UROBILINOGEN UR STRIP-ACNC: NEGATIVE EU/DL

## 2023-02-17 PROCEDURE — 99215 OFFICE O/P EST HI 40 MIN: CPT | Mod: 95,,, | Performed by: PSYCHIATRY & NEUROLOGY

## 2023-02-17 PROCEDURE — 25000003 PHARM REV CODE 250: Performed by: PSYCHIATRY & NEUROLOGY

## 2023-02-17 PROCEDURE — 80307 DRUG TEST PRSMV CHEM ANLYZR: CPT | Performed by: EMERGENCY MEDICINE

## 2023-02-17 PROCEDURE — 99223 PR INITIAL HOSPITAL CARE,LEVL III: ICD-10-PCS | Mod: ,,, | Performed by: PSYCHIATRY & NEUROLOGY

## 2023-02-17 PROCEDURE — 90833 PSYTX W PT W E/M 30 MIN: CPT | Mod: ,,, | Performed by: PSYCHIATRY & NEUROLOGY

## 2023-02-17 PROCEDURE — 99215 PR OFFICE/OUTPT VISIT, EST, LEVL V, 40-54 MIN: ICD-10-PCS | Mod: 95,,, | Performed by: PSYCHIATRY & NEUROLOGY

## 2023-02-17 PROCEDURE — 99221 1ST HOSP IP/OBS SF/LOW 40: CPT | Mod: ,,, | Performed by: NURSE PRACTITIONER

## 2023-02-17 PROCEDURE — 99223 1ST HOSP IP/OBS HIGH 75: CPT | Mod: ,,, | Performed by: PSYCHIATRY & NEUROLOGY

## 2023-02-17 PROCEDURE — 11400000 HC PSYCH PRIVATE ROOM

## 2023-02-17 PROCEDURE — 25000003 PHARM REV CODE 250: Performed by: STUDENT IN AN ORGANIZED HEALTH CARE EDUCATION/TRAINING PROGRAM

## 2023-02-17 PROCEDURE — 90833 PR PSYCHOTHERAPY W/PATIENT W/E&M, 30 MIN (ADD ON): ICD-10-PCS | Mod: ,,, | Performed by: PSYCHIATRY & NEUROLOGY

## 2023-02-17 PROCEDURE — 99221 PR INITIAL HOSPITAL CARE,LEVL I: ICD-10-PCS | Mod: ,,, | Performed by: NURSE PRACTITIONER

## 2023-02-17 PROCEDURE — 81003 URINALYSIS AUTO W/O SCOPE: CPT | Mod: 59 | Performed by: EMERGENCY MEDICINE

## 2023-02-17 RX ORDER — MUPIROCIN 20 MG/G
OINTMENT TOPICAL 2 TIMES DAILY
Status: ACTIVE | OUTPATIENT
Start: 2023-02-17 | End: 2023-02-22

## 2023-02-17 RX ORDER — OLANZAPINE 10 MG/1
10 TABLET ORAL EVERY 6 HOURS PRN
Status: DISCONTINUED | OUTPATIENT
Start: 2023-02-17 | End: 2023-02-28 | Stop reason: HOSPADM

## 2023-02-17 RX ORDER — FOLIC ACID 1 MG/1
1 TABLET ORAL DAILY
Status: DISCONTINUED | OUTPATIENT
Start: 2023-02-17 | End: 2023-02-28 | Stop reason: HOSPADM

## 2023-02-17 RX ORDER — OLANZAPINE 10 MG/2ML
10 INJECTION, POWDER, FOR SOLUTION INTRAMUSCULAR EVERY 6 HOURS PRN
Status: DISCONTINUED | OUTPATIENT
Start: 2023-02-17 | End: 2023-02-28 | Stop reason: HOSPADM

## 2023-02-17 RX ORDER — PROMETHAZINE HYDROCHLORIDE 25 MG/1
25 TABLET ORAL EVERY 6 HOURS PRN
Status: DISCONTINUED | OUTPATIENT
Start: 2023-02-17 | End: 2023-02-28 | Stop reason: HOSPADM

## 2023-02-17 RX ORDER — BUPROPION HYDROCHLORIDE 150 MG/1
150 TABLET ORAL DAILY
Status: DISCONTINUED | OUTPATIENT
Start: 2023-02-17 | End: 2023-02-20

## 2023-02-17 RX ORDER — RISPERIDONE 0.5 MG/1
0.5 TABLET ORAL 2 TIMES DAILY
Status: DISCONTINUED | OUTPATIENT
Start: 2023-02-17 | End: 2023-02-19

## 2023-02-17 RX ORDER — IBUPROFEN 200 MG
1 TABLET ORAL DAILY
Status: DISCONTINUED | OUTPATIENT
Start: 2023-02-17 | End: 2023-02-28 | Stop reason: HOSPADM

## 2023-02-17 RX ORDER — HYDROXYZINE HYDROCHLORIDE 25 MG/1
50 TABLET, FILM COATED ORAL EVERY 6 HOURS PRN
Status: DISCONTINUED | OUTPATIENT
Start: 2023-02-17 | End: 2023-02-28 | Stop reason: HOSPADM

## 2023-02-17 RX ORDER — ONDANSETRON 4 MG/1
4 TABLET, ORALLY DISINTEGRATING ORAL EVERY 8 HOURS PRN
Status: DISCONTINUED | OUTPATIENT
Start: 2023-02-17 | End: 2023-02-28 | Stop reason: HOSPADM

## 2023-02-17 RX ADMIN — RISPERIDONE 0.5 MG: 0.5 TABLET ORAL at 02:02

## 2023-02-17 RX ADMIN — THERA TABS 1 TABLET: TAB at 12:02

## 2023-02-17 RX ADMIN — FOLIC ACID 1 MG: 1 TABLET ORAL at 12:02

## 2023-02-17 RX ADMIN — RISPERIDONE 0.5 MG: 0.5 TABLET ORAL at 08:02

## 2023-02-17 RX ADMIN — BUPROPION HYDROCHLORIDE 150 MG: 150 TABLET, EXTENDED RELEASE ORAL at 02:02

## 2023-02-17 NOTE — ED PROVIDER NOTES
"Encounter Date: 2/16/2023    SCRIBE #1 NOTE: I, Mega White, am scribing for, and in the presence of,  Umer Meadows MD. I have scribed the following portions of the note - Other sections scribed: HPI, ROS, PE.     History     Chief Complaint   Patient presents with    Psychiatric Evaluation     Pt arrived via ems. Pt chief complaint is a psych eval. Per ems pt was found wandering streets found a  and stated needed an ambulance. Pt states he is schizophrenic and has been off meds.      Price Godoy is a 28 y.o male with a PMHx of anxiety, and depression, who presents to the ED via EMS for psychiatric evaluation tonight. History provided by an independent historian, EMS, who reports the patient was found wandering the streets by a  and requested an ambulance. Patient states he "is on the wrong medications, and wants medications to sleep more." Patient reports he sleeps 8 hours a day. Patient further notes he "can't be a man, can't talk sometimes, like having trouble getting his thoughts out." Patient further notes "his thinking is slow," but he is not depressed. Patient states his medications were changed recently. Patient reports nausea. No medications taken PTA. No alleviating or exacerbating factors noted. Denies headache, visual and auditory hallucinations, fever, or other associated symptoms. Patient reports he is currently homeless. Denies EtOH, or other recreational drug usage. Allergic to amoxicillin.     The history is provided by the patient. No  was used.   Review of patient's allergies indicates:   Allergen Reactions    Amoxicillin      Past Medical History:   Diagnosis Date    Anxiety     Depression     History of psychiatric hospitalization     Suicide attempt      Past Surgical History:   Procedure Laterality Date    APPENDECTOMY       Family History   Problem Relation Age of Onset    Mental illness Brother     Schizophrenia Maternal Uncle     Diabetes " "Maternal Grandmother     Diabetes Paternal Grandmother      Social History     Tobacco Use    Smoking status: Every Day     Packs/day: 1.00     Years: 2.00     Pack years: 2.00     Types: Cigarettes    Smokeless tobacco: Never    Tobacco comments:     Pt stated that he smokes 1 pack of tobacco daily.   Substance Use Topics    Alcohol use: Yes     Alcohol/week: 1.0 standard drink     Types: 1 Cans of beer per week     Comment: rarely    Drug use: Yes     Types: Marijuana, "Crack" cocaine     Comment: last use tonight     Review of Systems   Constitutional:  Negative for chills and fever.   HENT:  Negative for congestion.    Respiratory:  Negative for cough and shortness of breath.    Gastrointestinal:  Positive for nausea. Negative for abdominal pain and diarrhea.   Genitourinary:  Negative for dysuria.   Musculoskeletal:  Negative for back pain.   Skin:  Negative for rash.   Neurological:  Negative for headaches.   Psychiatric/Behavioral:  Positive for confusion. Negative for hallucinations, sleep disturbance and suicidal ideas.      Physical Exam     Initial Vitals [02/16/23 2318]   BP Pulse Resp Temp SpO2   127/80 82 16 98.6 °F (37 °C) 99 %      MAP       --         Physical Exam    Nursing note and vitals reviewed.  Constitutional: He appears well-developed. He is not diaphoretic. No distress.   HENT:   Head: Normocephalic.   Eyes: EOM are normal. Right eye exhibits no nystagmus. Left eye exhibits no nystagmus.   Cardiovascular:  Normal rate and regular rhythm.           No murmur heard.  Pulmonary/Chest: Effort normal and breath sounds normal. He has no wheezes.   Abdominal: Abdomen is soft. He exhibits no distension. There is no abdominal tenderness.   Musculoskeletal:         General: Normal range of motion.     Neurological: He is alert.   Slow to respond but answering appropriately.    Skin: Skin is warm.       ED Course   Procedures  Labs Reviewed   CBC W/ AUTO DIFFERENTIAL - Abnormal; Notable for the " following components:       Result Value    RBC 4.25 (*)     Hemoglobin 12.3 (*)     Hematocrit 37.0 (*)     MPV 9.0 (*)     All other components within normal limits   COMPREHENSIVE METABOLIC PANEL - Abnormal; Notable for the following components:    CO2 22 (*)     All other components within normal limits   ACETAMINOPHEN LEVEL - Abnormal; Notable for the following components:    Acetaminophen (Tylenol), Serum <3.0 (*)     All other components within normal limits   TSH   ALCOHOL,MEDICAL (ETHANOL)   SARS-COV-2 RNA AMPLIFICATION, QUAL   URINALYSIS, REFLEX TO URINE CULTURE   DRUG SCREEN PANEL, URINE EMERGENCY          Imaging Results    None          Medications - No data to display  Medical Decision Making:   History:   Old Medical Records: I decided to obtain old medical records.  Initial Assessment:     28-year-old male history of schizophrenia presenting for help with what appears to be psychomotor delay.  Patient reports being compliant medications but asking for help and changes in medications as he feels they are not working.  Psychiatry consult who feels the patient would best be housed in inpatient psychiatry.  Patient otherwise medically clear at this time.  Patient denies any thoughts of harm to himself or others at this time.  Differential Diagnosis:     Depression episode,catatonic episode  Clinical Tests:   Lab Tests: Ordered and Reviewed  ED Management:    Problems considered includes psychomotor delay (Signs & symptoms, differentials)  Chronic problems impacting care includes schizophrenia.  Plan was discussed with the patient.  This includes plan for PEC for inpatient psychiatry evaluation.    All questions or concerns have been addressed.         Scribe Attestation:   Scribe #1: I performed the above scribed service and the documentation accurately describes the services I performed. I attest to the accuracy of the note.                   Clinical Impression:   Final diagnoses:  [R40.843]  Psychomotor delay (Primary)  [Z78.9] Unable to care for self        ED Disposition Condition    Transfer to Psych Facility Stable          ED Prescriptions    None       Follow-up Information    None         I, Umer Meadows, personally performed the services described in this documentation. All medical record entries made by the scribe were at my direction and in my presence. I have reviewed the chart and agree that the record reflects my personal performance and is accurate and complete.       Umer Meadows MD  02/17/23 0343

## 2023-02-17 NOTE — NURSING
Pt accepted by Dr Howard to [facility].  [place] at [facility] said pt available and will be coming to [facility].

## 2023-02-17 NOTE — CONSULTS
"[facility] Medicine  Consult Note    Patient Name: Price Godoy  MRN: [id number]  Admission Date: 2/17/2023  Hospital Length of Stay: 0 days  Attending Physician: Leroy Howard III, MD   Primary Care Provider: Leroy Howard III, MD       Co sign - Dr. Ajay Galvan     Patient information was obtained from patient and ER records.     Inpatient consult to [facility] for History and Physical  Consult performed by: Elaine Downing NP  Consult ordered by: Leroy Howard III, MD        Subjective:     Principal Problem: <principal problem not specified>    Chief Complaint: No chief complaint on file.       HPI: Price Godoy is a 28 y.o. male  with a past psychiatric history of  schizophrenia vs. Schizoaffective disorder  currently admitted to the inpatient unit with the following chief complaint:  bizarre behavior, suicidal ideation         Consulted for medical H&P  Records reviewed     PER ED notes   Price Godoy is a 28 y.o male with a PMHx of anxiety, and depression, who presents to the ED via EMS for psychiatric evaluation tonight. History provided by an independent historian, EMS, who reports the patient was found wandering the streets by a  and requested an ambulance. Patient states he "is on the wrong medications, and wants medications to sleep more." Patient reports he sleeps 8 hours a day. Patient further notes he "can't be a man, can't talk sometimes, like having trouble getting his thoughts out." Patient further notes "his thinking is slow," but he is not depressed. Patient states his medications were changed recently. Patient reports nausea. No medications taken PTA. No alleviating or exacerbating factors noted. Denies headache, visual and auditory hallucinations, fever, or other associated symptoms. Patient reports he is currently homeless. Denies EtOH, or other recreational drug usage. Allergic to amoxicillin.         Past Medical History: " "  Diagnosis Date    Anxiety     Depression     History of psychiatric hospitalization     Suicide attempt        Past Surgical History:   Procedure Laterality Date    APPENDECTOMY         Review of patient's allergies indicates:   Allergen Reactions    Amoxicillin        No current facility-administered medications on file prior to encounter.     Current Outpatient Medications on File Prior to Encounter   Medication Sig    cloNIDine (CATAPRES) 0.1 MG tablet Take 1 tablet (0.1 mg total) by mouth nightly.    FLUoxetine 40 MG capsule Take 1 capsule (40 mg total) by mouth 2 (two) times daily.    gabapentin (NEURONTIN) 300 MG capsule Take 2 capsules (600 mg total) by mouth 3 (three) times daily.    mirtazapine (REMERON) 30 MG tablet Take 1 tablet (30 mg total) by mouth every evening.    paliperidone palmitate (INVEGA SUSTENNA) 234 mg/1.5 mL Syrg injection Inject 1.5 mLs (234 mg total) into the muscle every 28 days.    QUEtiapine (SEROQUEL) 50 MG tablet Take 1 tablet (50 mg total) by mouth every evening.    risperiDONE 120 mg sers Inject 120 mg into the skin every 30 days.     Family History       Problem Relation (Age of Onset)    Diabetes Maternal Grandmother, Paternal Grandmother    Mental illness Brother    Schizophrenia Maternal Uncle          Tobacco Use    Smoking status: Every Day     Packs/day: 1.00     Years: 2.00     Pack years: 2.00     Types: Cigarettes    Smokeless tobacco: Never    Tobacco comments:     Pt stated that he smokes 1 pack of tobacco daily.   Substance and Sexual Activity    Alcohol use: Yes     Alcohol/week: 1.0 standard drink     Types: 1 Cans of beer per week     Comment: rarely    Drug use: Yes     Types: Marijuana, "Crack" cocaine     Comment: last use tonight    Sexual activity: Yes     Partners: Male     Review of Systems   Constitutional:  Negative for chills and fever.   HENT:  Negative for congestion and sore throat.    Respiratory:  Negative for cough, chest " tightness and shortness of breath.    Cardiovascular:  Negative for chest pain, palpitations and leg swelling.   Gastrointestinal:  Negative for abdominal pain, diarrhea, nausea and vomiting.   Genitourinary:  Negative for flank pain, frequency and hematuria.   Musculoskeletal:  Negative for back pain and neck pain.   Skin:  Negative for rash and wound.   Neurological:  Negative for dizziness, seizures, syncope and headaches.   Psychiatric/Behavioral:  Positive for dysphoric mood and suicidal ideas. Negative for agitation, confusion and self-injury.    Objective:     Vital Signs (Most Recent):  Temp: 97.7 °F (36.5 °C) (02/17/23 0956)  Pulse: 67 (02/17/23 0956)  Resp: 16 (02/17/23 0956)  BP: 135/74 (02/17/23 0956) Vital Signs (24h Range):  Temp:  [97.7 °F (36.5 °C)-98.6 °F (37 °C)] 97.7 °F (36.5 °C)  Pulse:  [67-82] 67  Resp:  [16-20] 16  SpO2:  [99 %-100 %] 99 %  BP: (120-136)/(63-80) 135/74     Weight: 124.2 kg (273 lb 11.2 oz)  Body mass index is 38.17 kg/m².    Physical Exam  Vitals and nursing note reviewed.   Constitutional:       General: He is not in acute distress.     Appearance: He is well-developed.   HENT:      Head: Normocephalic and atraumatic.   Eyes:      Pupils: Pupils are equal, round, and reactive to light.   Neck:      Thyroid: No thyromegaly.   Cardiovascular:      Rate and Rhythm: Normal rate and regular rhythm.      Heart sounds: Normal heart sounds. No murmur heard.  Pulmonary:      Effort: Pulmonary effort is normal. No respiratory distress.      Breath sounds: Normal breath sounds. No wheezing or rales.   Abdominal:      General: Bowel sounds are normal. There is no distension.      Palpations: Abdomen is soft. There is no mass.      Tenderness: There is no abdominal tenderness.   Musculoskeletal:         General: No deformity. Normal range of motion.   Lymphadenopathy:      Cervical: No cervical adenopathy.   Skin:     General: Skin is warm and dry.      Findings: No erythema or rash.    Neurological:      Mental Status: He is alert and oriented to person, place, and time.      Comments: CN II visual fields full to confrontation  CN III, Iv, VI- pupils ERRL   CN III- no palsy  Nystagmus; none   Diplopia- none  ophthalmoparesis- none  CN V- facial sensation intact  CN VII- facial expression full and symmetric  CNVIII- normal  CN IV-Normal  CN X- normal  CN XI- Normal   CN XII normal          Significant Labs: All pertinent labs within the past 24 hours have been reviewed.  Urine Culture: No results for input(s): LABURIN in the last 48 hours.  Urine Studies:   Recent Labs   Lab 02/17/23  0329   COLORU Yellow   APPEARANCEUA Clear   PHUR 6.0   SPECGRAV 1.025   PROTEINUA Negative   GLUCUA Negative   KETONESU Negative   BILIRUBINUA Negative   OCCULTUA Negative   NITRITE Negative   UROBILINOGEN Negative   LEUKOCYTESUR Negative     UPT  No results found for this or any previous visit.  U/A    UDS  Results for orders placed or performed during the hospital encounter of 02/16/23   Drug screen panel, emergency   Result Value Ref Range    Benzodiazepines Negative Negative    Methadone metabolites Negative Negative    Cocaine (Metab.) Negative Negative    Opiate Scrn, Ur Negative Negative    Barbiturate Screen, Ur Negative Negative    Amphetamine Screen, Ur Negative Negative    THC Negative Negative    Phencyclidine Negative Negative    Creatinine, Urine 246.8 23.0 - 375.0 mg/dL    Toxicology Information SEE COMMENT      CBC  Results for orders placed or performed during the hospital encounter of 02/16/23   CBC auto differential   Result Value Ref Range    WBC 9.41 3.90 - 12.70 K/uL    RBC 4.25 (L) 4.60 - 6.20 M/uL    Hemoglobin 12.3 (L) 14.0 - 18.0 g/dL    Hematocrit 37.0 (L) 40.0 - 54.0 %    MCV 87 82 - 98 fL    MCH 28.9 27.0 - 31.0 pg    MCHC 33.2 32.0 - 36.0 g/dL    RDW 11.9 11.5 - 14.5 %    Platelets 329 150 - 450 K/uL    MPV 9.0 (L) 9.2 - 12.9 fL    Immature Granulocytes 0.4 0.0 - 0.5 %    Gran # (ANC)  5.7 1.8 - 7.7 K/uL    Immature Grans (Abs) 0.04 0.00 - 0.04 K/uL    Lymph # 2.9 1.0 - 4.8 K/uL    Mono # 0.6 0.3 - 1.0 K/uL    Eos # 0.2 0.0 - 0.5 K/uL    Baso # 0.03 0.00 - 0.20 K/uL    nRBC 0 0 /100 WBC    Gran % 60.6 38.0 - 73.0 %    Lymph % 30.9 18.0 - 48.0 %    Mono % 6.2 4.0 - 15.0 %    Eosinophil % 1.6 0.0 - 8.0 %    Basophil % 0.3 0.0 - 1.9 %    Differential Method Automated      CMP  Results for orders placed or performed during the hospital encounter of 02/16/23   Comprehensive metabolic panel   Result Value Ref Range    Sodium 139 136 - 145 mmol/L    Potassium 3.8 3.5 - 5.1 mmol/L    Chloride 107 95 - 110 mmol/L    CO2 22 (L) 23 - 29 mmol/L    Glucose 96 70 - 110 mg/dL    BUN 11 6 - 20 mg/dL    Creatinine 0.9 0.5 - 1.4 mg/dL    Calcium 8.9 8.7 - 10.5 mg/dL    Total Protein 7.4 6.0 - 8.4 g/dL    Albumin 4.3 3.5 - 5.2 g/dL    Total Bilirubin 0.5 0.1 - 1.0 mg/dL    Alkaline Phosphatase 68 55 - 135 U/L    AST 13 10 - 40 U/L    ALT 12 10 - 44 U/L    Anion Gap 10 8 - 16 mmol/L    eGFR >60 >60 mL/min/1.73 m^2     TSH  Results for orders placed or performed during the hospital encounter of 02/16/23   TSH   Result Value Ref Range    TSH 1.628 0.400 - 4.000 uIU/mL     ETOH  Results for orders placed or performed during the hospital encounter of 02/16/23   Ethanol   Result Value Ref Range    Alcohol, Serum <10 <10 mg/dL     Salicylate  Results for orders placed or performed during the hospital encounter of 07/25/19   Salicylate level   Result Value Ref Range    Salicylate Lvl <5.0 (L) 15.0 - 30.0 mg/dL     Acetaminophen  Results for orders placed or performed during the hospital encounter of 02/16/23   Acetaminophen level   Result Value Ref Range    Acetaminophen (Tylenol), Serum <3.0 (L) 10.0 - 20.0 ug/mL           Significant Imaging: I have reviewed and interpreted all pertinent imaging results/findings within the past 24 hours.    None     Assessment/Plan:     Schizoaffective disorder, depressive type  Per  psychiatry         VTE Risk Mitigation (From admission, onward)    None              Thank you for your consult. I will sign off. Please contact us if you have any additional questions.    Elaine Downing NP  Department of Hospital Medicine   [facility]

## 2023-02-17 NOTE — H&P
"PSYCHIATRY INPATIENT ADMISSION NOTE - H & P      2/17/2023 1:42 PM   Price Godoy   1995   [id number]         DATE OF ADMISSION: 2/17/2023  9:10 AM    SITE: [facility]    CURRENT LEGAL STATUS: PEC and/or CEC      HISTORY    CHIEF COMPLAINT   Price Godoy is a 28 y.o. male with a past psychiatric history schizophrenia vs. Schizoaffective disorder  currently admitted to the inpatient unit with the following chief complaint:  bizarre behavior, suicidal ideation, "I need help socially."     HPI   The patient was seen and examined. The chart was reviewed.    The patient presented to the ER on 2/17/2023 . Per staff notes:  -Pt to ED via EMS for wandering the street and needing an ambulance. Pt has flat affect. States " when do I get to leave? I was just wandering". Pt denies SI or HI. Pt calm and cooperative-   Pt arrived via ems. Pt chief complaint is a psych eval. Per ems pt was found wandering streets found a  and stated needed an ambulance. Pt states he is schizophrenic and has been off meds   -Price Godoy is a 28 y.o male with a PMHx of anxiety, and depression, who presents to the ED via EMS for psychiatric evaluation tonight. History provided by an independent historian, EMS, who reports the patient was found wandering the streets by a  and requested an ambulance. Patient states he "is on the wrong medications, and wants medications to sleep more." Patient reports he sleeps 8 hours a day. Patient further notes he "can't be a man, can't talk sometimes, like having trouble getting his thoughts out." Patient further notes "his thinking is slow," but he is not depressed. Patient states his medications were changed recently. Patient reports nausea. No medications taken PTA. No alleviating or exacerbating factors noted. Denies headache, visual and auditory hallucinations, fever, or other associated symptoms. Patient reports he is currently homeless. Denies EtOH, or other " "recreational drug usage. Allergic to amoxicillin.  -Today on exam patient seen lying in bed looking disheveled.  Patient reports that I walk to the hospital patient states that he did this because I did not have any hope upon further enquiring what he needs by this patient endorses suicidal ideations increased hopelessness helplessness anhedonia and lack of motivation.  On exam patient seems to have increased thought blocking increased delayed responses to questions and some potential responding to internal stimuli.  Patient denied any acute psychotic symptoms denied any auditory visual hallucinations or paranoia patient reports that he is homeless and does not know how to get help with his mood and has been feeling suicidal about it.  Issue unable to provide any collateral information patient did report having act team however unsure if he has been following up with them regularly due to patient being homeless.  Patient also admitted missing his monthly long-acting depot shot he last received this last month during his inpatient hospitalization  -Per PEC- "appears slow and slightly catatonic. Asking for help with meds." On admit Pt states he is here "so you can get me a house, apartment, or even a studio. I never had one of those. I dont have a mom or dad." States has been off meds for a long time. Appears to be RIS, slowed thought processes, and delayed responses.Denies SI/HI/AH/VH    The patient was medically cleared and admitted to the [facility].    Provided to the medical student: Patient has an extensive history of psychiatric hospitalizations. Most recently leaving [facility] 1 month ago. Patient states, "I'm just looking for a home, just been lost for a while." Patient could not elaborate further as he was easily distracted with extensive thought blocking. He states that the abilify was working for him but would prefer daily medication instead of the long acting injection. Patient states this due to, " ""wanting freedom to feel himself sometimes." He states he doesn't have any support system, transportation, or money to support himself. He has most recently been in [facility], [place], operated by [facility]. Patient was pushed to provide more information but repeatedly gave one word answers and changed his answers numerous times.    During the assessment, the patient was a limited historian and unreliable. He denied all symptoms, then endorsed depression. He has negative symptoms of psychosis, with noted RIS. He is homeless. -he has a long history of hospitalizations and medication noncomplaince.          Symptoms of Depression: diminished mood - Yes, loss of interest/anhedonia - Yes;  recurrent - Yes, >14 days - Yes, diminished energy - Yes, change in sleep - No, change in appetite - No, diminished concentration or cognition or indecisiveness - Yes, PMA/R -  No, excessive guilt or hopelessness or worthlessness - Yes, suicidal ideations - Yes     Changes in Sleep: trouble with initiation- No, maintenance, - No early morning awakening with inability to return to sleep - No, hypersomnolence - No     Suicidal- active/passive ideations - yes, organized plans, future intentions - No     Homicidal ideations: active/passive ideations - No, organized plans, future intentions - No     Symptoms of psychosis: hallucinations - No, delusions - No, disorganized speech - No, disorganized behavior or abnormal motor behavior - No, or negative symptoms (diminshed emotional expression, avolition, anhedonia, alogia, asociality) - No, active phase symptoms >1 month - No, continuous signs of illness > 6 months - No, since onset of illness decreased level of functioning present - No  +h/o psychosis/schizophrenia per chart     Symptoms of faby or hypomania: elevated, expansive, or irritable mood with increased energy or activity - No; > 4 days - No,  >7 days - No; with inflated self-esteem or grandiosity - No, decreased need for sleep - No, increased rate of " "speech - No, FOI or racing thoughts - No, distractibility - No, increased goal directed activity or PMA - No, risky/disinhibited behavior - No     Symptoms of MAGGIE: excessive anxiety/worry/fear, more days than not, about numerous issues - yes, ongoing for >6 months - No, difficult to control - yes, with restlessness - No, fatigue - yes, poor concentration - yes, irritability - No, muscle tension - No, sleep disturbance - yes; causes functionally impairing distress - No     Symptoms of Panic Disorder: recurrent panic attacks (palpitations/heart racing, sweating, shakiness, dyspnea, choking, chest pain/discomfort, Gi symptoms, dizzy/lightheadedness, hot/col flashes, paresthesias, derealization, fear of losing control or fear of dying or fear of "going crazy") - No, precipitated - No, un-precipitated - No, source of worry and/or behavioral changes secondary for 1 month or longer- No, agoraphobia - No     Symptoms of PTSD: h/o trauma exposure - No; re-experiencing/intrusive symptoms - No, avoidant behavior - No, 2 or more negative alterations in cognition or mood - No, 2 or more hyperarousal symptoms - No; with dissociative symptoms - No, ongoing for 1 or more  months - No     Symptoms of OCD: obsessions (recurrent thoughts/urges/images; intrusive and/or unwanted; uses other thoughts/actions to suppress) - No; compulsions (repetitive behaviors used to lower distress/anxiety/obsessions) - No, time-consuming (over 1 hour per day) or cause significant distress/impairment - - No     Symptoms of Anorexia: restriction of caloric intake leading to significantly low body weight - No, intense fear of gaining weight or persistent behavior that interferes with weight gain even thought at a significantly low weight - No, disturbance in the way in which one's body weight or shape is experienced, undue influence of body weight or shape on self evaluation, or persistent lack of recognition of the seriousness of the current low body " weight - No     Symptoms of Bulimia: recurrent episodes of binge eating (definitely larger amount  than what others would eat and lack of a sense of control over eating during episode) - No, recurrent inappropriate compensatory behaviors in order to prevent weight gain (fasting, medications, exercise, vomiting) - No, binges and compensatory behaviors both occur on average at least once a week for 3 months - No, self evaluations is unduly influenced by body shape/weight- - No     Symptoms of Binge eating: recurrent episodes of binge eating (definitely larger amount than what others would eat and lack of a sense of control over eating during episode) - No, 3 or more of following (eating much more rapidly, eating until uncomfortably full, large amounts when not hungry, eating alone because of embarrassed by how much,  feeling disgusted with oneself, depressed or very guilty afterward) - No, distress regarding binges - No, binges occur on average at least once a week for 3 months - No        Substance/s:  Taken in larger amounts or over longer periods than intended: No,  Persistent desire or unsuccessful attempts to cut down or stop: No,  Great deal of time spent seeking, using or recovering from: No,  Craving or strong desire to use: No,  Recurrent use despite failure to meet major role obligation: No,  Continued use despite persistent or recurrent social/interparsonal issues due to use: No,  Important social/work/recreational activities given up due to use: No,  Recurrent use in physically hazardous situations: No,  Continued use despite knowledge of persistent physical or psychological problem: No,  Tolerance (either increased need or diminished effect): No    Psychotherapy:  Target symptoms: depression, psychosis  Why chosen therapy is appropriate versus another modality: relevant to diagnosis, patient responds to this modality, evidence based practice  Outcome monitoring methods: self-report,  observation  Therapeutic intervention type: insight oriented psychotherapy, behavior modifying psychotherapy, supportive psychotherapy, interactive psychotherapy  Topics discussed/themes: building skills sets for symptom management, symptom recognition  The patient's response to the intervention is accepting. The patient's progress toward treatment goals is limited.   Duration of intervention: 18 minutes.      PAST PSYCHIATRIC HISTORY  Previous Psychiatric Hospitalizations: Yes  Previous SI/HI: Yes,  Previous Suicide Attempts: No,   Previous Medication Trials: Yes,  Psychiatric Care (current & past): No,  History of Psychotherapy: Yes,  History of Violence: No,  History of sexual/physical abuse: No,    PAST MEDICAL & SURGICAL HISTORY   Past Medical History:   Diagnosis Date    Anxiety     Depression     History of psychiatric hospitalization     Suicide attempt      Past Surgical History:   Procedure Laterality Date    APPENDECTOMY           CURRENT PSYCH MEDICATION REGIMEN   Wellbutrin (?)  Current Medication side effects:  no  Current Medication compliance:  no    Previous psych meds trials  Wellbutrin, risperdal, and others (see below)    Home Meds:   Prior to Admission medications    Medication Sig Start Date End Date Taking? Authorizing Provider   cloNIDine (CATAPRES) 0.1 MG tablet Take 1 tablet (0.1 mg total) by mouth nightly. 3/29/21 4/28/21  Hortensia Alston NP   FLUoxetine 40 MG capsule Take 1 capsule (40 mg total) by mouth 2 (two) times daily. 3/29/21 4/28/21  Hortensia Alston NP   gabapentin (NEURONTIN) 300 MG capsule Take 2 capsules (600 mg total) by mouth 3 (three) times daily. 3/29/21 4/28/21  Hortensia Alston NP   mirtazapine (REMERON) 30 MG tablet Take 1 tablet (30 mg total) by mouth every evening. 3/29/21 4/28/21  Hortensia Alston NP   paliperidone palmitate (INVEGA SUSTENNA) 234 mg/1.5 mL Syrg injection Inject 1.5 mLs (234 mg total) into the muscle every 28 days. 4/23/21 5/23/21  Hortensia Alston NP   QUEtiapine  (SEROQUEL) 50 MG tablet Take 1 tablet (50 mg total) by mouth every evening. 1/7/23 2/6/23  Maine Fernandez NP   risperiDONE 120 mg sers Inject 120 mg into the skin every 30 days. 2/5/23   Maine Fernandez NP         OTC Meds: none    Scheduled Meds:    folic acid  1 mg Oral Daily    multivitamin  1 tablet Oral Daily    mupirocin   Nasal BID    nicotine  1 patch Transdermal Daily      PRN Meds: hydrOXYzine HCL, OLANZapine **AND** OLANZapine, ondansetron, promethazine   Psychotherapeutics (From admission, onward)      Start     Stop Route Frequency Ordered    02/17/23 1130  OLANZapine tablet 10 mg  (Olanzapine PRN (</= 66 yo))        See Hyperspace for full Linked Orders Report.    -- Oral Every 6 hours PRN 02/17/23 1130    02/17/23 1130  OLANZapine injection 10 mg  (Olanzapine PRN (</= 66 yo))        See Hyperspace for full Linked Orders Report.    -- IM Every 6 hours PRN 02/17/23 1130            ALLERGIES   Review of patient's allergies indicates:   Allergen Reactions    Amoxicillin        NEUROLOGIC HISTORY  Seizures: No  Head trauma: No     SOCIAL HISTORY:  Developmental/Childhood:Achieved all developmental milestones timely  Education: Some college   Employment Status/Finances:Unemployed   Relationship Status/Sexual Orientation: single  Children: 0  Housing Status: Homeless    history:  NO   Access to Firearms: NO ;  Locked up? n/a  Judaism:Non-spiritual  Recreational activities: none      SUBSTANCE ABUSE HISTORY   Recreational Drugs: ecstasy and marijuana   Use of Alcohol: minimal use  Rehab History:yes   Tobacco Use:yes     LEGAL HISTORY:   Past charges/incarcerations: NO  Pending charges:NO    FAMILY PSYCHIATRIC HISTORY   Family History   Problem Relation Age of Onset    Mental illness Brother     Schizophrenia Maternal Uncle     Diabetes Maternal Grandmother     Diabetes Paternal Grandmother          ROS  General ROS: negative  Ophthalmic ROS: negative  ENT ROS: negative  Allergy and Immunology ROS:  negative  Hematological and Lymphatic ROS: negative  Endocrine ROS: negative  Respiratory ROS: no cough, shortness of breath, or wheezing  Cardiovascular ROS: no chest pain or dyspnea on exertion  Gastrointestinal ROS: no abdominal pain, change in bowel habits, or black or bloody stools  Genito-Urinary ROS: no dysuria, trouble voiding, or hematuria  Musculoskeletal ROS: negative  Neurological ROS: no TIA or stroke symptoms  Dermatological ROS: negative        EXAMINATION    PHYSICAL EXAM  Reviewed note/exam by JAZMIN Downing from 2/17/23 at 1:47 PM    VITALS   Vitals:    02/17/23 0956   BP: 135/74   Pulse: 67   Resp: 16   Temp: 97.7 °F (36.5 °C)        Body mass index is 38.17 kg/m².        PAIN  0/10  Subjective report of pain matches objective signs and symptoms: Yes    LABORATORY DATA   Recent Results (from the past 72 hour(s))   COVID-19 Rapid Screening    Collection Time: 02/16/23 11:36 PM   Result Value Ref Range    SARS-CoV-2 RNA, Amplification, Qual Negative Negative   CBC auto differential    Collection Time: 02/16/23 11:46 PM   Result Value Ref Range    WBC 9.41 3.90 - 12.70 K/uL    RBC 4.25 (L) 4.60 - 6.20 M/uL    Hemoglobin 12.3 (L) 14.0 - 18.0 g/dL    Hematocrit 37.0 (L) 40.0 - 54.0 %    MCV 87 82 - 98 fL    MCH 28.9 27.0 - 31.0 pg    MCHC 33.2 32.0 - 36.0 g/dL    RDW 11.9 11.5 - 14.5 %    Platelets 329 150 - 450 K/uL    MPV 9.0 (L) 9.2 - 12.9 fL    Immature Granulocytes 0.4 0.0 - 0.5 %    Gran # (ANC) 5.7 1.8 - 7.7 K/uL    Immature Grans (Abs) 0.04 0.00 - 0.04 K/uL    Lymph # 2.9 1.0 - 4.8 K/uL    Mono # 0.6 0.3 - 1.0 K/uL    Eos # 0.2 0.0 - 0.5 K/uL    Baso # 0.03 0.00 - 0.20 K/uL    nRBC 0 0 /100 WBC    Gran % 60.6 38.0 - 73.0 %    Lymph % 30.9 18.0 - 48.0 %    Mono % 6.2 4.0 - 15.0 %    Eosinophil % 1.6 0.0 - 8.0 %    Basophil % 0.3 0.0 - 1.9 %    Differential Method Automated    Comprehensive metabolic panel    Collection Time: 02/16/23 11:46 PM   Result Value Ref Range    Sodium 139 136 - 145 mmol/L     Potassium 3.8 3.5 - 5.1 mmol/L    Chloride 107 95 - 110 mmol/L    CO2 22 (L) 23 - 29 mmol/L    Glucose 96 70 - 110 mg/dL    BUN 11 6 - 20 mg/dL    Creatinine 0.9 0.5 - 1.4 mg/dL    Calcium 8.9 8.7 - 10.5 mg/dL    Total Protein 7.4 6.0 - 8.4 g/dL    Albumin 4.3 3.5 - 5.2 g/dL    Total Bilirubin 0.5 0.1 - 1.0 mg/dL    Alkaline Phosphatase 68 55 - 135 U/L    AST 13 10 - 40 U/L    ALT 12 10 - 44 U/L    Anion Gap 10 8 - 16 mmol/L    eGFR >60 >60 mL/min/1.73 m^2   TSH    Collection Time: 02/16/23 11:46 PM   Result Value Ref Range    TSH 1.628 0.400 - 4.000 uIU/mL   Ethanol    Collection Time: 02/16/23 11:46 PM   Result Value Ref Range    Alcohol, Serum <10 <10 mg/dL   Acetaminophen level    Collection Time: 02/16/23 11:46 PM   Result Value Ref Range    Acetaminophen (Tylenol), Serum <3.0 (L) 10.0 - 20.0 ug/mL   Urinalysis, Reflex to Urine Culture Urine, Clean Catch    Collection Time: 02/17/23  3:29 AM    Specimen: Urine   Result Value Ref Range    Specimen UA Urine, Clean Catch     Color, UA Yellow Yellow, Straw, Luz    Appearance, UA Clear Clear    pH, UA 6.0 5.0 - 8.0    Specific Gravity, UA 1.025 1.005 - 1.030    Protein, UA Negative Negative    Glucose, UA Negative Negative    Ketones, UA Negative Negative    Bilirubin (UA) Negative Negative    Occult Blood UA Negative Negative    Nitrite, UA Negative Negative    Urobilinogen, UA Negative <2.0 EU/dL    Leukocytes, UA Negative Negative   Drug screen panel, emergency    Collection Time: 02/17/23  3:29 AM   Result Value Ref Range    Benzodiazepines Negative Negative    Methadone metabolites Negative Negative    Cocaine (Metab.) Negative Negative    Opiate Scrn, Ur Negative Negative    Barbiturate Screen, Ur Negative Negative    Amphetamine Screen, Ur Negative Negative    THC Negative Negative    Phencyclidine Negative Negative    Creatinine, Urine 246.8 23.0 - 375.0 mg/dL    Toxicology Information SEE COMMENT       No results found for: PHENYTOIN, PHENOBARB,  "VALPROATE, CBMZ      CONSTITUTIONAL  General Appearance: unremarkable, age appropriate     MUSCULOSKELETAL  Muscle Strength and Tone:not examined, no tremor, no tic  Abnormal Involuntary Movements: No  Gait and Station: non-ataxic     PSYCHIATRIC   Level of Consciousness: awake and alert   Orientation: person, place, situation, and time  Grooming: hospital karissa  Psychomotor Behavior: cooperative, redirectable, eye contact minimal  Speech: normal tone, normal rate, normal volume, soft  Language: grossly intact  Mood: "ok"  Affect: Flat  Thought Process: blocking and loose associations  Associations: loose at times   Thought Content: denies SI and denies HI  Perceptions: denies AH and denies  VH  Memory: Able to recall past events, Remote intact, and Recent intact  Attention:Easily distracted  Fund of Knowledge: Aware of current events and Vocabulary appropriate   Estimate if Intelligence:  Average based on work/education history, vocabulary and mental status exam  Insight: limited awareness of illness  Judgment: limited secondary to psychosis    PSYCHOSOCIAL    PSYCHOSOCIAL STRESSORS   family, financial, health, and occupational     FUNCTIONING RELATIONSHIPS   alone & isolated    STRENGTHS AND LIABILITIES   Strength: Patient accepts guidance/feedback, Strength: Patient is expressive/articulate., Liability: Patient is unstable., Liability: Patient lacks coping skills.    Is the patient aware of the biomedical complications associated with substance abuse and mental illness? yes    Does the patient have an Advance Directive for Mental Health treatment? no  (If yes, inform patient to bring copy.)        MEDICAL DECISION MAKING        ASSESSMENT       Schizophrneia, chronic with acute exacerbation  MDD, recurrent, severe with psychotic features  Unspecified Anxiety Disorder    Psychosocial stressors    Nicotine dependence  Polysubstance abuse      PROBLEM LIST AND MANAGEMENT PLANS    Psychosis: pt counseled  -resume " Risperdal at 0.5 mg po BID    Depression: pt counseled  -resume Wellbutrin XL at 150 mg po q day    Anxiety: pt counseled  -start vistaril prn    Psychosocial stressors: pt counseled  -SW consulted to assist with resources    Nicotine dependence: pt counseled  -start Nicotine 14 mg patch dermal q day    Polysubstance abuse: pt counseled      PRESCRIPTION DRUG MANAGEMENT  Compliance: no  Side Effects: no  Regimen Adjustments: see above    Discussed diagnosis, risks and benefits of proposed treatment vs alternative treatments vs no treatment, potential side effects of these treatments and the inherent unpredictability of treatment. The patient expresses understanding of the above and displays the capacity to agree with this treatment given said understanding. Patient also agrees that, currently, the benefits outweigh the risks and would like to pursue/continue treatment at this time.    Any medications being used off-label were discussed with the patient inclusive of the evidence base for the use of the medications and consent was obtained for the off-label use of the medication.         DIAGNOSTIC TESTING  Labs reviewed with patient; follow up pending labs    Disposition:  -Will attempt to obtain outside psychiatric records if available  -SW to assist with aftercare planning and collateral  -Once stable discharge home with outpatient follow up care and/or rehab  -Continue inpatient treatment under a PEC and/or CEC for danger to self/ danger to others/grave disability as evident by significant psychotic thought disorder, aggressive neuroleptic titration, danger to others, gravely disabled, suicidal ideation, and severe obstacles to placement        Ravin Alvarado MD  Psychiatry

## 2023-02-17 NOTE — ED NOTES
"Pt to ED via EMS for wandering the street and needing an ambulance. Pt has flat affect. States " when do I get to leave? I was just wandering". Pt denies SI or HI. Pt calm and cooperative. Pt undressed and placed in paper scrubs, tech at bedside for direct observation and wanded by security  "

## 2023-02-17 NOTE — MEDICAL/APP STUDENT
"PSYCHIATRY INPATIENT ADMISSION NOTE - H & P      2/17/2023 10:01 AM   Price Godoy   1995   [id number]         DATE OF ADMISSION: 2/17/2023  9:10 AM    SITE: [facility]    CURRENT LEGAL STATUS: PEC and/or CEC      HISTORY    CHIEF COMPLAINT   Price Godoy is a 28 y.o. male with a past psychiatric history of  schizophrenia vs. Schizoaffective disorder  currently admitted to the inpatient unit with the following chief complaint:  bizarre behavior, suicidal ideation     HPI   The patient was seen and examined. The chart was reviewed.    The patient presented to the ER on 2/17/2023 .    The patient was medically cleared and admitted to the [facility].    Patient has an extensive history of psychiatric hospitalizations. Most recently leaving [facility] 1 month ago. Patient states, "I'm just looking for a home, just been lost for a while." Patient could not elaborate further as he was easily distracted with extensive thought blocking. He states that the abilify was working for him but would prefer daily medication instead of the long acting injection. Patient states this due to, "wanting freedom to feel himself sometimes." He states he doesn't have any support system, transportation, or money to support himself. He has most recently been in [facility]. Patient was pushed to provide more information but repeatedly gave one word answers and changed his answers numerous times.       Symptoms of Depression: diminished mood - Yes, loss of interest/anhedonia - Yes;  recurrent - Yes, >14 days - Yes, diminished energy - Yes, change in sleep - No, change in appetite - No, diminished concentration or cognition or indecisiveness - Yes, PMA/R -  No, excessive guilt or hopelessness or worthlessness - Yes, suicidal ideations - Yes    Changes in Sleep: trouble with initiation- No, maintenance, - No early morning awakening with inability to return to sleep - No, hypersomnolence - No    Suicidal- active/passive " "ideations - No, organized plans, future intentions - No    Homicidal ideations: active/passive ideations - No, organized plans, future intentions - No    Symptoms of psychosis: hallucinations - No, delusions - No, disorganized speech - No, disorganized behavior or abnormal motor behavior - No, or negative symptoms (diminshed emotional expression, avolition, anhedonia, alogia, asociality) - No, active phase symptoms >1 month - No, continuous signs of illness > 6 months - No, since onset of illness decreased level of functioning present - No    Symptoms of faby or hypomania: elevated, expansive, or irritable mood with increased energy or activity - No; > 4 days - No,  >7 days - No; with inflated self-esteem or grandiosity - No, decreased need for sleep - No, increased rate of speech - No, FOI or racing thoughts - No, distractibility - No, increased goal directed activity or PMA - No, risky/disinhibited behavior - No    Symptoms of MAGGIE: excessive anxiety/worry/fear, more days than not, about numerous issues - No, ongoing for >6 months - No, difficult to control - No, with restlessness - No, fatigue - No, poor concentration - No, irritability - No, muscle tension - No, sleep disturbance - No; causes functionally impairing distress - No    Symptoms of Panic Disorder: recurrent panic attacks (palpitations/heart racing, sweating, shakiness, dyspnea, choking, chest pain/discomfort, Gi symptoms, dizzy/lightheadedness, hot/col flashes, paresthesias, derealization, fear of losing control or fear of dying or fear of "going crazy") - No, precipitated - No, un-precipitated - No, source of worry and/or behavioral changes secondary for 1 month or longer- No, agoraphobia - No    Symptoms of PTSD: h/o trauma exposure - No; re-experiencing/intrusive symptoms - No, avoidant behavior - No, 2 or more negative alterations in cognition or mood - No, 2 or more hyperarousal symptoms - No; with dissociative symptoms - No, ongoing for 1 or " more  months - No    Symptoms of OCD: obsessions (recurrent thoughts/urges/images; intrusive and/or unwanted; uses other thoughts/actions to suppress) - No; compulsions (repetitive behaviors used to lower distress/anxiety/obsessions) - No, time-consuming (over 1 hour per day) or cause significant distress/impairment - - No    Symptoms of Anorexia: restriction of caloric intake leading to significantly low body weight - No, intense fear of gaining weight or persistent behavior that interferes with weight gain even thought at a significantly low weight - No, disturbance in the way in which one's body weight or shape is experienced, undue influence of body weight or shape on self evaluation, or persistent lack of recognition of the seriousness of the current low body weight - No    Symptoms of Bulimia: recurrent episodes of binge eating (definitely larger amount  than what others would eat and lack of a sense of control over eating during episode) - No, recurrent inappropriate compensatory behaviors in order to prevent weight gain (fasting, medications, exercise, vomiting) - No, binges and compensatory behaviors both occur on average at least once a week for 3 months - No, self evaluations is unduly influenced by body shape/weight- - No    Symptoms of Binge eating: recurrent episodes of binge eating (definitely larger amount than what others would eat and lack of a sense of control over eating during episode) - No, 3 or more of following (eating much more rapidly, eating until uncomfortably full, large amounts when not hungry, eating alone because of embarrassed by how much,  feeling disgusted with oneself, depressed or very guilty afterward) - No, distress regarding binges - No, binges occur on average at least once a week for 3 months - No      Substance/s:  Taken in larger amounts or over longer periods than intended: No,  Persistent desire or unsuccessful attempts to cut down or stop: No,  Great deal of time spent  seeking, using or recovering from: No,  Craving or strong desire to use: No,  Recurrent use despite failure to meet major role obligation: No,  Continued use despite persistent or recurrent social/interparsonal issues due to use: No,  Important social/work/recreational activities given up due to use: No,  Recurrent use in physically hazardous situations: No,  Continued use despite knowledge of persistent physical or psychological problem: No,  Tolerance (either increased need or diminished effect): No,      Psychotherapy:  Target symptoms: psychosis  Why chosen therapy is appropriate versus another modality: relevant to diagnosis  Outcome monitoring methods: observation  Therapeutic intervention type: insight oriented psychotherapy  Topics discussed/themes: financial stressors  The patient's response to the intervention is accepting. The patient's progress toward treatment goals is ***.   Duration of intervention: 15 minutes.      PAST PSYCHIATRIC HISTORY  Previous Psychiatric Hospitalizations: Yes  Previous SI/HI: Yes,  Previous Suicide Attempts: No,   Previous Medication Trials: Yes,  Psychiatric Care (current & past): No,  History of Psychotherapy: Yes,  History of Violence: No,  History of sexual/physical abuse: No,    PAST MEDICAL & SURGICAL HISTORY   Past Medical History:   Diagnosis Date    Anxiety     Depression     History of psychiatric hospitalization     Suicide attempt      Past Surgical History:   Procedure Laterality Date    APPENDECTOMY             CURRENT PSYCH MEDICATION REGIMEN   Not been taking any medications since leaving the hospital   Current Medication side effects:  none  Current Medication compliance:  No    Previous psych meds trials  ******    Home Meds:   Prior to Admission medications    Medication Sig Start Date End Date Taking? Authorizing Provider   cloNIDine (CATAPRES) 0.1 MG tablet Take 1 tablet (0.1 mg total) by mouth nightly. 3/29/21 4/28/21  Hortensia Alston NP   FLUoxetine 40 MG  capsule Take 1 capsule (40 mg total) by mouth 2 (two) times daily. 3/29/21 4/28/21  Hortensia Alston NP   gabapentin (NEURONTIN) 300 MG capsule Take 2 capsules (600 mg total) by mouth 3 (three) times daily. 3/29/21 4/28/21  Hortensia Alston NP   mirtazapine (REMERON) 30 MG tablet Take 1 tablet (30 mg total) by mouth every evening. 3/29/21 4/28/21  Hortensia Alston NP   paliperidone palmitate (INVEGA SUSTENNA) 234 mg/1.5 mL Syrg injection Inject 1.5 mLs (234 mg total) into the muscle every 28 days. 4/23/21 5/23/21  Hortensai Alston NP   QUEtiapine (SEROQUEL) 50 MG tablet Take 1 tablet (50 mg total) by mouth every evening. 1/7/23 2/6/23  Maine Fernandez NP   risperiDONE 120 mg sers Inject 120 mg into the skin every 30 days. 2/5/23   Maine Fernandez NP       ***  OTC Meds: ***    Scheduled Meds:    PRN Meds:    Psychotherapeutics (From admission, onward)      None            ALLERGIES   Review of patient's allergies indicates:   Allergen Reactions    Amoxicillin        NEUROLOGIC HISTORY  Seizures: No  Head trauma: No    SOCIAL HISTORY:  Developmental/Childhood:Achieved all developmental milestones timely  Education: Some college   Employment Status/Finances:Unemployed   Relationship Status/Sexual Orientation: single  Children: 0  Housing Status: Homeless    history:  NO   Access to Firearms: NO ;  Locked up? n/a  Advent:Non-spiritual  Recreational activities: none     SUBSTANCE ABUSE HISTORY   Recreational Drugs: ecstasy and marijuana   Use of Alcohol: minimal use  Rehab History:yes   Tobacco Use:yes    LEGAL HISTORY:   Past charges/incarcerations: NO  Pending charges:NO    FAMILY PSYCHIATRIC HISTORY   Family History   Problem Relation Age of Onset    Mental illness Brother     Schizophrenia Maternal Uncle     Diabetes Maternal Grandmother     Diabetes Paternal Grandmother               ROS  Review of Systems   Psychiatric/Behavioral:  Positive for depression.    All other systems reviewed and are  negative.      EXAMINATION    PHYSICAL EXAM  Reviewed note/exam by  *** from *** at ***    VITALS   Vitals:    02/17/23 0956   BP: 135/74   Pulse: 67   Resp: 16   Temp: 97.7 °F (36.5 °C)        Body mass index is 38.17 kg/m².        PAIN  0/10  Subjective report of pain matches objective signs and symptoms: {YES,NO, WILDCARD(MULTI):87597}    LABORATORY DATA   Recent Results (from the past 72 hour(s))   COVID-19 Rapid Screening    Collection Time: 02/16/23 11:36 PM   Result Value Ref Range    SARS-CoV-2 RNA, Amplification, Qual Negative Negative   CBC auto differential    Collection Time: 02/16/23 11:46 PM   Result Value Ref Range    WBC 9.41 3.90 - 12.70 K/uL    RBC 4.25 (L) 4.60 - 6.20 M/uL    Hemoglobin 12.3 (L) 14.0 - 18.0 g/dL    Hematocrit 37.0 (L) 40.0 - 54.0 %    MCV 87 82 - 98 fL    MCH 28.9 27.0 - 31.0 pg    MCHC 33.2 32.0 - 36.0 g/dL    RDW 11.9 11.5 - 14.5 %    Platelets 329 150 - 450 K/uL    MPV 9.0 (L) 9.2 - 12.9 fL    Immature Granulocytes 0.4 0.0 - 0.5 %    Gran # (ANC) 5.7 1.8 - 7.7 K/uL    Immature Grans (Abs) 0.04 0.00 - 0.04 K/uL    Lymph # 2.9 1.0 - 4.8 K/uL    Mono # 0.6 0.3 - 1.0 K/uL    Eos # 0.2 0.0 - 0.5 K/uL    Baso # 0.03 0.00 - 0.20 K/uL    nRBC 0 0 /100 WBC    Gran % 60.6 38.0 - 73.0 %    Lymph % 30.9 18.0 - 48.0 %    Mono % 6.2 4.0 - 15.0 %    Eosinophil % 1.6 0.0 - 8.0 %    Basophil % 0.3 0.0 - 1.9 %    Differential Method Automated    Comprehensive metabolic panel    Collection Time: 02/16/23 11:46 PM   Result Value Ref Range    Sodium 139 136 - 145 mmol/L    Potassium 3.8 3.5 - 5.1 mmol/L    Chloride 107 95 - 110 mmol/L    CO2 22 (L) 23 - 29 mmol/L    Glucose 96 70 - 110 mg/dL    BUN 11 6 - 20 mg/dL    Creatinine 0.9 0.5 - 1.4 mg/dL    Calcium 8.9 8.7 - 10.5 mg/dL    Total Protein 7.4 6.0 - 8.4 g/dL    Albumin 4.3 3.5 - 5.2 g/dL    Total Bilirubin 0.5 0.1 - 1.0 mg/dL    Alkaline Phosphatase 68 55 - 135 U/L    AST 13 10 - 40 U/L    ALT 12 10 - 44 U/L    Anion Gap 10 8 - 16 mmol/L     eGFR >60 >60 mL/min/1.73 m^2   TSH    Collection Time: 02/16/23 11:46 PM   Result Value Ref Range    TSH 1.628 0.400 - 4.000 uIU/mL   Ethanol    Collection Time: 02/16/23 11:46 PM   Result Value Ref Range    Alcohol, Serum <10 <10 mg/dL   Acetaminophen level    Collection Time: 02/16/23 11:46 PM   Result Value Ref Range    Acetaminophen (Tylenol), Serum <3.0 (L) 10.0 - 20.0 ug/mL   Urinalysis, Reflex to Urine Culture Urine, Clean Catch    Collection Time: 02/17/23  3:29 AM    Specimen: Urine   Result Value Ref Range    Specimen UA Urine, Clean Catch     Color, UA Yellow Yellow, Straw, Luz    Appearance, UA Clear Clear    pH, UA 6.0 5.0 - 8.0    Specific Gravity, UA 1.025 1.005 - 1.030    Protein, UA Negative Negative    Glucose, UA Negative Negative    Ketones, UA Negative Negative    Bilirubin (UA) Negative Negative    Occult Blood UA Negative Negative    Nitrite, UA Negative Negative    Urobilinogen, UA Negative <2.0 EU/dL    Leukocytes, UA Negative Negative   Drug screen panel, emergency    Collection Time: 02/17/23  3:29 AM   Result Value Ref Range    Benzodiazepines Negative Negative    Methadone metabolites Negative Negative    Cocaine (Metab.) Negative Negative    Opiate Scrn, Ur Negative Negative    Barbiturate Screen, Ur Negative Negative    Amphetamine Screen, Ur Negative Negative    THC Negative Negative    Phencyclidine Negative Negative    Creatinine, Urine 246.8 23.0 - 375.0 mg/dL    Toxicology Information SEE COMMENT       No results found for: PHENYTOIN, PHENOBARB, VALPROATE, CBMZ        CONSTITUTIONAL  General Appearance: unremarkable, age appropriate    MUSCULOSKELETAL  Muscle Strength and Tone:not examined, no tremor, no tic  Abnormal Involuntary Movements: No  Gait and Station: non-ataxic    PSYCHIATRIC   Level of Consciousness: awake and alert   Orientation: person, place, situation, and year  Grooming: Casually dressed  Psychomotor Behavior: cooperative, redirectable, eye contact  "minimal  Speech: normal tone, normal rate, normal volume, soft  Language: grossly intact  Mood: "ok"  Affect: Flat  Thought Process: blocking and loose associations  Associations: loose   Thought Content: denies SI and denies HI  Perceptions: denies AH and denies  VH  Memory: Able to recall past events, Remote intact, and Recent intact  Attention:Easily distracted  Fund of Knowledge: Aware of current events and Vocabulary appropriate   Estimate if Intelligence:  Average based on work/education history, vocabulary and mental status exam  Insight: limited awareness of illness  Judgment: limited      PSYCHOSOCIAL    PSYCHOSOCIAL STRESSORS   family, financial, health, and occupational    FUNCTIONING RELATIONSHIPS   alone & isolated    STRENGTHS AND LIABILITIES   Strength: Patient accepts guidance/feedback, Liability: Patient has no suport network.    Is the patient aware of the biomedical complications associated with substance abuse and mental illness? yes    Does the patient have an Advance Directive for Mental Health treatment? no  (If yes, inform patient to bring copy.)        MEDICAL DECISION MAKING        ASSESSMENT       Schizophrenia exacerbation       PROBLEM LIST AND MANAGEMENT PLANS        PRESCRIPTION DRUG MANAGEMENT  Compliance: no  Side Effects: no  Regimen Adjustments: see above    Discussed diagnosis, risks and benefits of proposed treatment vs alternative treatments vs no treatment, potential side effects of these treatments and the inherent unpredictability of treatment. The patient expresses understanding of the above and displays the capacity to agree with this treatment given said understanding. Patient also agrees that, currently, the benefits outweigh the risks and would like to pursue/continue treatment at this time.    Any medications being used off-label were discussed with the patient inclusive of the evidence base for the use of the medications and consent was obtained for the off-label use " of the medication.         DIAGNOSTIC TESTING  Labs reviewed with patient; follow up pending labs    Disposition:  -Will attempt to obtain outside psychiatric records if available  -SW to assist with aftercare planning and collateral  -Once stable discharge home with outpatient follow up care and/or rehab  -Continue inpatient treatment under a PEC and/or CEC for danger to self/ danger to others/grave disability as evident by significant psychotic thought disorder        Reji Rollins  [facility]

## 2023-02-17 NOTE — CONSULTS
[facility]  Psychiatry  Telepsychiatry Consult Note    Please see previous notes:    Patient agreeable to consultation via telepsychiatry.    Tele-Consultation from Psychiatry started: 2/17/2023 at 3:09am  The chief complaint leading to psychiatric consultation is: psychosis  This consultation was requested by Abdiel Liu the Emergency Department attending physician.  The location of the consulting psychiatrist is [address] [place] [address].  The patient location is  [facility] EMERGENCY DEPARTMENT   The patient arrived at the ED at: todY    Also present with the patient at the time of the consultation: none    Patient Identification:       Patient information was obtained from patient, past medical records, and ER records.  Patient presented voluntarily to the Emergency Department ambulatory.    Consults  Teleconsult Time Documentation  Subjective:     History of Present Illness:    Price Godoy is a 28 y.o. male.  With past psychiatric history of schizophrenia versus schizoaffective disorder cannabis abuse presents to the emergency room after bizarre behavior of flagging down and ambulance and asking them to bring to the hospital.    Today on exam patient seen lying in bed looking disheveled.  Patient reports that I walk to the hospital patient states that he did this because I did not have any hope upon further enquiring what he needs by this patient endorses suicidal ideations increased hopelessness helplessness anhedonia and lack of motivation.  On exam patient seems to have increased thought blocking increased delayed responses to questions and some potential responding to internal stimuli.  Patient denied any acute psychotic symptoms denied any auditory visual hallucinations or paranoia patient reports that he is homeless and does not know how to get help with his mood and has been feeling suicidal about it.  Issue unable to provide any collateral information patient did report  "having act team however unsure if he has been following up with them regularly due to patient being homeless.  Patient also admitted missing his monthly long-acting depot shot he last received this last month during his inpatient hospitalization.      Psychiatric History:   Previous Psychiatric Hospitalizations:  Multiple admissions in the recent past most recent admission was 1 month ago psychosisPrevious Medication Trials: Yes    Previous Suicide Attempts: yes    History of Violence: yes  History of Depression: denies now  History of Maria Alejandra: denies now  History of Auditory/Visual Hallucination yes  History of Delusions: nothing elicited on interview  Outpatient psychiatrist (current & past): has been complying I believe but has been w/ ACT in the past - Dr. Ruby possibly     Substance Abuse History:  Tobacco:Yes  Alcohol: Yes  Illicit Substances:Yes  Detox/Rehab: No     Legal History: Past charges/incarcerations: Yes      Family Psychiatric History: yes        Social History:   Single, homeless, no weapons     Psychiatric Mental Status Exam:  Arousal: lethargic  Sensorium/Orientation: oriented to grossly intact  Behavior/Cooperation: reluctant to participate   Speech: slowed increased latency in response  Language: grossly intact  Mood: " ok "   Affect: blunted  Thought Process: concrete, logical and increase thought delay  Thought Content:   Auditory hallucinations: YES:       Visual hallucinations: NO  Paranoia: YES:       Delusions:  NO  Suicidal ideation: NO  Homicidal ideation: NO  Attention/Concentration:  intact  Memory: not participating  Insight: has awareness of illness  Judgment: limited     Past Medical History:   Past Medical History:   Diagnosis Date    Anxiety     Depression     History of psychiatric hospitalization     Suicide attempt       Laboratory Data:   Labs Reviewed   CBC W/ AUTO DIFFERENTIAL - Abnormal; Notable for the following components:       Result Value    RBC 4.25 (*)     Hemoglobin " 12.3 (*)     Hematocrit 37.0 (*)     MPV 9.0 (*)     All other components within normal limits   COMPREHENSIVE METABOLIC PANEL - Abnormal; Notable for the following components:    CO2 22 (*)     All other components within normal limits   ACETAMINOPHEN LEVEL - Abnormal; Notable for the following components:    Acetaminophen (Tylenol), Serum <3.0 (*)     All other components within normal limits   TSH   ALCOHOL,MEDICAL (ETHANOL)   SARS-COV-2 RNA AMPLIFICATION, QUAL   URINALYSIS, REFLEX TO URINE CULTURE   DRUG SCREEN PANEL, URINE EMERGENCY           Allergies:   Review of patient's allergies indicates:   Allergen Reactions    Amoxicillin        Medications in ER: Medications - No data to display    Medications at home:  Unable to report states that he is followed by act team who keeps track of his medications however patient is homeless unsure if that is more recent.    No new subjective & objective note has been filed under this hospital service since the last note was generated.      Assessment - Diagnosis - Goals:     Diagnosis/Impression:   Schizophrenia acute exacerbation   Cannabis use disorder     Recommendations  DISPOSITION- Once medically cleared;   Seek Involuntary Inpatient Psychiatric admission for stabilization of acute psychiatric symptoms and a safe disposition plan is enacted. The Pt &/or their family was informed that the pt will be transferred to an Inpt unit per ED placement team.     Psych meds  PRN meds- Haldol 5mg + Benadryl 50mg + Ativan 2mg PO/IM q 6 for psychotic agitation.     Legal-Seek/continue PEC because pt is in imminent danger of hurting self or others and is gravely disabled.      More than 50% of the time was spent counseling/coordinating care    Consulting clinician was informed of the encounter and consult note.    Consultation ended: 2/17/2023 at 3:28am    Sophie Correa MD   Psychiatry  [facility]

## 2023-02-17 NOTE — NURSING
Report called by Christopher at [facility] who said pt medically cleared and PEC'd by Dr Umer Meadows.  Pt was wandering streets in the rain, flat affect, not oriented to time, felt depressed, had suicidal thoughts without plan earlier in the week.  Pt wanted no family contacted about transfer.  Telepsych done.  Hx schizophrenia.  VS stable.  No wounds.  Not currently hallucinating.  Calm and cooperative.  Christopher said U would be called when pt leaves their ER.

## 2023-02-17 NOTE — ED NOTES
Encouraged pt to provide urine sample. Pt unable to do at this time. Urinal provided. Will reattempt.

## 2023-02-17 NOTE — NURSING
"Per PEC- "appears slow and slightly catatonic. Asking for help with meds." On admit Pt states he is here "so you can get me a house, apartment, or even a studio. I never had one of those. I dont have a mom or dad." States has been off meds for a long time. Appears to be RIS, slowed thought processes, and delayed responses.Denies SI/HI/AH/VH. Oriented to unit and rules reviewed.   "

## 2023-02-17 NOTE — SUBJECTIVE & OBJECTIVE
"Past Medical History:   Diagnosis Date    Anxiety     Depression     History of psychiatric hospitalization     Suicide attempt        Past Surgical History:   Procedure Laterality Date    APPENDECTOMY         Review of patient's allergies indicates:   Allergen Reactions    Amoxicillin        No current facility-administered medications on file prior to encounter.     Current Outpatient Medications on File Prior to Encounter   Medication Sig    cloNIDine (CATAPRES) 0.1 MG tablet Take 1 tablet (0.1 mg total) by mouth nightly.    FLUoxetine 40 MG capsule Take 1 capsule (40 mg total) by mouth 2 (two) times daily.    gabapentin (NEURONTIN) 300 MG capsule Take 2 capsules (600 mg total) by mouth 3 (three) times daily.    mirtazapine (REMERON) 30 MG tablet Take 1 tablet (30 mg total) by mouth every evening.    paliperidone palmitate (INVEGA SUSTENNA) 234 mg/1.5 mL Syrg injection Inject 1.5 mLs (234 mg total) into the muscle every 28 days.    QUEtiapine (SEROQUEL) 50 MG tablet Take 1 tablet (50 mg total) by mouth every evening.    risperiDONE 120 mg sers Inject 120 mg into the skin every 30 days.     Family History       Problem Relation (Age of Onset)    Diabetes Maternal Grandmother, Paternal Grandmother    Mental illness Brother    Schizophrenia Maternal Uncle          Tobacco Use    Smoking status: Every Day     Packs/day: 1.00     Years: 2.00     Pack years: 2.00     Types: Cigarettes    Smokeless tobacco: Never    Tobacco comments:     Pt stated that he smokes 1 pack of tobacco daily.   Substance and Sexual Activity    Alcohol use: Yes     Alcohol/week: 1.0 standard drink     Types: 1 Cans of beer per week     Comment: rarely    Drug use: Yes     Types: Marijuana, "Crack" cocaine     Comment: last use tonight    Sexual activity: Yes     Partners: Male     Review of Systems   Constitutional:  Negative for chills and fever.   HENT:  Negative for congestion and sore throat.    Respiratory:  Negative for cough, chest " tightness and shortness of breath.    Cardiovascular:  Negative for chest pain, palpitations and leg swelling.   Gastrointestinal:  Negative for abdominal pain, diarrhea, nausea and vomiting.   Genitourinary:  Negative for flank pain, frequency and hematuria.   Musculoskeletal:  Negative for back pain and neck pain.   Skin:  Negative for rash and wound.   Neurological:  Negative for dizziness, seizures, syncope and headaches.   Psychiatric/Behavioral:  Positive for dysphoric mood and suicidal ideas. Negative for agitation, confusion and self-injury.    Objective:     Vital Signs (Most Recent):  Temp: 97.7 °F (36.5 °C) (02/17/23 0956)  Pulse: 67 (02/17/23 0956)  Resp: 16 (02/17/23 0956)  BP: 135/74 (02/17/23 0956) Vital Signs (24h Range):  Temp:  [97.7 °F (36.5 °C)-98.6 °F (37 °C)] 97.7 °F (36.5 °C)  Pulse:  [67-82] 67  Resp:  [16-20] 16  SpO2:  [99 %-100 %] 99 %  BP: (120-136)/(63-80) 135/74     Weight: 124.2 kg (273 lb 11.2 oz)  Body mass index is 38.17 kg/m².    Physical Exam  Vitals and nursing note reviewed.   Constitutional:       General: He is not in acute distress.     Appearance: He is well-developed.   HENT:      Head: Normocephalic and atraumatic.   Eyes:      Pupils: Pupils are equal, round, and reactive to light.   Neck:      Thyroid: No thyromegaly.   Cardiovascular:      Rate and Rhythm: Normal rate and regular rhythm.      Heart sounds: Normal heart sounds. No murmur heard.  Pulmonary:      Effort: Pulmonary effort is normal. No respiratory distress.      Breath sounds: Normal breath sounds. No wheezing or rales.   Abdominal:      General: Bowel sounds are normal. There is no distension.      Palpations: Abdomen is soft. There is no mass.      Tenderness: There is no abdominal tenderness.   Musculoskeletal:         General: No deformity. Normal range of motion.   Lymphadenopathy:      Cervical: No cervical adenopathy.   Skin:     General: Skin is warm and dry.      Findings: No erythema or rash.    Neurological:      Mental Status: He is alert and oriented to person, place, and time.      Comments: CN II visual fields full to confrontation  CN III, Iv, VI- pupils ERRL   CN III- no palsy  Nystagmus; none   Diplopia- none  ophthalmoparesis- none  CN V- facial sensation intact  CN VII- facial expression full and symmetric  CNVIII- normal  CN IV-Normal  CN X- normal  CN XI- Normal   CN XII normal          Significant Labs: All pertinent labs within the past 24 hours have been reviewed.  Urine Culture: No results for input(s): LABURIN in the last 48 hours.  Urine Studies:   Recent Labs   Lab 02/17/23  0329   COLORU Yellow   APPEARANCEUA Clear   PHUR 6.0   SPECGRAV 1.025   PROTEINUA Negative   GLUCUA Negative   KETONESU Negative   BILIRUBINUA Negative   OCCULTUA Negative   NITRITE Negative   UROBILINOGEN Negative   LEUKOCYTESUR Negative     UPT  No results found for this or any previous visit.  U/A    UDS  Results for orders placed or performed during the hospital encounter of 02/16/23   Drug screen panel, emergency   Result Value Ref Range    Benzodiazepines Negative Negative    Methadone metabolites Negative Negative    Cocaine (Metab.) Negative Negative    Opiate Scrn, Ur Negative Negative    Barbiturate Screen, Ur Negative Negative    Amphetamine Screen, Ur Negative Negative    THC Negative Negative    Phencyclidine Negative Negative    Creatinine, Urine 246.8 23.0 - 375.0 mg/dL    Toxicology Information SEE COMMENT      CBC  Results for orders placed or performed during the hospital encounter of 02/16/23   CBC auto differential   Result Value Ref Range    WBC 9.41 3.90 - 12.70 K/uL    RBC 4.25 (L) 4.60 - 6.20 M/uL    Hemoglobin 12.3 (L) 14.0 - 18.0 g/dL    Hematocrit 37.0 (L) 40.0 - 54.0 %    MCV 87 82 - 98 fL    MCH 28.9 27.0 - 31.0 pg    MCHC 33.2 32.0 - 36.0 g/dL    RDW 11.9 11.5 - 14.5 %    Platelets 329 150 - 450 K/uL    MPV 9.0 (L) 9.2 - 12.9 fL    Immature Granulocytes 0.4 0.0 - 0.5 %    Gran # (ANC)  5.7 1.8 - 7.7 K/uL    Immature Grans (Abs) 0.04 0.00 - 0.04 K/uL    Lymph # 2.9 1.0 - 4.8 K/uL    Mono # 0.6 0.3 - 1.0 K/uL    Eos # 0.2 0.0 - 0.5 K/uL    Baso # 0.03 0.00 - 0.20 K/uL    nRBC 0 0 /100 WBC    Gran % 60.6 38.0 - 73.0 %    Lymph % 30.9 18.0 - 48.0 %    Mono % 6.2 4.0 - 15.0 %    Eosinophil % 1.6 0.0 - 8.0 %    Basophil % 0.3 0.0 - 1.9 %    Differential Method Automated      CMP  Results for orders placed or performed during the hospital encounter of 02/16/23   Comprehensive metabolic panel   Result Value Ref Range    Sodium 139 136 - 145 mmol/L    Potassium 3.8 3.5 - 5.1 mmol/L    Chloride 107 95 - 110 mmol/L    CO2 22 (L) 23 - 29 mmol/L    Glucose 96 70 - 110 mg/dL    BUN 11 6 - 20 mg/dL    Creatinine 0.9 0.5 - 1.4 mg/dL    Calcium 8.9 8.7 - 10.5 mg/dL    Total Protein 7.4 6.0 - 8.4 g/dL    Albumin 4.3 3.5 - 5.2 g/dL    Total Bilirubin 0.5 0.1 - 1.0 mg/dL    Alkaline Phosphatase 68 55 - 135 U/L    AST 13 10 - 40 U/L    ALT 12 10 - 44 U/L    Anion Gap 10 8 - 16 mmol/L    eGFR >60 >60 mL/min/1.73 m^2     TSH  Results for orders placed or performed during the hospital encounter of 02/16/23   TSH   Result Value Ref Range    TSH 1.628 0.400 - 4.000 uIU/mL     ETOH  Results for orders placed or performed during the hospital encounter of 02/16/23   Ethanol   Result Value Ref Range    Alcohol, Serum <10 <10 mg/dL     Salicylate  Results for orders placed or performed during the hospital encounter of 07/25/19   Salicylate level   Result Value Ref Range    Salicylate Lvl <5.0 (L) 15.0 - 30.0 mg/dL     Acetaminophen  Results for orders placed or performed during the hospital encounter of 02/16/23   Acetaminophen level   Result Value Ref Range    Acetaminophen (Tylenol), Serum <3.0 (L) 10.0 - 20.0 ug/mL           Significant Imaging: I have reviewed and interpreted all pertinent imaging results/findings within the past 24 hours.    None

## 2023-02-17 NOTE — HPI
"Price Godoy is a 28 y.o. male  with a past psychiatric history of  schizophrenia vs. Schizoaffective disorder  currently admitted to the inpatient unit with the following chief complaint:  bizarre behavior, suicidal ideation         Consulted for medical H&P  Records reviewed     PER ED notes   Price Godoy is a 28 y.o male with a PMHx of anxiety, and depression, who presents to the ED via EMS for psychiatric evaluation tonight. History provided by an independent historian, EMS, who reports the patient was found wandering the streets by a  and requested an ambulance. Patient states he "is on the wrong medications, and wants medications to sleep more." Patient reports he sleeps 8 hours a day. Patient further notes he "can't be a man, can't talk sometimes, like having trouble getting his thoughts out." Patient further notes "his thinking is slow," but he is not depressed. Patient states his medications were changed recently. Patient reports nausea. No medications taken PTA. No alleviating or exacerbating factors noted. Denies headache, visual and auditory hallucinations, fever, or other associated symptoms. Patient reports he is currently homeless. Denies EtOH, or other recreational drug usage. Allergic to amoxicillin.     "

## 2023-02-17 NOTE — ED NOTES
Bed: 13main  Expected date: 2/16/23  Expected time: 11:03 PM  Means of arrival:   Comments:  Unruly EMS 29y/o psych

## 2023-02-17 NOTE — PROGRESS NOTES
"   02/17/23 1531   Assessment   Patient's Identification of the Problem Patient isolating in room, resting in bed. Per H&P chief complaint: bizarre behavior, suicidal thoughts, "I need help socially." Patient is easily distracted and has loose associations at times. Patient admits to negative leisure lifestyle of tabacco and alcohol (rare), history of marijuana.Patient is single, no children, some college, unemployed, homeless.   Leisure Interest No Interest   Leisure Barriers Loss of Interest;Lack of Finances;Energy Level;Poor Social Tolerance;Homeless   Treatment Focus To Increase Motivation;To Decrease Guardedness and Isolation;To Improve Leisure Awareness/Lifestyle/Interest;To Promote Successful and Safe Self Expression;To Improve Coping Skills;To Increase Energy Level       Treatment Recommendation:   1:1 Intervention (as needed)    Cognitive Stimulation Skilled Activity  Mild Exercises Skilled Activity  Coping Skilled Activity  Leisure Education and Awareness Skilled Activity    Treatment Goal(s):  Long Term Goals Refer To Master Treatment Plan    Short Term Treatment Goal(s)  Patient Will:  Comply with Treatment  Exhibit Improvement in Mood  Demonstrate Improvement in Thought Processes / Awareness  Demonstrate Constructive Expression of Feelings and Behavior  Identify (+) Leisure / Recreation Activities that Promote Wellness and Promote a Sober Lifestyle    Discharge Recommendations:  Encourage Patient to Actively Utilize Available Community Resources to Increase Leisure Involvement to Decrease Signs and Symptoms of Illness  Encourage Patient to Utilize Coping Skills on a Regular Basis to Reduce the Risk of Decompensating and Re-Hospitalizations  Follow Up with After Care Appointments  "

## 2023-02-17 NOTE — PROGRESS NOTES
02/17/23 1040   [facility] Group Therapy   Group Name Therapeutic Recreation   Participation Level None   Participation Quality Sleeping     Patient resting in assigned bed with eyes closed and did not respond verbally. CTRS will attempt to meet with the patient at a later time to initiate an assessment.

## 2023-02-18 LAB
CHOLEST SERPL-MCNC: 136 MG/DL (ref 120–199)
CHOLEST/HDLC SERPL: 3.3 {RATIO} (ref 2–5)
ESTIMATED AVG GLUCOSE: 85 MG/DL (ref 68–131)
HBA1C MFR BLD: 4.6 % (ref 4–5.6)
HDLC SERPL-MCNC: 41 MG/DL (ref 40–75)
HDLC SERPL: 30.1 % (ref 20–50)
LDLC SERPL CALC-MCNC: 83.8 MG/DL (ref 63–159)
NONHDLC SERPL-MCNC: 95 MG/DL
TRIGL SERPL-MCNC: 56 MG/DL (ref 30–150)

## 2023-02-18 PROCEDURE — 90833 PSYTX W PT W E/M 30 MIN: CPT | Mod: ,,, | Performed by: PSYCHIATRY & NEUROLOGY

## 2023-02-18 PROCEDURE — 25000003 PHARM REV CODE 250: Performed by: PSYCHIATRY & NEUROLOGY

## 2023-02-18 PROCEDURE — 99232 SBSQ HOSP IP/OBS MODERATE 35: CPT | Mod: ,,, | Performed by: PSYCHIATRY & NEUROLOGY

## 2023-02-18 PROCEDURE — 80061 LIPID PANEL: CPT | Performed by: PSYCHIATRY & NEUROLOGY

## 2023-02-18 PROCEDURE — 99232 PR SUBSEQUENT HOSPITAL CARE,LEVL II: ICD-10-PCS | Mod: ,,, | Performed by: PSYCHIATRY & NEUROLOGY

## 2023-02-18 PROCEDURE — 36415 COLL VENOUS BLD VENIPUNCTURE: CPT | Performed by: PSYCHIATRY & NEUROLOGY

## 2023-02-18 PROCEDURE — 90833 PR PSYCHOTHERAPY W/PATIENT W/E&M, 30 MIN (ADD ON): ICD-10-PCS | Mod: ,,, | Performed by: PSYCHIATRY & NEUROLOGY

## 2023-02-18 PROCEDURE — 11400000 HC PSYCH PRIVATE ROOM

## 2023-02-18 PROCEDURE — 83036 HEMOGLOBIN GLYCOSYLATED A1C: CPT | Performed by: PSYCHIATRY & NEUROLOGY

## 2023-02-18 RX ADMIN — THERA TABS 1 TABLET: TAB at 08:02

## 2023-02-18 RX ADMIN — RISPERIDONE 0.5 MG: 0.5 TABLET ORAL at 08:02

## 2023-02-18 RX ADMIN — BUPROPION HYDROCHLORIDE 150 MG: 150 TABLET, EXTENDED RELEASE ORAL at 08:02

## 2023-02-18 RX ADMIN — FOLIC ACID 1 MG: 1 TABLET ORAL at 08:02

## 2023-02-18 RX ADMIN — HYDROXYZINE HYDROCHLORIDE 50 MG: 25 TABLET, FILM COATED ORAL at 08:02

## 2023-02-18 NOTE — NURSING
Pt sleeping at this time, slept 10 hrs with no awakenings. NAD. Resp even and unlabored.Pathways clear,bed in low position. Q 15 min safety check ongoing.All precautions maintained.

## 2023-02-18 NOTE — PLAN OF CARE
Pt calm and cooperative, denies SI at this time, med compliant, appetite good, out on unit earlier interacting good with staff and peers, safety precautions maintained, will continue to monitor

## 2023-02-18 NOTE — PROGRESS NOTES
"  PSYCHIATRY DAILY INPATIENT PROGRESS NOTE  SUBSEQUENT HOSPITAL VISIT    ENCOUNTER DATE: 2/18/2023  SITE: [facility]    DATE OF ADMISSION: 2/17/2023  9:10 AM  LENGTH OF STAY: 1 days      CHIEF COMPLAINT   Price Godoy is a 28 y.o. male, seen during daily chamberlain rounds on the inpatient unit.  Price Godoy presented with the chief complaint of bizarre behavior, suicidal ideation, "I need help socially."       The patient was seen and examined. The chart was reviewed.     Reviewed notes from Rns, CTRS, RD, and NP and labs from the last 24 hours.    The patient's case was discussed with the treatment team/care providers today including Rns    Staff reports no behavioral or management issues.     The patient has been compliant with treatment.      Subjective 02/18/2023       Today the patient reports that he is doing "so so." He reports better sleep and appetite. He remains oddly related with possible psychosis, although he is now denying all psychiatric symptoms.   -His thought process is somewhat derailed.   -HIs I/J remains impaired- he was an unreliable historian   -he discussed his stressors, which are primarily housing and substance use.     He later talked about hearing "the holy one" guiding him.      The patient denies any side effects to medications.              Psychiatric ROS (observed, reported, or endorsed/denied):  Depressed mood - denies  Interest/pleasure/anhedonia: denies  Guilt/hopelessness/worthlessness - denies  Changes in Sleep - denies  Changes in Appetite - denies  Changes in Concentration - denies  Changes in Energy - denies  PMA/R- denies  Suicidal- active/passive ideations - denies  Homicidal ideations: active/passive ideations - denies    Hallucinations - denies  Delusions - denies  Disorganized behavior - denies  Disorganized speech - denies  Negative symptoms - denies    Elevated mood - denies  Decreased need for sleep - denies  Grandiosity - denies  Racing thoughts - " denies  Impulsivity - denies  Irritability- denies  Increased energy - denies  Distractibility - denies  Increase in goal-directed activity or PMA- denies    Symptoms of MAGGIE - denies  Symptoms of Panic Disorder- denies  Symptoms of PTSD - denies        Overall progress: Patient is showing no improvement on the Unit to date        Psychotherapy:  Target symptoms: substance abuse, mood disorder, psychosis  Why chosen therapy is appropriate versus another modality: relevant to diagnosis, patient responds to this modality, evidence based practice  Outcome monitoring methods: self-report, observation  Therapeutic intervention type: insight oriented psychotherapy, behavior modifying psychotherapy, supportive psychotherapy, interactive psychotherapy  Topics discussed/themes: building skills sets for symptom management, symptom recognition  The patient's response to the intervention is accepting. The patient's progress toward treatment goals is limited.   Duration of intervention: 16 minutes.        Medical ROS  General ROS: negative  Ophthalmic ROS: negative  ENT ROS: negative  Allergy and Immunology ROS: negative  Hematological and Lymphatic ROS: negative  Endocrine ROS: negative  Respiratory ROS: no cough, shortness of breath, or wheezing  Cardiovascular ROS: no chest pain or dyspnea on exertion  Gastrointestinal ROS: no abdominal pain, change in bowel habits, or black or bloody stools  Genito-Urinary ROS: no dysuria, trouble voiding, or hematuria  Musculoskeletal ROS: negative  Neurological ROS: no TIA or stroke symptoms  Dermatological ROS: negative      PAST MEDICAL HISTORY   Past Medical History:   Diagnosis Date    Anxiety     Depression     History of psychiatric hospitalization     Suicide attempt            PSYCHOTROPIC MEDICATIONS   Scheduled Meds:   buPROPion  150 mg Oral Daily    folic acid  1 mg Oral Daily    multivitamin  1 tablet Oral Daily    mupirocin   Nasal BID    nicotine  1 patch Transdermal Daily     "risperiDONE  0.5 mg Oral BID     Continuous Infusions:  PRN Meds:.hydrOXYzine HCL, OLANZapine **AND** OLANZapine, ondansetron, promethazine        EXAMINATION    VITALS   Vitals:    02/17/23 0956 02/17/23 1949 02/18/23 0800   BP: 135/74 136/65 (!) 140/74   BP Location:  Left arm Left arm   Patient Position:  Sitting Sitting   Pulse: 67 110 96   Resp: 16 18 18   Temp: 97.7 °F (36.5 °C) 98.4 °F (36.9 °C) 97.2 °F (36.2 °C)   TempSrc:  Temporal Temporal   Weight: 124.2 kg (273 lb 11.2 oz)     Height: 5' 11" (1.803 m)         Body mass index is 38.17 kg/m².        CONSTITUTIONAL  General Appearance: unremarkable, age appropriate     MUSCULOSKELETAL  Muscle Strength and Tone:not examined, no tremor, no tic  Abnormal Involuntary Movements: No  Gait and Station: non-ataxic     PSYCHIATRIC   Level of Consciousness: awake and alert   Orientation: person, place, situation, and time  Grooming: hospital karissa  Psychomotor Behavior: cooperative, redirectable, eye contact minimal  Speech: normal tone, normal rate, normal volume, soft  Language: grossly intact  Mood: "ok"  Affect: Flat  Thought Process: blocking and loose associations  Associations: loose at times   Thought Content: denies SI and denies HI  Perceptions: denies AH and denies  VH  Memory: Able to recall past events, Remote intact, and Recent intact  Attention:Easily distracted  Fund of Knowledge: Aware of current events and Vocabulary appropriate   Estimate if Intelligence:  Average based on work/education history, vocabulary and mental status exam  Insight: limited awareness of illness  Judgment: limited secondary to psychosis      DIAGNOSTIC TESTING   Laboratory Results  Recent Results (from the past 24 hour(s))   Lipid panel    Collection Time: 02/18/23  6:17 AM   Result Value Ref Range    Cholesterol 136 120 - 199 mg/dL    Triglycerides 56 30 - 150 mg/dL    HDL 41 40 - 75 mg/dL    LDL Cholesterol 83.8 63.0 - 159.0 mg/dL    HDL/Cholesterol Ratio 30.1 20.0 - 50.0 % "    Total Cholesterol/HDL Ratio 3.3 2.0 - 5.0    Non-HDL Cholesterol 95 mg/dL             MEDICAL DECISION MAKING      ASSESSMENT:   Schizophrneia, chronic with acute exacerbation  MDD, recurrent, severe with psychotic features  Unspecified Anxiety Disorder     Psychosocial stressors     Nicotine dependence  Polysubstance abuse        PROBLEM LIST AND MANAGEMENT PLANS     Psychosis: pt counseled  -resume Risperdal at 0.5 mg po BID- increase to 1 mg po BID     Depression: pt counseled  -resumed/continue Wellbutrin XL at 150 mg po q day     Anxiety: pt counseled  -start vistaril prn     Psychosocial stressors: pt counseled  -SW consulted to assist with resources     Nicotine dependence: pt counseled  -started/continue Nicotine 14 mg patch dermal q day     Polysubstance abuse: pt counseled          Discussed diagnosis, risks and benefits of proposed treatment vs alternative treatments vs no treatment, potential side effects of these treatments and the inherent unpredictability of treatment. The patient expresses understanding of the above and displays the capacity to agree with this treatment given said understanding. Patient also agrees that, currently, the benefits outweigh the risks and would like to pursue/continue treatment at this time.    Any medications being used off-label were discussed with the patient inclusive of the evidence base for the use of the medications and consent was obtained for the off-label use of the medication.       DISCHARGE PLANNING  Expected Disposition Plan: Home or Self Care      NEED FOR CONTINUED HOSPITALIZATION  Psychiatric illness continues to pose a potential threat to life or bodily function, of self or others, thereby requiring the need for continued inpatient psychiatric hospitalization: Yes, due to: significant psychotic thought disorder, danger to self, gravely disabled, and suicidal ideation, as evidenced by:  Ongoing concerns with SI., Ongoing concerns with command  hallucinations to hit/harm others., Ongoing concerns with grave disability with patient unable to perform basic feeding, hygiene and dressing activities without significant constant support., and Ongoing concerns with perceptual aberrancy and paranoid persecutory delusions leading to potential harm of self or others.    Protective inpatient pyschiatric hospitalization required while a safe disposition plan is enacted: Yes    Patient stabilized and ready for discharge from inpatient psychiatric unit: No        STAFF:   Ravin Alvarado MD  Psychiatry

## 2023-02-18 NOTE — PLAN OF CARE
Recommendations  1. Continue regular diet as tolerated.   2. If needed add Boost Plus daily or double portions.   3. Encourage good PO intake when needed.    Goals: Pt to continue to 100% PO intake of all meals.  Nutrition Goal Status: new  Communication of RD Recs:  (POC)

## 2023-02-18 NOTE — CONSULTS
"  [facility]  Adult Nutrition  Consult Note    SUMMARY     Recommendations  1. Continue regular diet as tolerated.   2. If needed add Boost Plus daily or double portions.   3. Encourage good PO intake when needed.    Goals: Pt to continue to 100% PO intake of all meals.  Nutrition Goal Status: new  Communication of RD Recs:  (POC)    Assessment and Plan    No nutrition dx at this time. Will continue to monitor.     Reason for Assessment    Reason For Assessment: consult  Diagnosis: psychological disorder  Relevant Medical History: depression, anxiety, hx of psych hospitalization, sucide attempt  Interdisciplinary Rounds: did not attend  General Information Comments: 2/18/23- RD consulted for new admit to psych/. Pt on regular diet. PO intake 100% since admit. No symptoms of anormia/ bulimia/binge eating. Will continue to monitor.    Nutrition Discharge Planning: Pt to follow a general healthful diet as tolerated.    Nutrition Risk Screen    Nutrition Risk Screen: no indicators present    Nutrition/Diet History    Factors Affecting Nutritional Intake: None identified at this time    Anthropometrics    Temp: 97.2 °F (36.2 °C)  Height: 5' 11" (180.3 cm)  Height (inches): 71 in  Weight Method: Standard Scale  Weight: 124.2 kg (273 lb 11.2 oz)  Weight (lb): 273.7 lb  Ideal Body Weight (IBW), Male: 172 lb  % Ideal Body Weight, Male (lb): 159.13 %  BMI (Calculated): 38.2  BMI Grade: 35 - 39.9 - obesity - grade II    Lab/Procedures/Meds    Pertinent Labs Reviewed: reviewed  Pertinent Labs Comments: H/H 12.3/37.0  Pertinent Medications Reviewed: reviewed  Pertinent Medications Comments: buropion, folic acid, MV, nicotine patch, risperidone    Estimated/Assessed Needs    Weight Used For Calorie Calculations: 124.2 kg (273 lb 13 oz)  Energy Calorie Requirements (kcal): 2200 kcal day (MSJ no AF)  Energy Need Method: [employer] [place]  Protein Requirements: 100-124 g day (0.8-1.0 g/kg protien)  Weight Used For " Protein Calculations: 124.2 kg (273 lb 13 oz)  Estimated Fluid Requirement Method: RDA Method  RDA Method (mL): 2200     Nutrition Prescription Ordered    Current Diet Order: regular    Evaluation of Received Nutrient/Fluid Intake    I/O: none  Energy Calories Required: meeting needs  Protein Required: meeting needs  % Intake of Estimated Energy Needs: 75 - 100 %  % Meal Intake: 75 - 100 %    Nutrition Risk    Level of Risk/Frequency of Follow-up: low     Monitor and Evaluation    Food and Nutrient Intake: food and beverage intake, energy intake  Food and Nutrient Adminstration: diet order  Knowledge/Beliefs/Attitudes: food and nutrition knowledge/skill  Physical Activity and Function: nutrition-related ADLs and IADLs  Anthropometric Measurements: weight, weight change, body mass index  Biochemical Data, Medical Tests and Procedures: glucose/endocrine profile  Nutrition-Focused Physical Findings: overall appearance     Nutrition Follow-Up    RD Follow-up?: Yes

## 2023-02-19 PROCEDURE — 99232 SBSQ HOSP IP/OBS MODERATE 35: CPT | Mod: ,,, | Performed by: PSYCHIATRY & NEUROLOGY

## 2023-02-19 PROCEDURE — 25000003 PHARM REV CODE 250: Performed by: PSYCHIATRY & NEUROLOGY

## 2023-02-19 PROCEDURE — 11400000 HC PSYCH PRIVATE ROOM

## 2023-02-19 PROCEDURE — 99232 PR SUBSEQUENT HOSPITAL CARE,LEVL II: ICD-10-PCS | Mod: ,,, | Performed by: PSYCHIATRY & NEUROLOGY

## 2023-02-19 RX ORDER — RISPERIDONE 0.5 MG/1
1 TABLET ORAL 2 TIMES DAILY
Status: DISCONTINUED | OUTPATIENT
Start: 2023-02-19 | End: 2023-02-20

## 2023-02-19 RX ADMIN — FOLIC ACID 1 MG: 1 TABLET ORAL at 08:02

## 2023-02-19 RX ADMIN — BUPROPION HYDROCHLORIDE 150 MG: 150 TABLET, EXTENDED RELEASE ORAL at 08:02

## 2023-02-19 RX ADMIN — THERA TABS 1 TABLET: TAB at 08:02

## 2023-02-19 RX ADMIN — RISPERIDONE 0.5 MG: 0.5 TABLET ORAL at 08:02

## 2023-02-19 NOTE — NURSING
Pt sleeping at this time, slept 8 hrs with no awakenings. NAD. Resp even and unlabored.Pathways clear,bed in low position. Q 15 min safety check ongoing.All precautions maintained.

## 2023-02-19 NOTE — PLAN OF CARE
Calm and cooperative, taking medication as ordered, vs stable, appetite good. Promoted safety plan, effective coping skills, mood improvement, absence of SI/HI, will continue to monitor safety.

## 2023-02-19 NOTE — PROGRESS NOTES
"  PSYCHIATRY DAILY INPATIENT PROGRESS NOTE  SUBSEQUENT HOSPITAL VISIT    ENCOUNTER DATE: 2/19/2023  SITE: [facility]    DATE OF ADMISSION: 2/17/2023  9:10 AM  LENGTH OF STAY: 2 days      CHIEF COMPLAINT   Price Godoy is a 28 y.o. male, seen during daily chamberlain rounds on the inpatient unit.  Price Godoy presented with the chief complaint of bizarre behavior, suicidal ideation, "I need help socially."       The patient was seen and examined. The chart was reviewed.     Reviewed notes from Rns, RD, and LPC and labs from the last 24 hours.    The patient's case was discussed with the treatment team/care providers today including Rns    Staff reports no behavioral or management issues.     The patient has been compliant with treatment.      Subjective 02/19/2023       Today the patient reports that he is doing "so so." He reports better sleep and appetite. He remains oddly related with possible psychosis, although he is now denying all psychiatric symptoms.   -His thought process is somewhat derailed.   -HIs I/J remains impaired- he was an unreliable historian   -he discussed his stressors, which are primarily housing and substance use.     He again talked about hearing "the holy one" guiding him. He dicussed his housing stressors. He is unsure when he last took his SANDY.      The patient denies any side effects to medications.              Psychiatric ROS (observed, reported, or endorsed/denied):  Depressed mood - denies  Interest/pleasure/anhedonia: denies  Guilt/hopelessness/worthlessness - denies  Changes in Sleep - denies  Changes in Appetite - denies  Changes in Concentration - denies  Changes in Energy - denies  PMA/R- denies  Suicidal- active/passive ideations - denies  Homicidal ideations: active/passive ideations - denies    Hallucinations - denies  Delusions - denies  Disorganized behavior - denies  Disorganized speech - denies  Negative symptoms - denies    Elevated mood - denies  Decreased " need for sleep - denies  Grandiosity - denies  Racing thoughts - denies  Impulsivity - denies  Irritability- denies  Increased energy - denies  Distractibility - denies  Increase in goal-directed activity or PMA- denies    Symptoms of MAGGIE - denies  Symptoms of Panic Disorder- denies  Symptoms of PTSD - denies        Overall progress: Patient is showing mild improvement         Psychotherapy:  Target symptoms: substance abuse, mood disorder, psychosis  Why chosen therapy is appropriate versus another modality: relevant to diagnosis, patient responds to this modality, evidence based practice  Outcome monitoring methods: self-report, observation  Therapeutic intervention type: insight oriented psychotherapy, behavior modifying psychotherapy, supportive psychotherapy, interactive psychotherapy  Topics discussed/themes: building skills sets for symptom management, symptom recognition  The patient's response to the intervention is accepting. The patient's progress toward treatment goals is limited.   Duration of intervention: 16 minutes.        Medical ROS  General ROS: negative  Ophthalmic ROS: negative  ENT ROS: negative  Allergy and Immunology ROS: negative  Hematological and Lymphatic ROS: negative  Endocrine ROS: negative  Respiratory ROS: no cough, shortness of breath, or wheezing  Cardiovascular ROS: no chest pain or dyspnea on exertion  Gastrointestinal ROS: no abdominal pain, change in bowel habits, or black or bloody stools  Genito-Urinary ROS: no dysuria, trouble voiding, or hematuria  Musculoskeletal ROS: negative  Neurological ROS: no TIA or stroke symptoms  Dermatological ROS: negative      PAST MEDICAL HISTORY   Past Medical History:   Diagnosis Date    Anxiety     Depression     History of psychiatric hospitalization     Suicide attempt            PSYCHOTROPIC MEDICATIONS   Scheduled Meds:   buPROPion  150 mg Oral Daily    folic acid  1 mg Oral Daily    multivitamin  1 tablet Oral Daily    mupirocin    "Nasal BID    nicotine  1 patch Transdermal Daily    risperiDONE  0.5 mg Oral BID     Continuous Infusions:  PRN Meds:.hydrOXYzine HCL, OLANZapine **AND** OLANZapine, ondansetron, promethazine        EXAMINATION    VITALS   Vitals:    02/18/23 0800 02/18/23 1953 02/19/23 0730 02/19/23 0800   BP: (!) 140/74 132/65 (!) 143/75    BP Location: Left arm Right arm     Patient Position: Sitting Sitting Sitting    Pulse: 96 89 89    Resp: 18 18 18    Temp: 97.2 °F (36.2 °C) 98.1 °F (36.7 °C) 98.2 °F (36.8 °C)    TempSrc: Temporal Temporal Temporal    Weight:    124.9 kg (275 lb 5.7 oz)   Height:           Body mass index is 38.4 kg/m².        CONSTITUTIONAL  General Appearance: unremarkable, age appropriate     MUSCULOSKELETAL  Muscle Strength and Tone:not examined, no tremor, no tic  Abnormal Involuntary Movements: No  Gait and Station: non-ataxic     PSYCHIATRIC   Level of Consciousness: awake and alert   Orientation: person, place, situation, and time  Grooming: hospital karissa  Psychomotor Behavior: cooperative, redirectable, eye contact minimal  Speech: normal tone, normal rate, normal volume, soft  Language: grossly intact  Mood: "ok"  Affect: Flat  Thought Process: blocking and loose associations  Associations: loose at times   Thought Content: denies SI and denies HI  Perceptions: denies AH and denies  VH  Memory: Able to recall past events, Remote intact, and Recent intact  Attention:Easily distracted  Fund of Knowledge: Aware of current events and Vocabulary appropriate   Estimate if Intelligence:  Average based on work/education history, vocabulary and mental status exam  Insight: limited awareness of illness  Judgment: limited secondary to psychosis      DIAGNOSTIC TESTING   Laboratory Results  No results found for this or any previous visit (from the past 24 hour(s)).            MEDICAL DECISION MAKING      ASSESSMENT:   Schizophrneia, chronic with acute exacerbation  MDD, recurrent, severe with psychotic " features  Unspecified Anxiety Disorder     Psychosocial stressors     Nicotine dependence  Polysubstance abuse        PROBLEM LIST AND MANAGEMENT PLANS     Psychosis: pt counseled  -resume Risperdal at 0.5 mg po BID- increase to 1 mg po BID- increase to 1/2 today then 2 mg po BID tomorrow     Depression: pt counseled  -resumed/continue Wellbutrin XL at 150 mg po q day     Anxiety: pt counseled  -start vistaril prn     Psychosocial stressors: pt counseled  -SW consulted to assist with resources     Nicotine dependence: pt counseled  -started/continue Nicotine 14 mg patch dermal q day     Polysubstance abuse: pt counseled          Discussed diagnosis, risks and benefits of proposed treatment vs alternative treatments vs no treatment, potential side effects of these treatments and the inherent unpredictability of treatment. The patient expresses understanding of the above and displays the capacity to agree with this treatment given said understanding. Patient also agrees that, currently, the benefits outweigh the risks and would like to pursue/continue treatment at this time.    Any medications being used off-label were discussed with the patient inclusive of the evidence base for the use of the medications and consent was obtained for the off-label use of the medication.       DISCHARGE PLANNING  Expected Disposition Plan: Home or Self Care      NEED FOR CONTINUED HOSPITALIZATION  Psychiatric illness continues to pose a potential threat to life or bodily function, of self or others, thereby requiring the need for continued inpatient psychiatric hospitalization: Yes, due to: significant psychotic thought disorder, danger to self, gravely disabled, and suicidal ideation, as evidenced by:  Ongoing concerns with SI., Ongoing concerns with command hallucinations to hit/harm others., Ongoing concerns with grave disability with patient unable to perform basic feeding, hygiene and dressing activities without significant  constant support., and Ongoing concerns with perceptual aberrancy and paranoid persecutory delusions leading to potential harm of self or others.    Protective inpatient pyschiatric hospitalization required while a safe disposition plan is enacted: Yes    Patient stabilized and ready for discharge from inpatient psychiatric unit: No        STAFF:   Ravin Alvarado MD  Psychiatry

## 2023-02-19 NOTE — PLAN OF CARE
Patient calm and cooperative. Mood improved with brighter affect. Denies A/V hallucinations,  Patient denies SI/HI no self harming behavior displayed, no aggressive behavior displayed towards others. Patient contracted safety with staff and unit.  Educated, reviewed  and discussed plan of care and medication regiment with this patient. Patient voiced understanding of all teachings

## 2023-02-19 NOTE — PLAN OF CARE
Problem: Adult Behavioral Health Plan of Care  Goal: Optimized Coping Skills in Response to Life Stressors  Intervention: Promote Effective Coping Strategies  Flowsheets (Taken 2/18/2023 2978)  Supportive Measures: active listening utilized   Group Note      Behavior    Patient attended group psychotherapy today. Patient exhibited depressed mood with guarded affect as the session began today. Patient improved significantly after an initial coping exercise.         Intervention     CBT-based psychotherapy today consisted of a group approach with focused attention on cognitive assessment using the question of what makes life worth living to assist the patient in promoting self-counter conditioning through the prompting and promoting of positive self talk.       Response    With significant initial prompting, patient stated that feeling loved and being aware of his surroundings from self observation helps him to feel more aware and to choose lacie. Patient received affirmation for his response.         Plan    To continue to assist the patient in choosing to use his thoughts over his feelings sometimes to help him manage his emotions.

## 2023-02-19 NOTE — PLAN OF CARE
Patient walking in halls, in dayroom at different times throughout the day, taking medication as ordered, vs stable ,appetite good. Promoted safety plan, effective coping skills, mood improvement, absence of SI/HI. Will continue to monitor safety.

## 2023-02-20 PROCEDURE — 25000003 PHARM REV CODE 250: Performed by: PSYCHIATRY & NEUROLOGY

## 2023-02-20 PROCEDURE — S4991 NICOTINE PATCH NONLEGEND: HCPCS | Performed by: PSYCHIATRY & NEUROLOGY

## 2023-02-20 PROCEDURE — 90833 PSYTX W PT W E/M 30 MIN: CPT | Mod: ,,, | Performed by: PSYCHIATRY & NEUROLOGY

## 2023-02-20 PROCEDURE — 99233 PR SUBSEQUENT HOSPITAL CARE,LEVL III: ICD-10-PCS | Mod: ,,, | Performed by: PSYCHIATRY & NEUROLOGY

## 2023-02-20 PROCEDURE — 90833 PR PSYCHOTHERAPY W/PATIENT W/E&M, 30 MIN (ADD ON): ICD-10-PCS | Mod: ,,, | Performed by: PSYCHIATRY & NEUROLOGY

## 2023-02-20 PROCEDURE — 11400000 HC PSYCH PRIVATE ROOM

## 2023-02-20 PROCEDURE — 99233 SBSQ HOSP IP/OBS HIGH 50: CPT | Mod: ,,, | Performed by: PSYCHIATRY & NEUROLOGY

## 2023-02-20 RX ORDER — BUPROPION HYDROCHLORIDE 300 MG/1
300 TABLET ORAL DAILY
Status: DISCONTINUED | OUTPATIENT
Start: 2023-02-21 | End: 2023-02-22

## 2023-02-20 RX ORDER — RISPERIDONE 2 MG/1
2 TABLET ORAL 2 TIMES DAILY
Status: DISCONTINUED | OUTPATIENT
Start: 2023-02-20 | End: 2023-02-21

## 2023-02-20 RX ADMIN — FOLIC ACID 1 MG: 1 TABLET ORAL at 08:02

## 2023-02-20 RX ADMIN — THERA TABS 1 TABLET: TAB at 08:02

## 2023-02-20 RX ADMIN — RISPERIDONE 1 MG: 0.5 TABLET ORAL at 08:02

## 2023-02-20 RX ADMIN — BUPROPION HYDROCHLORIDE 150 MG: 150 TABLET, EXTENDED RELEASE ORAL at 08:02

## 2023-02-20 RX ADMIN — RISPERIDONE 2 MG: 2 TABLET ORAL at 08:02

## 2023-02-20 RX ADMIN — NICOTINE 1 PATCH: 14 PATCH, EXTENDED RELEASE TRANSDERMAL at 08:02

## 2023-02-20 NOTE — PROGRESS NOTES
"   02/20/23 1040   [facility] Group Therapy   Group Name Therapeutic Recreation   Specific Interventions Cognitive Stimulation Training   Participation Level Minimal   Participation Quality Requires Prompting   Insight/Motivation Limited   Affect/Mood Display Flat   Cognition Alert   Psychomotor WNL     Patient presents flat, appears distracted, reports an "ok" mood. Patient sat observing quietly, minimal response when encouraged or assistance provided, at times responses weren't relating to questions asked.  "

## 2023-02-20 NOTE — PROGRESS NOTES
"  PSYCHIATRY DAILY INPATIENT PROGRESS NOTE  SUBSEQUENT HOSPITAL VISIT    ENCOUNTER DATE: 2/20/2023  SITE: [facility]    DATE OF ADMISSION: 2/17/2023  9:10 AM  LENGTH OF STAY: 3 days      CHIEF COMPLAINT   Price Godoy is a 28 y.o. male, seen during daily chamberlain rounds on the inpatient unit.  Price Godoy presented with the chief complaint of bizarre behavior, suicidal ideation, "I need help socially."       The patient was seen and examined. The chart was reviewed.     Reviewed notes from Rns and labs from the last 24 hours.    The patient's case was discussed with the treatment team/care providers today including Rns, CTRS, and LPC    Staff reports no behavioral or management issues.     The patient has been compliant with treatment.      Subjective 02/20/2023       Today the patient reports that he is doing "ok." He reports better sleep and appetite. He remains oddly related with possible psychosis, although he is now denying all psychiatric symptoms.   -His thought process is somewhat derailed. His responses to questions were often odd and/or inconsistent to what was asked  -HIs I/J remains impaired- he was an unreliable historian   -he discussed his stressors, which are primarily housing and substance use.     He recently  talked about hearing "the holy one" guiding him. He would not discuss his AH this AM.    He dicussed his housing stressors. He is unsure when he last took his SANDY.      The patient denies any side effects to medications.              Psychiatric ROS (observed, reported, or endorsed/denied):  Depressed mood - denies  Interest/pleasure/anhedonia: denies  Guilt/hopelessness/worthlessness - denies  Changes in Sleep - denies  Changes in Appetite - denies  Changes in Concentration - denies  Changes in Energy - denies  PMA/R- denies  Suicidal- active/passive ideations - denies  Homicidal ideations: active/passive ideations - denies    Hallucinations - denies  Delusions - " denies  Disorganized behavior - denies  Disorganized speech - denies  Negative symptoms - denies    Elevated mood - denies  Decreased need for sleep - denies  Grandiosity - denies  Racing thoughts - denies  Impulsivity - denies  Irritability- denies  Increased energy - denies  Distractibility - denies  Increase in goal-directed activity or PMA- denies    Symptoms of MAGGIE - denies  Symptoms of Panic Disorder- denies  Symptoms of PTSD - denies        Overall progress: Patient is showing mild improvement         Psychotherapy:  Target symptoms: substance abuse, mood disorder, psychosis  Why chosen therapy is appropriate versus another modality: relevant to diagnosis, patient responds to this modality, evidence based practice  Outcome monitoring methods: self-report, observation  Therapeutic intervention type: insight oriented psychotherapy, behavior modifying psychotherapy, supportive psychotherapy, interactive psychotherapy  Topics discussed/themes: building skills sets for symptom management, symptom recognition  The patient's response to the intervention is accepting. The patient's progress toward treatment goals is limited.   Duration of intervention: 16 minutes.        Medical ROS  General ROS: negative  Ophthalmic ROS: negative  ENT ROS: negative  Allergy and Immunology ROS: negative  Hematological and Lymphatic ROS: negative  Endocrine ROS: negative  Respiratory ROS: no cough, shortness of breath, or wheezing  Cardiovascular ROS: no chest pain or dyspnea on exertion  Gastrointestinal ROS: no abdominal pain, change in bowel habits, or black or bloody stools  Genito-Urinary ROS: no dysuria, trouble voiding, or hematuria  Musculoskeletal ROS: negative  Neurological ROS: no TIA or stroke symptoms  Dermatological ROS: negative      PAST MEDICAL HISTORY   Past Medical History:   Diagnosis Date    Anxiety     Depression     History of psychiatric hospitalization     Suicide attempt            PSYCHOTROPIC MEDICATIONS  "  Scheduled Meds:   buPROPion  150 mg Oral Daily    folic acid  1 mg Oral Daily    multivitamin  1 tablet Oral Daily    mupirocin   Nasal BID    nicotine  1 patch Transdermal Daily    risperiDONE  1 mg Oral BID     Continuous Infusions:  PRN Meds:.hydrOXYzine HCL, OLANZapine **AND** OLANZapine, ondansetron, promethazine        EXAMINATION    VITALS   Vitals:    02/19/23 0730 02/19/23 0800 02/19/23 1951 02/20/23 0730   BP: (!) 143/75  (!) 140/66 139/74   BP Location:   Right arm    Patient Position: Sitting  Sitting Sitting   Pulse: 89  83 75   Resp: 18  18 18   Temp: 98.2 °F (36.8 °C)  98.7 °F (37.1 °C) 97.9 °F (36.6 °C)   TempSrc: Temporal  Temporal Temporal   Weight:  124.9 kg (275 lb 5.7 oz)     Height:           Body mass index is 38.4 kg/m².        CONSTITUTIONAL  General Appearance: unremarkable, age appropriate     MUSCULOSKELETAL  Muscle Strength and Tone:not examined, no tremor, no tic  Abnormal Involuntary Movements: No  Gait and Station: non-ataxic     PSYCHIATRIC   Level of Consciousness: awake and alert   Orientation: person, place, situation, and time  Grooming: hospital karissa  Psychomotor Behavior: cooperative, redirectable, eye contact minimal  Speech: normal tone, normal rate, normal volume, soft  Language: grossly intact  Mood: "ok"  Affect: Flat  Thought Process: blocking and loose associations  Associations: loose at times   Thought Content: denies SI and denies HI  Perceptions: denies AH and denies  VH  Memory: Able to recall past events, Remote intact, and Recent intact  Attention:Easily distracted  Fund of Knowledge: Aware of current events and Vocabulary appropriate   Estimate if Intelligence:  Average based on work/education history, vocabulary and mental status exam  Insight: limited awareness of illness  Judgment: limited secondary to psychosis      DIAGNOSTIC TESTING   Laboratory Results  No results found for this or any previous visit (from the past 24 hour(s)).            MEDICAL DECISION " MAKING      ASSESSMENT:   Schizophrneia, chronic with acute exacerbation  MDD, recurrent, severe with psychotic features  Unspecified Anxiety Disorder     Psychosocial stressors     Nicotine dependence  Polysubstance abuse        PROBLEM LIST AND MANAGEMENT PLANS     Psychosis: pt counseled  -resume Risperdal at 0.5 mg po BID- increase to 1 mg po BID- increase  2 mg po BID today     Depression: pt counseled  -resumed Wellbutrin XL at 150 mg po q day- increase to 300 mg po q day tomorrow     Anxiety: pt counseled  -start vistaril prn     Psychosocial stressors: pt counseled  -SW consulted to assist with resources     Nicotine dependence: pt counseled  -started/continue Nicotine 14 mg patch dermal q day     Polysubstance abuse: pt counseled          Discussed diagnosis, risks and benefits of proposed treatment vs alternative treatments vs no treatment, potential side effects of these treatments and the inherent unpredictability of treatment. The patient expresses understanding of the above and displays the capacity to agree with this treatment given said understanding. Patient also agrees that, currently, the benefits outweigh the risks and would like to pursue/continue treatment at this time.    Any medications being used off-label were discussed with the patient inclusive of the evidence base for the use of the medications and consent was obtained for the off-label use of the medication.       DISCHARGE PLANNING  Expected Disposition Plan: Home or Self Care      NEED FOR CONTINUED HOSPITALIZATION  Psychiatric illness continues to pose a potential threat to life or bodily function, of self or others, thereby requiring the need for continued inpatient psychiatric hospitalization: Yes, due to: significant psychotic thought disorder, danger to self, gravely disabled, and suicidal ideation, as evidenced by:  Ongoing concerns with SI., Ongoing concerns with command hallucinations to hit/harm others., Ongoing concerns with  grave disability with patient unable to perform basic feeding, hygiene and dressing activities without significant constant support., and Ongoing concerns with perceptual aberrancy and paranoid persecutory delusions leading to potential harm of self or others.    Protective inpatient pyschiatric hospitalization required while a safe disposition plan is enacted: Yes    Patient stabilized and ready for discharge from inpatient psychiatric unit: No        STAFF:   Ravin Alvarado MD  Psychiatry

## 2023-02-20 NOTE — PLAN OF CARE
"Behavioral Health Unit  Psychosocial History and Assessment  Progress Note      Patient Name: Price Godoy YOB: 1995 SW: SHANNA ARCHULETA, [facility] Date: 2/20/2023    Chief Complaint: suicidal ideation and bizarre behavior    Consent:     Did the patient consent for an interview with the ? Yes    Did the patient consent for the  to contact family/friend/caregiver?   Yes  Name: María Godoy, Relationship: mother, and Contact: [phone]    Did the patient give consent for the  to inform family/friend/caregiver of his/her whereabouts or to discuss discharge planning? Yes    Source of Information: Face to face with patient and Telephone interview with family/friend/caregiver    Is information obtained from interviews considered reliable?   yes    Reason for Admission:     Active Hospital Problems    Diagnosis  POA    *Schizoaffective disorder, depressive type [F25.1]  Yes      Resolved Hospital Problems   No resolved problems to display.       History of Present Illness - (Patient Perception):   Patient reports he sleeps 8 hours a day. Patient further notes he "can't be a man, can't talk sometimes, like having trouble getting his thoughts out." Patient further notes "his thinking is slow," but he is not depressed. Patient states his medications were changed recently.     History of Present Illness - (Perception of Others):   Per Dr. Leroy Howard:     HISTORY    CHIEF COMPLAINT   Price Godoy is a 28 y.o. male with a past psychiatric history schizophrenia vs. Schizoaffective disorder  currently admitted to the inpatient unit with the following chief complaint:  bizarre behavior, suicidal ideation, "I need help socially."     HPI   The patient was seen and examined. The chart was reviewed.     The patient presented to the ER on 2/17/2023 . Per staff notes:  -Pt to ED via EMS for wandering the street and needing an ambulance. Pt has flat affect. States " when do " "I get to leave? I was just wandering". Pt denies SI or HI. Pt calm and cooperative-    Pt arrived via ems. Pt chief complaint is a psych eval. Per ems pt was found wandering streets found a  and stated needed an ambulance. Pt states he is schizophrenic and has been off meds   -Price KASIE Godoy is a 28 y.o male with a PMHx of anxiety, and depression, who presents to the ED via EMS for psychiatric evaluation tonight. History provided by an independent historian, EMS, who reports the patient was found wandering the streets by a  and requested an ambulance. Patient states he "is on the wrong medications, and wants medications to sleep more." Patient reports he sleeps 8 hours a day. Patient further notes he "can't be a man, can't talk sometimes, like having trouble getting his thoughts out." Patient further notes "his thinking is slow," but he is not depressed. Patient states his medications were changed recently. Patient reports nausea. No medications taken PTA. No alleviating or exacerbating factors noted. Denies headache, visual and auditory hallucinations, fever, or other associated symptoms. Patient reports he is currently homeless. Denies EtOH, or other recreational drug usage. Allergic to amoxicillin.  -Today on exam patient seen lying in bed looking disheveled.  Patient reports that I walk to the hospital patient states that he did this because I did not have any hope upon further enquiring what he needs by this patient endorses suicidal ideations increased hopelessness helplessness anhedonia and lack of motivation.  On exam patient seems to have increased thought blocking increased delayed responses to questions and some potential responding to internal stimuli.  Patient denied any acute psychotic symptoms denied any auditory visual hallucinations or paranoia patient reports that he is homeless and does not know how to get help with his mood and has been feeling suicidal about it.  Issue " "unable to provide any collateral information patient did report having act team however unsure if he has been following up with them regularly due to patient being homeless.  Patient also admitted missing his monthly long-acting depot shot he last received this last month during his inpatient hospitalization  -Per [facility]- "appears slow and slightly catatonic. Asking for help with meds." On admit Pt states he is here "so you can get me a house, apartment, or even a studio. I never had one of those. I dont have a mom or dad." States has been off meds for a long time. Appears to be RIS, slowed thought processes, and delayed responses.Denies SI/HI/AH/VH     The patient was medically cleared and admitted to the [facility].     Provided to the medical student: Patient has an extensive history of psychiatric hospitalizations. Most recently leaving [facility] 1 month ago. Patient states, "I'm just looking for a home, just been lost for a while." Patient could not elaborate further as he was easily distracted with extensive thought blocking. He states that the abilify was working for him but would prefer daily medication instead of the long acting injection. Patient states this due to, "wanting freedom to feel himself sometimes." He states he doesn't have any support system, transportation, or money to support himself. He has most recently been in [facility]. Patient was pushed to provide more information but repeatedly gave one word answers and changed his answers numerous times.     During the assessment, the patient was a limited historian and unreliable. He denied all symptoms, then endorsed depression. He has negative symptoms of psychosis, with noted RIS. He is homeless. -he has a long history of hospitalizations and medication noncomplaince.          Symptoms of Depression: diminished mood - Yes, loss of interest/anhedonia - Yes;  recurrent - Yes, >14 days - Yes, diminished energy - Yes, change in sleep - No, change " in appetite - No, diminished concentration or cognition or indecisiveness - Yes, PMA/R -  No, excessive guilt or hopelessness or worthlessness - Yes, suicidal ideations - Yes     Changes in Sleep: trouble with initiation- No, maintenance, - No early morning awakening with inability to return to sleep - No, hypersomnolence - No     Suicidal- active/passive ideations - yes, organized plans, future intentions - No     Homicidal ideations: active/passive ideations - No, organized plans, future intentions - No        Substance/s:  Taken in larger amounts or over longer periods than intended: No,  Persistent desire or unsuccessful attempts to cut down or stop: No,  Great deal of time spent seeking, using or recovering from: No,  Craving or strong desire to use: No,  Recurrent use despite failure to meet major role obligation: No,  Continued use despite persistent or recurrent social/interparsonal issues due to use: No,  Important social/work/recreational activities given up due to use: No,  Recurrent use in physically hazardous situations: No,  Continued use despite knowledge of persistent physical or psychological problem: No,  Tolerance (either increased need or diminished effect): No     Psychotherapy:  Target symptoms: depression, psychosis  Why chosen therapy is appropriate versus another modality: relevant to diagnosis, patient responds to this modality, evidence based practice  Outcome monitoring methods: self-report, observation  Therapeutic intervention type: insight oriented psychotherapy, behavior modifying psychotherapy, supportive psychotherapy, interactive psychotherapy  Topics discussed/themes: building skills sets for symptom management, symptom recognition  The patient's response to the intervention is accepting. The patient's progress toward treatment goals is limited.   Duration of intervention: 18 minutes.        PAST PSYCHIATRIC HISTORY  Previous Psychiatric Hospitalizations: Yes  Previous SI/HI:  "Yes,  Previous Suicide Attempts: No,   Previous Medication Trials: Yes,  Psychiatric Care (current & past): No,  History of Psychotherapy: Yes,  History of Violence: No,  History of sexual/physical abuse: No,  SOCIAL HISTORY:  Developmental/Childhood:Achieved all developmental milestones timely  Education: Some college   Employment Status/Finances:Unemployed   Relationship Status/Sexual Orientation: single  Children: 0  Housing Status: Homeless    history:  NO   Access to Firearms: NO ;  Locked up? n/a  Zoroastrian:Non-spiritual  Recreational activities: none      SUBSTANCE ABUSE HISTORY   Recreational Drugs: ecstasy and marijuana   Use of Alcohol: minimal use  Rehab History:yes   Tobacco Use:yes     LEGAL HISTORY:   Past charges/incarcerations: NO  Pending charges:NO     FAMILY PSYCHIATRIC HISTORY         Family History   Problem Relation Age of Onset    Mental illness Brother      Schizophrenia Maternal Uncle      Diabetes Maternal Grandmother      Diabetes Paternal Grandmother     PSYCHIATRIC   Level of Consciousness: awake and alert   Orientation: person, place, situation, and time  Grooming: hospital [facility]  Psychomotor Behavior: cooperative, redirectable, eye contact minimal  Speech: normal tone, normal rate, normal volume, soft  Language: grossly intact  Mood: "ok"  Affect: Flat  Thought Process: blocking and loose associations  Associations: loose at times   Thought Content: denies SI and denies HI  Perceptions: denies AH and denies  VH  Memory: Able to recall past events, Remote intact, and Recent intact  Attention:Easily distracted  Fund of Knowledge: Aware of current events and Vocabulary appropriate   Estimate if Intelligence:  Average based on work/education history, vocabulary and mental status exam  Insight: limited awareness of illness  Judgment: limited secondary to psychosis     PSYCHOSOCIAL     PSYCHOSOCIAL STRESSORS   family, financial, health, and occupational     FUNCTIONING RELATIONSHIPS "   alone & isolated     STRENGTHS AND LIABILITIES   Strength: Patient accepts guidance/feedback, Strength: Patient is expressive/articulate., Liability: Patient is unstable., Liability: Patient lacks coping skills.     Is the patient aware of the biomedical complications associated with substance abuse and mental illness? yes     Does the patient have an Advance Directive for Mental Health treatment? no  (If yes, inform patient to bring copy.)           MEDICAL DECISION MAKING          ASSESSMENT         Schizophrneia, chronic with acute exacerbation  MDD, recurrent, severe with psychotic features  Unspecified Anxiety Disorder     Psychosocial stressors     Nicotine dependence  Polysubstance abuse        PROBLEM LIST AND MANAGEMENT PLANS     Psychosis: pt counseled  -resume Risperdal at 0.5 mg po BID     Depression: pt counseled  -resume Wellbutrin XL at 150 mg po q day     Anxiety: pt counseled  -start vistaril prn     Psychosocial stressors: pt counseled  -SW consulted to assist with resources     Nicotine dependence: pt counseled  -start Nicotine 14 mg patch dermal q day     Polysubstance abuse: pt counseled       PRESCRIPTION DRUG MANAGEMENT  Compliance: no  Side Effects: no  Regimen Adjustments: see above     Discussed diagnosis, risks and benefits of proposed treatment vs alternative treatments vs no treatment, potential side effects of these treatments and the inherent unpredictability of treatment. The patient expresses understanding of the above and displays the capacity to agree with this treatment given said understanding. Patient also agrees that, currently, the benefits outweigh the risks and would like to pursue/continue treatment at this time.     Any medications being used off-label were discussed with the patient inclusive of the evidence base for the use of the medications and consent was obtained for the off-label use of the medication.            DIAGNOSTIC TESTING  Labs reviewed with patient;  "follow up pending labs     Disposition:  -Will attempt to obtain outside psychiatric records if available  -SW to assist with aftercare planning and collateral  -Once stable discharge home with outpatient follow up care and/or rehab  -Continue inpatient treatment under a [facility] and/or [facility] for danger to self/ danger to others/grave disability as evident by significant psychotic thought disorder, aggressive neuroleptic titration, danger to others, gravely disabled, suicidal ideation, and severe obstacles to placement    Present biopsychosocial functioning:   Pt is a 28 year old male with chief complaints of suicidal ideations and bizarre behaviors.Patient could not elaborate further as he was easily distracted with extensive thought blocking. He states that the abilify was working for him but would prefer daily medication instead of the long acting injection. Patient states this due to, "wanting freedom to feel himself sometimes." He states he doesn't have any support system, transportation, or money to support himself. He has most recently been in [facility]. Patient was pushed to provide more information but repeatedly gave one word answers and changed his answers numerous times.    Past biopsychosocial functioning:   Previous Psychiatric Hospitalizations at [facility] 12/29/2022,05/29/2022,03/15/2021, 07/25/2019and [facility] 12/18/2020,09/12/2019. Pt has also previous suicide attempt by slitting wrist 6-7 years ago.     Family and Marital/Relationship History:     Significant Other/Partner Relationships:  Single:      Family Relationships: Strained      Childhood History:     Where was patient raised? [place]    Who raised the patient?  Biological father      How does patient describe their childhood?  Pt states it was good.      Who is patient's primary support person?  María Michael/mother      Culture and Caodaism:     Caodaism: Rastafarian    How strong of a role does Confucianist and spirituality play in patient's life? " "  Spiritual without formal affiliations    Hinduism or spiritual concerns regarding treatment: not applicable     History of Abuse:   History of Abuse: Denies      Outcome: N/A    Psychiatric and Medical History:     History of psychiatric illness or treatment: prior inpatient treatment, prior suicide attempt(s), and has participated in counseling/psychotherapy on an outpatient basis in the past    Medical history:   Past Medical History:   Diagnosis Date    Anxiety     Depression     History of psychiatric hospitalization     Suicide attempt        Substance Abuse History:     Alcohol - (Patient Perspective):   Social History     Substance and Sexual Activity   Alcohol Use Yes    Alcohol/week: 1.0 standard drink    Types: 1 Cans of beer per week    Comment: rarely       Alcohol - (Collateral Perspective):   Per chart review pt does endorse in alcohol 1 can of beer on special occassions    Drugs - (Patient Perspective):   Social History     Substance and Sexual Activity   Drug Use Yes    Types: Marijuana, "Crack" cocaine    Comment: last use tonight       Drugs - (Collateral Perspective):   Per chart review pt did endorse in marijuana use on December of 2022.     Additional Comments: n/a    Education:     Currently Enrolled? No  Attended College/Technical School    Special Education? No    Interested in Completing Education/GED: No    Employment and Financial:     Currently employed?  unemployed    Source of Income:  Pt stets his parents provide for him    Able to afford basic needs (food, shelter, utilities)? Pt states his parents provide for him    Who manages finances/personal affairs?   Pt stets he handles his own money      Service:     ? no    Combat Service? No     Community Resources:     Describe present use of community resources: [facility], medicaid     Identify previously used community resources   (Include previous mental health treatment - outpatient and inpatient):  [facility], " [facility]    Environmental:     Current living situation:Lives with family    Social Evaluation:     Patient Assets: General fund of knowledge, Communicable skills, and Special hobby/interest    Patient Limitations: mental illness    High risk psychosocial issues that may impact discharge planning:   homeless    Recommendations:     Anticipated discharge plan:   outpatient follow up: [facility]    High risk issues requiring early treatment planning and immediate intervention:  Mental illness    Community resources needed for discharge planning:  aftercare treatment sources    Anticipated social work role(s) in treatment and discharge planning:   [facility] will encourage pt to attend all groups and to assist pt with aftercare planning.

## 2023-02-20 NOTE — PLAN OF CARE
[facility] discussed with pt on collateral consent. Pt signed collateral consent for  María Grover/mother [phone]. [facility] attemtped to contact collateral consent to discuss pt admission. María stated to call her back in 5 minutes due to she was driving. [facility] attempted to call pt mother back, but there was no reply. [facility] could not leave any voice mail at this time. [facility] will attempt at a later time and date.

## 2023-02-20 NOTE — PLAN OF CARE
Problem: Adult Behavioral Health Plan of Care  Goal: Rounds/Family Conference  Outcome: Ongoing, Progressing  Flowsheets (Taken 2/20/2023 1025)  Participants:   psychiatrist   nursing   therapeutic recreation   other ([facility] and med students)    TREATMENT TEAM      Chief Complaint:      Pt is a 28 year old male with chief complaints of bizarre behavior, suicidal ideations.    Pt states he quit smoking cigarettes and to get help with depression  Pt Goal(s):    Pt states he does not know what his plans are for when he leaves the hospital.    Current Progress:   Pt attended treatment team dressed in blue scrubs.     Pt affect flat/ dysphoric mood    Pt states he can not find his mother,but did talk to her yesterday. Pt states he talked to her about the mess and drama. Pt thoughts process are derailed. Responses to questions were often inconsistent to what was asked.    Pt states he was hearing voices talking to him about fighting and the AH were making comments about him fighting and  talked about hearing the holy one.   Pt states he is having the voices under control.   Pt states he would like to go to a homeless shelter in [place] and wants to move away from his mother due to she is right about him.   Pt is an unreliable historian.     Program/groups:    Encourage pt to continue to attend all groups.       Revisions to Plan:    Encourage pt to attend treatment team and to attend all groups. Recommendations are for pt to attend aftercare treatment with the act team in [place], [place].  MEDICATIONS:  Risperdal - 0.5 mg po BID- increase to 1 mg po BID- increase  2 mg po BID today  Wellbutrin XL at 150 mg po q day- increase to 300 mg po q day tomorrow.  vistaril prn  Nicotine 14 mg patch dermal q day

## 2023-02-20 NOTE — PLAN OF CARE
Pt calm with flat affect. Pt refused medications. Reports he does not need them tonight. VS  stable. Denies A/V hallucinations. Patient denies SI/HI no self harming behavior displayed, no aggressive behavior displayed towards others. Patient contracted safety with staff and unit.  Educated, reviewed  and discussed plan of care and medication regiment with this patient. Patient voiced understanding of all teachings

## 2023-02-20 NOTE — MEDICAL/APP STUDENT
"PSYCHIATRY DAILY INPATIENT PROGRESS NOTE  SUBSEQUENT HOSPITAL VISIT    ENCOUNTER DATE: 2/20/2023  [facility]    DATE OF ADMISSION: 2/17/2023  9:10 AM  LENGTH OF STAY: 3 days      CHIEF COMPLAINT   Price Godoy is a 28 y.o. male, seen during daily chamberlain rounds on the inpatient unit.  Price Godoy presented with the chief complaint of  Bizarre behavior, suicidal ideation, "I need help socially."      The patient was seen and examined. The chart was reviewed.     Reviewed notes from Rns and LPC and labs from the last 24 hours.    The patient's case was discussed with the treatment team/care providers today including Rns    Staff reports no behavioral or management issues.     The patient has been compliant with treatment.      Subjective 02/20/2023       Today the patient reports he is doing well. Patent continued to have significant thought blocking this morning which limited the interview. He feels his depression and anxiety are about the same, but continued to smile when talking about his down and depressed feelings. Patient seemed to have some psychomotor agitation during the interview. Patient denies SI/HI. He endorsed "seeing people" sometimes but none at the moment. Due to thought blocking it was difficult to get a full history on the content and frequency of the VH the patient is experiencing.       The patient denies any side effects to medications.        Interim/overnight events per report/notes:          Psychiatric ROS (observed, reported, or endorsed/denied):  Depressed mood - Continuing  Interest/pleasure/anhedonia: Continuing  Guilt/hopelessness/worthlessness - Continuing  Changes in Sleep - Continuing  Changes in Appetite - Continuing  Changes in Concentration - Continuing  Changes in Energy - Continuing  PMA/R- Yes  Suicidal- active/passive ideations - No  Homicidal ideations: active/passive ideations - No    Hallucinations - Continuing  Delusions - No  Disorganized behavior - " "Continuing  Disorganized speech - Continuing  Negative symptoms - No    Elevated mood - No  Decreased need for sleep - No  Grandiosity - No  Racing thoughts - No  Impulsivity - No  Irritability- No  Increased energy - No  Distractibility - No  Increase in goal-directed activity or PMA- No    Symptoms of MAGGIE - Continuing  Symptoms of Panic Disorder- No  Symptoms of PTSD - No        Overall progress: Patient is showing mild improvement           Medical ROS  ROS      PAST MEDICAL HISTORY   Past Medical History:   Diagnosis Date    Anxiety     Depression     History of psychiatric hospitalization     Suicide attempt            PSYCHOTROPIC MEDICATIONS   Scheduled Meds:   buPROPion  150 mg Oral Daily    folic acid  1 mg Oral Daily    multivitamin  1 tablet Oral Daily    mupirocin   Nasal BID    nicotine  1 patch Transdermal Daily    risperiDONE  1 mg Oral BID     Continuous Infusions:  PRN Meds:.hydrOXYzine HCL, OLANZapine **AND** OLANZapine, ondansetron, promethazine        EXAMINATION    VITALS   Vitals:    02/19/23 0730 02/19/23 0800 02/19/23 1951 02/20/23 0730   BP: (!) 143/75  (!) 140/66 139/74   BP Location:   Right arm    Patient Position: Sitting  Sitting Sitting   Pulse: 89  83 75   Resp: 18  18 18   Temp: 98.2 °F (36.8 °C)  98.7 °F (37.1 °C) 97.9 °F (36.6 °C)   TempSrc: Temporal  Temporal Temporal   Weight:  124.9 kg (275 lb 5.7 oz)     Height:           Body mass index is 38.4 kg/m².        CONSTITUTIONAL  General Appearance: unremarkable, age appropriate    MUSCULOSKELETAL  Muscle Strength and Tone:not examined, no tremor, no tic  Abnormal Involuntary Movements: No  Gait and Station: non-ataxic    PSYCHIATRIC   Level of Consciousness: awake and alert   Orientation: person, place, and situation  Grooming: Casually dressed and Well groomed  Psychomotor Behavior: cooperative, psychomotor agitation, redirectable  Speech: normal pitch, normal volume, delayed, soft  Language: grossly intact  Mood: "Ok"  Affect: " Incongruent with mood  Thought Process: blocking  Associations: intact   Thought Content: denies SI and denies HI  Perceptions: +VH and denies AH  Memory: Able to recall past events, Remote intact, and Recent intact  Attention:Impaired to some degree  Fund of Knowledge: Aware of current events and Vocabulary appropriate   Estimate if Intelligence:  Average based on work/education history, vocabulary and mental status exam  Insight: limited awareness of illness  Judgment: behavior is adequate to circumstances        DIAGNOSTIC TESTING   Laboratory Results  No results found for this or any previous visit (from the past 24 hour(s)).          MEDICAL DECISION MAKING      ASSESSMENT:   Schizophrneia, chronic with acute exacerbation  MDD, recurrent, severe with psychotic features  Unspecified Anxiety Disorder     Psychosocial stressors     Nicotine dependence  Polysubstance abuse        PROBLEM LIST AND MANAGEMENT PLANS                  Discussed diagnosis, risks and benefits of proposed treatment vs alternative treatments vs no treatment, potential side effects of these treatments and the inherent unpredictability of treatment. The patient expresses understanding of the above and displays the capacity to agree with this treatment given said understanding. Patient also agrees that, currently, the benefits outweigh the risks and would like to pursue/continue treatment at this time.    Any medications being used off-label were discussed with the patient inclusive of the evidence base for the use of the medications and consent was obtained for the off-label use of the medication.       DISCHARGE PLANNING  Expected Disposition Plan: {Select Anticipated Discharge Plan:95932}      NEED FOR CONTINUED HOSPITALIZATION  Psychiatric illness continues to pose a potential threat to life or bodily function, of self or others, thereby requiring the need for continued inpatient psychiatric hospitalization: Yes, due to: {PSY IP  JUSTIFICATION FOR CONTINUED ACUTE CARE HOSPITALIZATION:69241}, as evidenced by:  {just:84149}    Protective inpatient pyschiatric hospitalization required while a safe disposition plan is enacted: Yes    Patient stabilized and ready for discharge from inpatient psychiatric unit: No        STAFF:   Reji Stanton

## 2023-02-21 PROCEDURE — 90833 PR PSYCHOTHERAPY W/PATIENT W/E&M, 30 MIN (ADD ON): ICD-10-PCS | Mod: ,,, | Performed by: PSYCHIATRY & NEUROLOGY

## 2023-02-21 PROCEDURE — 99233 SBSQ HOSP IP/OBS HIGH 50: CPT | Mod: ,,, | Performed by: PSYCHIATRY & NEUROLOGY

## 2023-02-21 PROCEDURE — 25000003 PHARM REV CODE 250: Performed by: PSYCHIATRY & NEUROLOGY

## 2023-02-21 PROCEDURE — 11400000 HC PSYCH PRIVATE ROOM

## 2023-02-21 PROCEDURE — 90833 PSYTX W PT W E/M 30 MIN: CPT | Mod: ,,, | Performed by: PSYCHIATRY & NEUROLOGY

## 2023-02-21 PROCEDURE — 99233 PR SUBSEQUENT HOSPITAL CARE,LEVL III: ICD-10-PCS | Mod: ,,, | Performed by: PSYCHIATRY & NEUROLOGY

## 2023-02-21 RX ORDER — RISPERIDONE 3 MG/1
3 TABLET ORAL 2 TIMES DAILY
Status: DISCONTINUED | OUTPATIENT
Start: 2023-02-21 | End: 2023-02-24

## 2023-02-21 RX ADMIN — THERA TABS 1 TABLET: TAB at 01:02

## 2023-02-21 RX ADMIN — FOLIC ACID 1 MG: 1 TABLET ORAL at 01:02

## 2023-02-21 RX ADMIN — RISPERIDONE 3 MG: 3 TABLET, FILM COATED ORAL at 08:02

## 2023-02-21 RX ADMIN — BUPROPION HYDROCHLORIDE 300 MG: 300 TABLET, FILM COATED, EXTENDED RELEASE ORAL at 01:02

## 2023-02-21 RX ADMIN — HYDROXYZINE HYDROCHLORIDE 50 MG: 25 TABLET, FILM COATED ORAL at 02:02

## 2023-02-21 NOTE — NURSING
"Pt up to nurses station telling writer it is nice to meet him.  Asked pt if he did not remember meeting before.  Pt says he does and "Hey handsome"   Later says "you are beautiful."  Elevated.  Pt asked if he did take his morning meds.    Told pt he did not.  Pt asked if he could take now.  Meds were given at this time.    "

## 2023-02-21 NOTE — PLAN OF CARE
Pt calm and cooperative, out on unit interacting good with staff and peers, pt showered, med compliant, appetite good, responding to internal stimuli, denies SI at this time, poverty of thought, safety precautions maintained, will continue to monitor

## 2023-02-21 NOTE — PLAN OF CARE
Currently on phone, loud, elevated.  Speaking Bengali to his family.  Got staff to get phone number to [facility] in [facility]. Wanted staff to call his dad.  Informed pt that his dad would have to call here with ID number for us to acknowledge him.  Pt voiced understanding.

## 2023-02-21 NOTE — NURSING
Pt is sleeping at this time and has slept 5 hrs. Resp even & unlabored. Pathways clear. Q 15 minute safety checks ongoing. All precautions maintained.

## 2023-02-21 NOTE — PROGRESS NOTES
"  PSYCHIATRY DAILY INPATIENT PROGRESS NOTE  SUBSEQUENT HOSPITAL VISIT    ENCOUNTER DATE: 2/21/2023  SITE: [facility]    DATE OF ADMISSION: 2/17/2023  9:10 AM  LENGTH OF STAY: 4 days      CHIEF COMPLAINT   Price Godoy is a 28 y.o. male, seen during daily chamberlain rounds on the inpatient unit.  Price Godoy presented with the chief complaint of bizarre behavior, suicidal ideation, "I need help socially."       The patient was seen and examined. The chart was reviewed.     Reviewed notes from Rns, CTRS, and LPC and labs from the last 24 hours.    The patient's case was discussed with the treatment team/care providers today including Rns    Staff reports no behavioral or management issues.     The patient has been compliant with treatment.      Subjective 02/21/2023       Today the patient again reports that he is doing "good." He reports poor sleep last night but good appetite. He remains oddly related with possible psychosis, although he is now denying all psychiatric symptoms. He responded "I'm good" to most questions.  -His thought process is somewhat derailed. His responses to questions were often odd and/or inconsistent to what was asked  -HIs I/J remains impaired- he was an unreliable historian   -he discussed his stressors, which are primarily housing and substance use.     He recently  talked about hearing "the holy one" guiding him. He would not discuss his AH this AM.    He dicussed his housing stressors. He is unsure when he last took his SANDY. He has an uncertain discharge disposition at this time.       The patient denies any side effects to medications.              Psychiatric ROS (observed, reported, or endorsed/denied):  Depressed mood - denies  Interest/pleasure/anhedonia: denies  Guilt/hopelessness/worthlessness - denies  Changes in Sleep - denies  Changes in Appetite - denies  Changes in Concentration - denies  Changes in Energy - denies  PMA/R- denies  Suicidal- active/passive ideations " - denies  Homicidal ideations: active/passive ideations - denies    Hallucinations - denies  Delusions - denies  Disorganized behavior - denies  Disorganized speech - denies  Negative symptoms - denies    Elevated mood - denies  Decreased need for sleep - denies  Grandiosity - denies  Racing thoughts - denies  Impulsivity - denies  Irritability- denies  Increased energy - denies  Distractibility - denies  Increase in goal-directed activity or PMA- denies    Symptoms of MAGGIE - denies  Symptoms of Panic Disorder- denies  Symptoms of PTSD - denies        Overall progress: Patient is showing mild improvement         Psychotherapy:  Target symptoms: substance abuse, mood disorder, psychosis  Why chosen therapy is appropriate versus another modality: relevant to diagnosis, patient responds to this modality, evidence based practice  Outcome monitoring methods: self-report, observation  Therapeutic intervention type: insight oriented psychotherapy, behavior modifying psychotherapy, supportive psychotherapy, interactive psychotherapy  Topics discussed/themes: building skills sets for symptom management, symptom recognition  The patient's response to the intervention is accepting. The patient's progress toward treatment goals is limited.   Duration of intervention: 16 minutes.        Medical ROS  General ROS: negative  Ophthalmic ROS: negative  ENT ROS: negative  Allergy and Immunology ROS: negative  Hematological and Lymphatic ROS: negative  Endocrine ROS: negative  Respiratory ROS: no cough, shortness of breath, or wheezing  Cardiovascular ROS: no chest pain or dyspnea on exertion  Gastrointestinal ROS: no abdominal pain, change in bowel habits, or black or bloody stools  Genito-Urinary ROS: no dysuria, trouble voiding, or hematuria  Musculoskeletal ROS: negative  Neurological ROS: no TIA or stroke symptoms  Dermatological ROS: negative      PAST MEDICAL HISTORY   Past Medical History:   Diagnosis Date    Anxiety      "Depression     History of psychiatric hospitalization 09/12/2019    [facility] 12/29/2022,05/29/2022,03/15/2021, 07/25/2019and [facility] 12/18/2020,09/12/2019.    Suicide attempt            PSYCHOTROPIC MEDICATIONS   Scheduled Meds:   buPROPion  300 mg Oral Daily    folic acid  1 mg Oral Daily    multivitamin  1 tablet Oral Daily    mupirocin   Nasal BID    nicotine  1 patch Transdermal Daily    risperiDONE  3 mg Oral BID     Continuous Infusions:  PRN Meds:.hydrOXYzine HCL, OLANZapine **AND** OLANZapine, ondansetron, promethazine        EXAMINATION    VITALS   Vitals:    02/19/23 1951 02/20/23 0730 02/20/23 1948 02/21/23 0737   BP: (!) 140/66 139/74 (!) 140/64 (!) 120/57   BP Location: Right arm  Left arm    Patient Position: Sitting Sitting Sitting    Pulse: 83 75 97 73   Resp: 18 18 18 18   Temp: 98.7 °F (37.1 °C) 97.9 °F (36.6 °C) 97.7 °F (36.5 °C) 97.7 °F (36.5 °C)   TempSrc: Temporal Temporal Temporal    Weight:       Height:           Body mass index is 38.4 kg/m².        CONSTITUTIONAL  General Appearance: unremarkable, age appropriate     MUSCULOSKELETAL  Muscle Strength and Tone:not examined, no tremor, no tic  Abnormal Involuntary Movements: No  Gait and Station: non-ataxic     PSYCHIATRIC   Level of Consciousness: awake and alert   Orientation: person, place, situation, and time  Grooming: hospital [facility]  Psychomotor Behavior: cooperative, redirectable, eye contact minimal  Speech: normal tone, normal rate, normal volume, soft  Language: grossly intact  Mood: "ok"  Affect: Flat  Thought Process: blocking and loose associations  Associations: loose at times   Thought Content: denies SI and denies HI  Perceptions: denies AH and denies  VH  Memory: Able to recall past events, Remote intact, and Recent intact  Attention:Easily distracted  Fund of Knowledge: Aware of current events and Vocabulary appropriate   Estimate if Intelligence:  Average based on work/education history, vocabulary and mental status exam  Insight: " limited awareness of illness  Judgment: limited secondary to psychosis      DIAGNOSTIC TESTING   Laboratory Results  No results found for this or any previous visit (from the past 24 hour(s)).            MEDICAL DECISION MAKING      ASSESSMENT:   Schizophrneia, chronic with acute exacerbation  MDD, recurrent, severe with psychotic features  Unspecified Anxiety Disorder     Psychosocial stressors     Nicotine dependence  Polysubstance abuse        PROBLEM LIST AND MANAGEMENT PLANS     Psychosis: pt counseled  -resume Risperdal at 0.5 mg po BID- increase to 1 mg po BID- increase  2 mg po BID- increase to 2/3 today then 3 mg po BID tomorrow     Depression: pt counseled  -resumed Wellbutrin XL at 150 mg po q day- increase to 300 mg po q day today     Anxiety: pt counseled  -start vistaril prn     Psychosocial stressors: pt counseled  -SW consulted to assist with resources     Nicotine dependence: pt counseled  -started/continue Nicotine 14 mg patch dermal q day     Polysubstance abuse: pt counseled          Discussed diagnosis, risks and benefits of proposed treatment vs alternative treatments vs no treatment, potential side effects of these treatments and the inherent unpredictability of treatment. The patient expresses understanding of the above and displays the capacity to agree with this treatment given said understanding. Patient also agrees that, currently, the benefits outweigh the risks and would like to pursue/continue treatment at this time.    Any medications being used off-label were discussed with the patient inclusive of the evidence base for the use of the medications and consent was obtained for the off-label use of the medication.       DISCHARGE PLANNING  Expected Disposition Plan: Home or Self Care      NEED FOR CONTINUED HOSPITALIZATION  Psychiatric illness continues to pose a potential threat to life or bodily function, of self or others, thereby requiring the need for continued inpatient  psychiatric hospitalization: Yes, due to: significant psychotic thought disorder, danger to self, gravely disabled, and suicidal ideation, as evidenced by:  Ongoing concerns with SI., Ongoing concerns with command hallucinations to hit/harm others., Ongoing concerns with grave disability with patient unable to perform basic feeding, hygiene and dressing activities without significant constant support., and Ongoing concerns with perceptual aberrancy and paranoid persecutory delusions leading to potential harm of self or others.    Protective inpatient pyschiatric hospitalization required while a safe disposition plan is enacted: Yes    Patient stabilized and ready for discharge from inpatient psychiatric unit: No        STAFF:   Ravin Alvarado MD  Psychiatry

## 2023-02-21 NOTE — NURSING
Pt out for breakfast.  Appeared tired.  Said he slept good.  Ate breakfast.  Blunted, agitated affect.  Refused morning meds even with much redirection.  Returned to bed sitting up staring.  Did answer staff saying he is good.  Denies SI/HI/hallucinations.  Refused meds again.  No distress noted.  Resting in bed.  Will monitor for safety.

## 2023-02-22 PROCEDURE — 25000003 PHARM REV CODE 250: Performed by: PSYCHIATRY & NEUROLOGY

## 2023-02-22 PROCEDURE — S4991 NICOTINE PATCH NONLEGEND: HCPCS | Performed by: PSYCHIATRY & NEUROLOGY

## 2023-02-22 PROCEDURE — 99233 SBSQ HOSP IP/OBS HIGH 50: CPT | Mod: ,,, | Performed by: PSYCHIATRY & NEUROLOGY

## 2023-02-22 PROCEDURE — 90833 PSYTX W PT W E/M 30 MIN: CPT | Mod: ,,, | Performed by: PSYCHIATRY & NEUROLOGY

## 2023-02-22 PROCEDURE — 99233 PR SUBSEQUENT HOSPITAL CARE,LEVL III: ICD-10-PCS | Mod: ,,, | Performed by: PSYCHIATRY & NEUROLOGY

## 2023-02-22 PROCEDURE — 90833 PR PSYCHOTHERAPY W/PATIENT W/E&M, 30 MIN (ADD ON): ICD-10-PCS | Mod: ,,, | Performed by: PSYCHIATRY & NEUROLOGY

## 2023-02-22 PROCEDURE — 11400000 HC PSYCH PRIVATE ROOM

## 2023-02-22 RX ADMIN — THERA TABS 1 TABLET: TAB at 08:02

## 2023-02-22 RX ADMIN — RISPERIDONE 3 MG: 3 TABLET, FILM COATED ORAL at 08:02

## 2023-02-22 RX ADMIN — FOLIC ACID 1 MG: 1 TABLET ORAL at 08:02

## 2023-02-22 RX ADMIN — BUPROPION HYDROCHLORIDE 300 MG: 300 TABLET, FILM COATED, EXTENDED RELEASE ORAL at 08:02

## 2023-02-22 RX ADMIN — NICOTINE 1 PATCH: 14 PATCH, EXTENDED RELEASE TRANSDERMAL at 08:02

## 2023-02-22 NOTE — PLAN OF CARE
"POC ongoing. At nurses station often asking for same things over and over again. Staff splitting. On phone during phone times. Accepting meds although states they are "fake pills".   "

## 2023-02-22 NOTE — NURSING
Pt is sleeping at this time and has slept 6 hrs. Resp even & unlabored. Pathways clear. Q 15 minute safety checks ongoing. All precautions maintained.

## 2023-02-22 NOTE — PLAN OF CARE
Pt calm with flat affect. Pt compliant with medications. VS  stable. Denies A/V hallucinations. Patient denies SI/HI no self harming behavior displayed, no aggressive behavior displayed towards others. Patient contracted safety with staff and unit.  Educated, reviewed  and discussed plan of care and medication regiment with this patient. Patient voiced understanding of all teachings

## 2023-02-22 NOTE — PROGRESS NOTES
"  PSYCHIATRY DAILY INPATIENT PROGRESS NOTE  SUBSEQUENT HOSPITAL VISIT    ENCOUNTER DATE: 2/22/2023  SITE: [facility]    DATE OF ADMISSION: 2/17/2023  9:10 AM  LENGTH OF STAY: 5 days      CHIEF COMPLAINT   Price Godoy is a 28 y.o. male, seen during daily chamberlain rounds on the inpatient unit.  Pirce Godoy presented with the chief complaint of bizarre behavior, suicidal ideation, "I need help socially."       The patient was seen and examined. The chart was reviewed.     Reviewed notes from Rns, [facility]S, and [facility] and labs from the last 24 hours.    The patient's case was discussed with the treatment team/care providers today including Rns    Staff reports no behavioral or management issues.     The patient has been compliant with treatment.      Subjective 02/22/2023       Today the patient again reports that he is doing "good.. I talked to my gyspsy grandmother today." He reports "ok" sleep last night but good appetite. He remains oddly related with possible psychosis, although he is now denying all psychiatric symptoms. He responded "I'm good" to most questions.There is a strong suspicion of PDD  -His thought process is somewhat derailed. His responses to questions were often odd and/or inconsistent to what was asked  -HIs I/J remains impaired- he was an unreliable historian   -he discussed his stressors, which are primarily housing and substance use.     He recently  talked about hearing "the holy one" guiding him. He would not discuss his AH this AM.+RIS noted during the interview.    He dicussed his housing stressors. He is unsure when he last took his SANDY. He has an uncertain discharge disposition at this time.     There is concern for possible activation form wellbutrin.       The patient denies any side effects to medications.      Psychiatric ROS (observed, reported, or endorsed/denied):  Depressed mood - denies  Interest/pleasure/anhedonia: denies  Guilt/hopelessness/worthlessness - denies  Changes " in Sleep - denies  Changes in Appetite - denies  Changes in Concentration - denies  Changes in Energy - denies  PMA/R- denies  Suicidal- active/passive ideations - denies  Homicidal ideations: active/passive ideations - denies    Hallucinations - denies  Delusions - denies  Disorganized behavior - denies  Disorganized speech - denies  Negative symptoms - denies    Elevated mood - denies  Decreased need for sleep - denies  Grandiosity - denies  Racing thoughts - denies  Impulsivity - denies  Irritability- denies  Increased energy - denies  Distractibility - denies  Increase in goal-directed activity or PMA- denies    Symptoms of MAGGIE - denies  Symptoms of Panic Disorder- denies  Symptoms of PTSD - denies        Overall progress: Patient is showing mild improvement         Psychotherapy:  Target symptoms: substance abuse, mood disorder, psychosis  Why chosen therapy is appropriate versus another modality: relevant to diagnosis, patient responds to this modality, evidence based practice  Outcome monitoring methods: self-report, observation  Therapeutic intervention type: insight oriented psychotherapy, behavior modifying psychotherapy, supportive psychotherapy, interactive psychotherapy  Topics discussed/themes: building skills sets for symptom management, symptom recognition  The patient's response to the intervention is accepting. The patient's progress toward treatment goals is limited.   Duration of intervention: 16 minutes.        Medical ROS  General ROS: negative  Ophthalmic ROS: negative  ENT ROS: negative  Allergy and Immunology ROS: negative  Hematological and Lymphatic ROS: negative  Endocrine ROS: negative  Respiratory ROS: no cough, shortness of breath, or wheezing  Cardiovascular ROS: no chest pain or dyspnea on exertion  Gastrointestinal ROS: no abdominal pain, change in bowel habits, or black or bloody stools  Genito-Urinary ROS: no dysuria, trouble voiding, or hematuria  Musculoskeletal ROS:  "negative  Neurological ROS: no TIA or stroke symptoms  Dermatological ROS: negative      PAST MEDICAL HISTORY   Past Medical History:   Diagnosis Date    Anxiety     Depression     History of psychiatric hospitalization 09/12/2019    [facility] 12/29/2022,05/29/2022,03/15/2021, 07/25/2019and [facility] 12/18/2020,09/12/2019.    Suicide attempt            PSYCHOTROPIC MEDICATIONS   Scheduled Meds:   buPROPion  300 mg Oral Daily    folic acid  1 mg Oral Daily    multivitamin  1 tablet Oral Daily    nicotine  1 patch Transdermal Daily    risperiDONE  3 mg Oral BID     Continuous Infusions:  PRN Meds:.hydrOXYzine HCL, OLANZapine **AND** OLANZapine, ondansetron, promethazine        EXAMINATION    VITALS   Vitals:    02/21/23 0737 02/21/23 1923 02/22/23 0756 02/22/23 0832   BP: (!) 120/57 (!) 128/58 (!) 150/68    BP Location:  Left arm Right arm    Patient Position:  Sitting Sitting    Pulse: 73 106 77    Resp: 18 18 18    Temp: 97.7 °F (36.5 °C) 98.8 °F (37.1 °C) 97.3 °F (36.3 °C)    TempSrc:  Temporal Temporal    Weight:    123 kg (271 lb 0.9 oz)   Height:           Body mass index is 37.8 kg/m².        CONSTITUTIONAL  General Appearance: unremarkable, age appropriate     MUSCULOSKELETAL  Muscle Strength and Tone:not examined, no tremor, no tic  Abnormal Involuntary Movements: No  Gait and Station: non-ataxic     PSYCHIATRIC   Level of Consciousness: awake and alert   Orientation: person, place, situation, and time  Grooming: hospital [facility]  Psychomotor Behavior: cooperative, redirectable, eye contact minimal  Speech: normal tone, normal rate, normal volume, soft  Language: grossly intact  Mood: "ok"  Affect: Flat  Thought Process: blocking and loose associations  Associations: loose at times   Thought Content: denies SI and denies HI  Perceptions: denies AH and denies  VH  Memory: Able to recall past events, Remote intact, and Recent intact  Attention:Easily distracted  Fund of Knowledge: Aware of current events and Vocabulary " appropriate   Estimate if Intelligence:  Average based on work/education history, vocabulary and mental status exam  Insight: limited awareness of illness  Judgment: limited secondary to psychosis      DIAGNOSTIC TESTING   Laboratory Results  No results found for this or any previous visit (from the past 24 hour(s)).            MEDICAL DECISION MAKING      ASSESSMENT:   Schizophrneia, chronic with acute exacerbation  MDD, recurrent, severe with psychotic features  Unspecified Anxiety Disorder     Psychosocial stressors     Nicotine dependence  Polysubstance abuse        PROBLEM LIST AND MANAGEMENT PLANS     Psychosis: pt counseled  -resume Risperdal at 0.5 mg po BID- increase to 1 mg po BID- increase  2 mg po BID- increase to 2/3- increase then 3 mg po BID today    He reports that he received a SANDY during the last hospitalization; Risperdal 120 mg sers SANDY given on about about 1/5/23 and due again on 2/5/23; he then reports that he started a new shot since his discharge form that facility  -still seeking records from his [facility]     Depression: pt counseled  -resumed Wellbutrin XL at 150 mg po q day- increase to/continue at 300 mg po q day - will stop; pt appears t be getting activated with hypomania     Anxiety: pt counseled  -start vistaril prn     Psychosocial stressors: pt counseled  -SW consulted to assist with resources     Nicotine dependence: pt counseled  -started/continue Nicotine 14 mg patch dermal q day     Polysubstance abuse: pt counseled          Discussed diagnosis, risks and benefits of proposed treatment vs alternative treatments vs no treatment, potential side effects of these treatments and the inherent unpredictability of treatment. The patient expresses understanding of the above and displays the capacity to agree with this treatment given said understanding. Patient also agrees that, currently, the benefits outweigh the risks and would like to pursue/continue treatment at this time.    Any  medications being used off-label were discussed with the patient inclusive of the evidence base for the use of the medications and consent was obtained for the off-label use of the medication.       DISCHARGE PLANNING  Expected Disposition Plan: Home or Self Care      NEED FOR CONTINUED HOSPITALIZATION  Psychiatric illness continues to pose a potential threat to life or bodily function, of self or others, thereby requiring the need for continued inpatient psychiatric hospitalization: Yes, due to: significant psychotic thought disorder, danger to self, gravely disabled, and suicidal ideation, as evidenced by:  Ongoing concerns with SI., Ongoing concerns with command hallucinations to hit/harm others., Ongoing concerns with grave disability with patient unable to perform basic feeding, hygiene and dressing activities without significant constant support., and Ongoing concerns with perceptual aberrancy and paranoid persecutory delusions leading to potential harm of self or others.    Protective inpatient pyschiatric hospitalization required while a safe disposition plan is enacted: Yes    Patient stabilized and ready for discharge from inpatient psychiatric unit: No        STAFF:   Ravin Alvarado MD  Psychiatry

## 2023-02-22 NOTE — PROGRESS NOTES
"   02/22/23 1110   [facility] Group Therapy   Group Name Leisure Education   Specific Interventions Coping Skills Training   Participation Level Minimal   Participation Quality Requires Prompting   Insight/Motivation Limited   Affect/Mood Display Flat   Cognition Alert   Psychomotor WNL     Patient presents flat, slow to respond, reports "good" mood, reports he is feeling better since he is taking his medicine. Patient was selective in which questions he'd answer, "can you go to the next question." Patient reports he doesn't like being around people, "I couldn't quiet participate in group because you were on." Patient shared interest and states he hasn't participated in these activities lately: sketching, bike riding, swimming and singing in Buddhist.  "

## 2023-02-22 NOTE — MEDICAL/APP STUDENT
"PSYCHIATRY DAILY INPATIENT PROGRESS NOTE  SUBSEQUENT HOSPITAL VISIT    ENCOUNTER DATE: 2/22/2023  SITE: [facility]    DATE OF ADMISSION: 2/17/2023  9:10 AM  LENGTH OF STAY: 5 days      CHIEF COMPLAINT   Price Godoy is a 28 y.o. male, seen during daily chamberlain rounds on the inpatient unit.  Price Godoy presented with the chief complaint of  bizarre behavior, suicidal ideation, "I need help socially."       The patient was seen and examined. The chart was reviewed.     Reviewed notes from Rns and labs from the last 24 hours.    The patient's case was discussed with the treatment team/care providers today including Rns    Staff reports no behavioral or management issues.     The patient has been compliant with treatment.      Subjective 02/22/2023    Today the patient reports he is doing "good." Patient continues to exhibit a full and happy affect throughout interview and daily interaction. He denies AH, but endorses that he saw his mother this morning at breakfast for which she, "showed me my world." More information could not be clarified due to tangential thought and derailment from the topic numerous times. Patient endorsed that his sleep last night was, "decent" but that is secondary to "this is a scary hospital." Patient then continued on saying, "I like women, I like special women, women that are special to me." Interview was redirected towards review of systems with difficulty. Patient is concerned about when he is supposed to receive his SANDY as he believes he is overdue.       The patient denies any side effects to medications.        Interim/overnight events per report/notes:          Psychiatric ROS (observed, reported, or endorsed/denied):  Depressed mood - No  Interest/pleasure/anhedonia: No  Guilt/hopelessness/worthlessness - No  Changes in Sleep - Continuing  Changes in Appetite - No  Changes in Concentration - Continuing  Changes in Energy - No  PMA/R- No  Suicidal- active/passive " ideations - No  Homicidal ideations: active/passive ideations - No    Hallucinations - Continuing  Delusions - No  Disorganized behavior - No  Disorganized speech - Continuing  Negative symptoms - No    Elevated mood - No  Decreased need for sleep - No  Grandiosity - No  Racing thoughts - Yes  Impulsivity - No  Irritability- No  Increased energy - No  Distractibility - No  Increase in goal-directed activity or PMA- No    Symptoms of MAGGIE - No  Symptoms of Panic Disorder- No  Symptoms of PTSD - No        Overall progress: {PSY - IMPROVEMENT:35163}          Medical ROS  ROS      PAST MEDICAL HISTORY   Past Medical History:   Diagnosis Date    Anxiety     Depression     History of psychiatric hospitalization 09/12/2019    [facility] 12/29/2022,05/29/2022,03/15/2021, 07/25/2019and [facility] 12/18/2020,09/12/2019.    Suicide attempt            PSYCHOTROPIC MEDICATIONS   Scheduled Meds:   buPROPion  300 mg Oral Daily    folic acid  1 mg Oral Daily    multivitamin  1 tablet Oral Daily    mupirocin   Nasal BID    nicotine  1 patch Transdermal Daily    risperiDONE  3 mg Oral BID     Continuous Infusions:  PRN Meds:.hydrOXYzine HCL, OLANZapine **AND** OLANZapine, ondansetron, promethazine        EXAMINATION    VITALS   Vitals:    02/21/23 0737 02/21/23 1923 02/22/23 0756 02/22/23 0832   BP: (!) 120/57 (!) 128/58 (!) 150/68    BP Location:  Left arm Right arm    Patient Position:  Sitting Sitting    Pulse: 73 106 77    Resp: 18 18 18    Temp: 97.7 °F (36.5 °C) 98.8 °F (37.1 °C) 97.3 °F (36.3 °C)    TempSrc:  Temporal Temporal    Weight:    123 kg (271 lb 0.9 oz)   Height:           Body mass index is 37.8 kg/m².        CONSTITUTIONAL  General Appearance: unremarkable, age appropriate    MUSCULOSKELETAL  Muscle Strength and Tone:not examined, no tremor, no tic  Abnormal Involuntary Movements: No  Gait and Station: non-ataxic    PSYCHIATRIC   Level of Consciousness: awake and alert   Orientation: person, place, and situation  Grooming:  Casually dressed and Well groomed  Psychomotor Behavior: cooperative, restless and fidgety , eye contact minimal  Speech: normal tone, normal rate, normal pitch, normal volume  Language: grossly intact  Mood: great  Affect: Consistent with mood and Inappropriate  Thought Process: tangential and loose associations  Associations: tangential, loose   Thought Content: denies SI and denies HI  Perceptions: +VH and denies AH  Memory: Able to recall past events, Remote intact, and Recent intact  Attention:Impaired to some degree  Fund of Knowledge: Aware of current events and Vocabulary appropriate   Estimate if Intelligence:  Average based on work/education history, vocabulary and mental status exam  Insight: has awareness of illness  Judgment: behavior is adequate to circumstances        DIAGNOSTIC TESTING   Laboratory Results  No results found for this or any previous visit (from the past 24 hour(s)).          MEDICAL DECISION MAKING      ASSESSMENT:   Schizophrneia, chronic with acute exacerbation  MDD, recurrent, severe with psychotic features  Unspecified Anxiety Disorder     Psychosocial stressors     Nicotine dependence  Polysubstance abuse           PROBLEM LIST AND MANAGEMENT PLANS    ***            Discussed diagnosis, risks and benefits of proposed treatment vs alternative treatments vs no treatment, potential side effects of these treatments and the inherent unpredictability of treatment. The patient expresses understanding of the above and displays the capacity to agree with this treatment given said understanding. Patient also agrees that, currently, the benefits outweigh the risks and would like to pursue/continue treatment at this time.    Any medications being used off-label were discussed with the patient inclusive of the evidence base for the use of the medications and consent was obtained for the off-label use of the medication.       DISCHARGE PLANNING  Expected Disposition Plan: {Select Anticipated  Discharge Plan:03217}      NEED FOR CONTINUED HOSPITALIZATION  Psychiatric illness continues to pose a potential threat to life or bodily function, of self or others, thereby requiring the need for continued inpatient psychiatric hospitalization: Yes, due to: {PSY IP JUSTIFICATION FOR CONTINUED ACUTE CARE HOSPITALIZATION:06548}, as evidenced by:  {just:54360}    Protective inpatient pyschiatric hospitalization required while a safe disposition plan is enacted: Yes    Patient stabilized and ready for discharge from inpatient psychiatric unit: No        STAFF:   Reji Stanton

## 2023-02-23 PROCEDURE — 11400000 HC PSYCH PRIVATE ROOM

## 2023-02-23 PROCEDURE — 99233 PR SUBSEQUENT HOSPITAL CARE,LEVL III: ICD-10-PCS | Mod: ,,, | Performed by: PSYCHIATRY & NEUROLOGY

## 2023-02-23 PROCEDURE — 90833 PSYTX W PT W E/M 30 MIN: CPT | Mod: ,,, | Performed by: PSYCHIATRY & NEUROLOGY

## 2023-02-23 PROCEDURE — 25000003 PHARM REV CODE 250: Performed by: PSYCHIATRY & NEUROLOGY

## 2023-02-23 PROCEDURE — S4991 NICOTINE PATCH NONLEGEND: HCPCS | Performed by: PSYCHIATRY & NEUROLOGY

## 2023-02-23 PROCEDURE — 90833 PR PSYCHOTHERAPY W/PATIENT W/E&M, 30 MIN (ADD ON): ICD-10-PCS | Mod: ,,, | Performed by: PSYCHIATRY & NEUROLOGY

## 2023-02-23 PROCEDURE — 99233 SBSQ HOSP IP/OBS HIGH 50: CPT | Mod: ,,, | Performed by: PSYCHIATRY & NEUROLOGY

## 2023-02-23 RX ADMIN — OLANZAPINE 10 MG: 10 TABLET, FILM COATED ORAL at 02:02

## 2023-02-23 RX ADMIN — HYDROXYZINE HYDROCHLORIDE 50 MG: 25 TABLET, FILM COATED ORAL at 10:02

## 2023-02-23 RX ADMIN — RISPERIDONE 3 MG: 3 TABLET, FILM COATED ORAL at 08:02

## 2023-02-23 RX ADMIN — HYDROXYZINE HYDROCHLORIDE 50 MG: 25 TABLET, FILM COATED ORAL at 08:02

## 2023-02-23 RX ADMIN — NICOTINE 1 PATCH: 14 PATCH, EXTENDED RELEASE TRANSDERMAL at 08:02

## 2023-02-23 NOTE — PROGRESS NOTES
"  PSYCHIATRY DAILY INPATIENT PROGRESS NOTE  SUBSEQUENT HOSPITAL VISIT    ENCOUNTER DATE: 2/23/2023  SITE: [facility]    DATE OF ADMISSION: 2/17/2023  9:10 AM  LENGTH OF STAY: 6 days      CHIEF COMPLAINT   Price Godoy is a 28 y.o. male, seen during daily chamberlain rounds on the inpatient unit.  Price Godoy presented with the chief complaint of bizarre behavior, suicidal ideation, "I need help socially."       The patient was seen and examined. The chart was reviewed.     Reviewed notes from Rns, CTRS, and LPC and labs from the last 24 hours.    The patient's case was discussed with the treatment team/care providers today including Rns    Staff reports no behavioral or management issues.     The patient has been compliant with treatment.      Subjective 02/23/2023       Today the patient again reports that he is doing "good." He reports "ok" sleep last night but good appetite. He remains oddly related with possible psychosis, although he is now denying all psychiatric symptoms. He responded "I'm good" to most questions.There is a strong suspicion of PDD  -His thought process remains somewhat derailed. His responses to questions were often odd and/or inconsistent to what was asked  -HIs I/J remains impaired- he was an unreliable historian   -he discussed his stressors, which are primarily housing and substance use.     He recently  talked about hearing "the holy one" guiding him. He would not discuss his AH this AM.+RIS noted during the interview.    He dicussed his housing stressors. He is unsure when he last took his SANDY. He has an uncertain discharge disposition at this time. Collateral is being sought from the ACT team    There is concern for possible activation form wellbutrin- he appears less hypomanic this AM (first day the Wellbutrin was withheld).       The patient denies any side effects to medications.      Psychiatric ROS (observed, reported, or endorsed/denied):  Depressed mood - " denies  Interest/pleasure/anhedonia: denies  Guilt/hopelessness/worthlessness - denies  Changes in Sleep - denies  Changes in Appetite - denies  Changes in Concentration - denies  Changes in Energy - denies  PMA/R- denies  Suicidal- active/passive ideations - denies  Homicidal ideations: active/passive ideations - denies    Hallucinations - denies  Delusions - denies  Disorganized behavior - denies  Disorganized speech - denies  Negative symptoms - denies    Elevated mood - denies  Decreased need for sleep - denies  Grandiosity - denies  Racing thoughts - denies  Impulsivity - denies  Irritability- denies  Increased energy - denies  Distractibility - denies  Increase in goal-directed activity or PMA- denies    Symptoms of MAGGIE - denies  Symptoms of Panic Disorder- denies  Symptoms of PTSD - denies        Overall progress: Patient is showing mild improvement         Psychotherapy:  Target symptoms: substance abuse, mood disorder, psychosis  Why chosen therapy is appropriate versus another modality: relevant to diagnosis, patient responds to this modality, evidence based practice  Outcome monitoring methods: self-report, observation  Therapeutic intervention type: insight oriented psychotherapy, behavior modifying psychotherapy, supportive psychotherapy, interactive psychotherapy  Topics discussed/themes: building skills sets for symptom management, symptom recognition  The patient's response to the intervention is accepting. The patient's progress toward treatment goals is limited.   Duration of intervention: 16 minutes.        Medical ROS  General ROS: negative  Ophthalmic ROS: negative  ENT ROS: negative  Allergy and Immunology ROS: negative  Hematological and Lymphatic ROS: negative  Endocrine ROS: negative  Respiratory ROS: no cough, shortness of breath, or wheezing  Cardiovascular ROS: no chest pain or dyspnea on exertion  Gastrointestinal ROS: no abdominal pain, change in bowel habits, or black or bloody  "stools  Genito-Urinary ROS: no dysuria, trouble voiding, or hematuria  Musculoskeletal ROS: negative  Neurological ROS: no TIA or stroke symptoms  Dermatological ROS: negative      PAST MEDICAL HISTORY   Past Medical History:   Diagnosis Date    Anxiety     Depression     History of psychiatric hospitalization 09/12/2019    [facility] 12/29/2022,05/29/2022,03/15/2021, 07/25/2019and [facility] 12/18/2020,09/12/2019.    Suicide attempt            PSYCHOTROPIC MEDICATIONS   Scheduled Meds:   folic acid  1 mg Oral Daily    multivitamin  1 tablet Oral Daily    nicotine  1 patch Transdermal Daily    risperiDONE  3 mg Oral BID     Continuous Infusions:  PRN Meds:.hydrOXYzine HCL, OLANZapine **AND** OLANZapine, ondansetron, promethazine        EXAMINATION    VITALS   Vitals:    02/22/23 0756 02/22/23 0832 02/22/23 1912 02/23/23 0845   BP: (!) 150/68  128/63 (!) 141/71   BP Location: Right arm  Left arm Right arm   Patient Position: Sitting  Sitting Sitting   Pulse: 77  91 85   Resp: 18  18 18   Temp: 97.3 °F (36.3 °C)  97.8 °F (36.6 °C) 97 °F (36.1 °C)   TempSrc: Temporal   Temporal   Weight:  123 kg (271 lb 0.9 oz)     Height:           Body mass index is 37.8 kg/m².        CONSTITUTIONAL  General Appearance: unremarkable, age appropriate     MUSCULOSKELETAL  Muscle Strength and Tone:not examined, no tremor, no tic  Abnormal Involuntary Movements: No  Gait and Station: non-ataxic     PSYCHIATRIC   Level of Consciousness: awake and alert   Orientation: person, place, situation, and time  Grooming: hospital karissa  Psychomotor Behavior: cooperative, redirectable, eye contact minimal  Speech: normal tone, normal rate, normal volume, soft  Language: grossly intact  Mood: "ok"  Affect: Flat  Thought Process: blocking and loose associations  Associations: loose at times   Thought Content: denies SI and denies HI  Perceptions: denies AH and denies  VH  Memory: Able to recall past events, Remote intact, and Recent intact  Attention:Easily " distracted  Fund of Knowledge: Aware of current events and Vocabulary appropriate   Estimate if Intelligence:  Average based on work/education history, vocabulary and mental status exam  Insight: limited awareness of illness  Judgment: limited secondary to psychosis      DIAGNOSTIC TESTING   Laboratory Results  No results found for this or any previous visit (from the past 24 hour(s)).            MEDICAL DECISION MAKING      ASSESSMENT:   Schizophrneia, chronic with acute exacerbation  MDD, recurrent, severe with psychotic features  Unspecified Anxiety Disorder     Psychosocial stressors     Nicotine dependence  Polysubstance abuse        PROBLEM LIST AND MANAGEMENT PLANS     Psychosis: pt counseled  -resume Risperdal at 0.5 mg po BID- increase to 1 mg po BID- increase  2 mg po BID- increase to 2/3- increase to 3 mg po BID today    He reports that he received a SANDY during the last hospitalization; Risperdal 120 mg sers SANDY given on about about 1/5/23 and due again on 2/5/23; he then reports that he started a new shot since his discharge form that facility  -still seeking records from his ACT     Depression: pt counseled  -resumed Wellbutrin XL at 150 mg po q day- increase to/continue at 300 mg po q day - discontinued on 2/23/23); pt appears to be getting activated with hypomania form the wellbutrin     Anxiety: pt counseled  -start vistaril prn     Psychosocial stressors: pt counseled  -SW consulted to assist with resources     Nicotine dependence: pt counseled  -started/continue Nicotine 14 mg patch dermal q day     Polysubstance abuse: pt counseled          Discussed diagnosis, risks and benefits of proposed treatment vs alternative treatments vs no treatment, potential side effects of these treatments and the inherent unpredictability of treatment. The patient expresses understanding of the above and displays the capacity to agree with this treatment given said understanding. Patient also agrees that, currently,  the benefits outweigh the risks and would like to pursue/continue treatment at this time.    Any medications being used off-label were discussed with the patient inclusive of the evidence base for the use of the medications and consent was obtained for the off-label use of the medication.       DISCHARGE PLANNING  Expected Disposition Plan: Home or Self Care      NEED FOR CONTINUED HOSPITALIZATION  Psychiatric illness continues to pose a potential threat to life or bodily function, of self or others, thereby requiring the need for continued inpatient psychiatric hospitalization: Yes, due to: significant psychotic thought disorder, danger to self, gravely disabled, and suicidal ideation, as evidenced by:  Ongoing concerns with SI., Ongoing concerns with command hallucinations to hit/harm others., Ongoing concerns with grave disability with patient unable to perform basic feeding, hygiene and dressing activities without significant constant support., and Ongoing concerns with perceptual aberrancy and paranoid persecutory delusions leading to potential harm of self or others.    Protective inpatient pyschiatric hospitalization required while a safe disposition plan is enacted: Yes    Patient stabilized and ready for discharge from inpatient psychiatric unit: No        STAFF:   Ravin Alvarado MD  Psychiatry

## 2023-02-23 NOTE — PLAN OF CARE
Pt calm and cooperative, isolating to room this evening, avoiding social contact, denies SI at this time, med compliant, appetite good, safety precautions maintained, will continue to monitor

## 2023-02-23 NOTE — PROGRESS NOTES
"   02/23/23 2167   [facility] Group Therapy   Group Name Other  (1:1)   Specific Interventions Leisure Counseling   Participation Level Minimal   Participation Quality Other (see comments)   Insight/Motivation Limited   Affect/Mood Display Flat   Cognition Alert   Psychomotor WNL     Patients isolating in room, reports a "hopeful" mood, looking away when he didn't want to answer a question or responses were delayed and not relating to subject. Patient states he has been reading and likes reading science fiction books, "I didn't know that the Lord of the Rings is a occult."  "

## 2023-02-23 NOTE — PLAN OF CARE
"  Problem: Adult Behavioral Health Plan of Care  Goal: Optimized Coping Skills in Response to Life Stressors  Outcome: Ongoing, Progressing   Group Note      Behavior    The patient attended group psychotherapy today. The patient exhibited blunted affect with depressed mood.       Intervention     CBT-based group psychotherapy focused today on a discussion of what makes life worth living; re-framing and counter-conditioning were the main topics discussed in the session  today.         Response    Patient agreed with the statement that the table and eating together is an opportunity for fellow peers to get to know each other. When asked to specifically comment about the intervention, the patient stated, "I don't know" but he nodded in agreement when it was revealed that even though eating together is a nice and intimate thing, it can also be a place whereby accusations are made and plates and cups can become instruments of violence. The patient grasped key concepts presented today in the session.         Plan    The patient will be encouraged to continue to attend group psychotherapy with focused attention on adaptive coping strategies and the benefits of attempting to verbalize them.   "

## 2023-02-24 PROCEDURE — 99233 SBSQ HOSP IP/OBS HIGH 50: CPT | Mod: ,,, | Performed by: PSYCHIATRY & NEUROLOGY

## 2023-02-24 PROCEDURE — 25000003 PHARM REV CODE 250: Performed by: PSYCHIATRY & NEUROLOGY

## 2023-02-24 PROCEDURE — 90833 PSYTX W PT W E/M 30 MIN: CPT | Mod: ,,, | Performed by: PSYCHIATRY & NEUROLOGY

## 2023-02-24 PROCEDURE — 99233 PR SUBSEQUENT HOSPITAL CARE,LEVL III: ICD-10-PCS | Mod: ,,, | Performed by: PSYCHIATRY & NEUROLOGY

## 2023-02-24 PROCEDURE — 90833 PR PSYCHOTHERAPY W/PATIENT W/E&M, 30 MIN (ADD ON): ICD-10-PCS | Mod: ,,, | Performed by: PSYCHIATRY & NEUROLOGY

## 2023-02-24 PROCEDURE — 11400000 HC PSYCH PRIVATE ROOM

## 2023-02-24 PROCEDURE — S4991 NICOTINE PATCH NONLEGEND: HCPCS | Performed by: PSYCHIATRY & NEUROLOGY

## 2023-02-24 RX ORDER — RISPERIDONE 2 MG/1
4 TABLET ORAL 2 TIMES DAILY
Status: DISCONTINUED | OUTPATIENT
Start: 2023-02-24 | End: 2023-02-28 | Stop reason: HOSPADM

## 2023-02-24 RX ADMIN — HYDROXYZINE HYDROCHLORIDE 50 MG: 25 TABLET, FILM COATED ORAL at 08:02

## 2023-02-24 RX ADMIN — OLANZAPINE 10 MG: 10 TABLET, FILM COATED ORAL at 07:02

## 2023-02-24 RX ADMIN — RISPERIDONE 3 MG: 3 TABLET, FILM COATED ORAL at 08:02

## 2023-02-24 RX ADMIN — THERA TABS 1 TABLET: TAB at 08:02

## 2023-02-24 RX ADMIN — FOLIC ACID 1 MG: 1 TABLET ORAL at 08:02

## 2023-02-24 RX ADMIN — RISPERIDONE 4 MG: 2 TABLET ORAL at 08:02

## 2023-02-24 RX ADMIN — NICOTINE 1 PATCH: 14 PATCH, EXTENDED RELEASE TRANSDERMAL at 08:02

## 2023-02-24 NOTE — PROGRESS NOTES
"  [facility] - Behavioral Health  Adult Nutrition  Progress Note    SUMMARY     Recommendations  1. Continue regular diet as tolerated.   2. If needed add Boost Plus daily or double portions.   3. Encourage good PO intake when needed.    Goals: Pt to continue to 100% PO intake of all meals.  Nutrition Goal Status: goal met   Communication of RD Recs:  (POC)    Assessment and Plan    No nutrition dx at this time. Will continue to monitor.     Reason for Assessment    Reason For Assessment: consult  Diagnosis: psychological disorder  Relevant Medical History: depression, anxiety, hx of psych hospitalization, sucide attempt  Interdisciplinary Rounds: did not attend  General Information Comments: 2/18/23- RD consulted for new admit to psych/. Pt on regular diet. PO intake 100% since admit. No symptoms of anormia/ bulimia/binge eating. Will continue to monitor.    Follow up 2/24/23: Patient continues with a regular diet with reported good appetite.  No tolerance issues.  Labs reviewed.  NKFA.  LBM: MARC.  No nutritional risks found at this time.  RD to continue to monitor.      Nutrition Discharge Planning: Pt to follow a general healthful diet as tolerated.    Nutrition Risk Screen    Nutrition Risk Screen: no indicators present    Nutrition/Diet History    Factors Affecting Nutritional Intake: None identified at this time    Anthropometrics    Temp: 97.2 °F (36.2 °C)  Height: 5' 11" (180.3 cm)  Height (inches): 71 in  Weight Method: Standard Scale  Weight: 124.2 kg (273 lb 11.2 oz)  Weight (lb): 273.7 lb  Ideal Body Weight (IBW), Male: 172 lb  % Ideal Body Weight, Male (lb): 159.13 %  BMI (Calculated): 38.2  BMI Grade: 35 - 39.9 - obesity - grade II    Lab/Procedures/Meds    Pertinent Labs Reviewed: reviewed  Pertinent Labs Comments:  BMP  Lab Results   Component Value Date     02/16/2023    K 3.8 02/16/2023     02/16/2023    CO2 22 (L) 02/16/2023    BUN 11 02/16/2023    CREATININE 0.9 02/16/2023    CALCIUM " 8.9 02/16/2023    ANIONGAP 10 02/16/2023    EGFRNORACEVR >60 02/16/2023     Lab Results   Component Value Date    HGBA1C 4.6 02/18/2023       Pertinent Medications Reviewed: reviewed  Pertinent Medications Comments: buropion, folic acid, MV, nicotine patch, risperidone  Scheduled Meds:   folic acid  1 mg Oral Daily    multivitamin  1 tablet Oral Daily    nicotine  1 patch Transdermal Daily    risperiDONE  4 mg Oral BID         Estimated/Assessed Needs    Weight Used For Calorie Calculations: 124.2 kg (273 lb 13 oz)  Energy Calorie Requirements (kcal): 2200 kcal day (MSJ no AF)  Energy Need Method: [place]-St Jeor  Protein Requirements: 100-124 g day (0.8-1.0 g/kg protien)  Weight Used For Protein Calculations: 124.2 kg (273 lb 13 oz)  Estimated Fluid Requirement Method: RDA Method  RDA Method (mL): 2200     Nutrition Prescription Ordered    Current Diet Order: regular    Evaluation of Received Nutrient/Fluid Intake    I/O: MARC  Energy Calories Required: meeting needs  Protein Required: meeting needs  % Intake of Estimated Energy Needs: 75 - 100 %  % Meal Intake: 75 - 100 %    Nutrition Risk    Level of Risk/Frequency of Follow-up: low     Monitor and Evaluation    Food and Nutrient Intake: food and beverage intake, energy intake  Food and Nutrient Adminstration: diet order  Knowledge/Beliefs/Attitudes: food and nutrition knowledge/skill  Physical Activity and Function: nutrition-related ADLs and IADLs  Anthropometric Measurements: weight, weight change, body mass index  Biochemical Data, Medical Tests and Procedures: glucose/endocrine profile  Nutrition-Focused Physical Findings: overall appearance     Nutrition Follow-Up    RD Follow-up?: Yes  Haily Rose MS, RDN, LDN

## 2023-02-24 NOTE — PLAN OF CARE
Following POC.   Makes needs known.  Denies SI/HI, AVH.  States is is doing very good.  States he had a great day.  Medication complaint.  Appetite is good.  Out on the unit frequently seeking staff.  Minimal interactions with peers.  Encouraged group attendance.  Free of fall.

## 2023-02-24 NOTE — PLAN OF CARE
Problem: Adult Behavioral Health Plan of Care  Goal: Optimized Coping Skills in Response to Life Stressors  Intervention: Promote Effective Coping Strategies  Flowsheets (Taken 2/24/2023 1708)  Supportive Measures: active listening utilized   Group Note        Behavior    The patient exhibited continued guardedness and appeared distant to others while at the dinner table during group psychotherapy today.         Intervention    CBT-based group psychotherapy focused on the importance of peer support and healing may be able to take place by reviewing the value of socializing together when patients commit to eating dinner together rather than taking their food to their own individual rooms.       Response  The patient stated that he had a stomachache and needed to be excused.       Plan    The patient will be encouraged to see the benefits of self empowerment and the value of making choices.

## 2023-02-24 NOTE — PROGRESS NOTES
"   02/24/23 1510   [facility] Group Therapy   Group Name Other  (1:1)   Specific Interventions Coping Skills Training  (positive thinking/healthy leisure outlets)   Participation Level Sharing   Participation Quality Cooperative   Insight/Motivation Improved   Affect/Mood Display Appropriate   Cognition Alert   Psychomotor WNL     Patient presents cooperative, smiling, reports "good" mood, shared he like the support of the hospital helping him. Patient verbalized that he need to make more positive statements about himself and his situation. Patient shared he sometimes walk and did push-ups in his room today. Patient was given a list of daily affirmations to read and improve his thought process.  "

## 2023-02-24 NOTE — PROGRESS NOTES
"  PSYCHIATRY DAILY INPATIENT PROGRESS NOTE  SUBSEQUENT HOSPITAL VISIT    ENCOUNTER DATE: 2/24/2023  SITE: [facility]    DATE OF ADMISSION: 2/17/2023  9:10 AM  LENGTH OF STAY: 7 days      CHIEF COMPLAINT   Price Godoy is a 28 y.o. male, seen during daily chamberlain rounds on the inpatient unit.  Price Godoy presented with the chief complaint of bizarre behavior, suicidal ideation, "I need help socially."       The patient was seen and examined. The chart was reviewed.     Reviewed notes from Rns, CTRS, and LPC and labs from the last 24 hours.    The patient's case was discussed with the treatment team/care providers today including Rns    Staff reports no behavioral or management issues.     The patient has been compliant with treatment.      Subjective 02/24/2023       Today the patient again reports that he is doing "good.. the Lord is good and blessing this day" He reports "ok" sleep last night but good appetite. He remains oddly related with ongoing psychosis, although he is now denying all psychiatric symptoms. He responded "I'm good" to most questions then started that he is having trouble coping.There is a strong suspicion for underlying  PDD  -His thought process remains somewhat derailed. His responses to questions were often odd and/or inconsistent to what was asked  -HIs I/J remains impaired- he was an unreliable historian   -he discussed his stressors, which are primarily housing and substance use.     He recently  talked about hearing "the holy one" guiding him. He again would not discuss his AH this AM.+RIS noted by staff during the last 24 hours.    He dicussed his housing stressors. He is unsure when he last took his SANDY. He has an uncertain discharge disposition at this time. Collateral is being sought from the ACT team; his disposition remains uncertain  -reviewed genetic testing for psychopharmacology- Risperdal is an appropriate tx     There is concern for possible activation form " wellbutrin- he appears much less hypomanic this AM (2nd day the Wellbutrin was held).       The patient denies any side effects to medications.      Psychiatric ROS (observed, reported, or endorsed/denied):  Depressed mood - denies  Interest/pleasure/anhedonia: denies  Guilt/hopelessness/worthlessness - denies  Changes in Sleep - denies  Changes in Appetite - denies  Changes in Concentration - denies  Changes in Energy - denies  PMA/R- denies  Suicidal- active/passive ideations - denies  Homicidal ideations: active/passive ideations - denies    Hallucinations - denies  Delusions - denies  Disorganized behavior - denies  Disorganized speech - denies  Negative symptoms - denies    Elevated mood - denies  Decreased need for sleep - denies  Grandiosity - denies  Racing thoughts - denies  Impulsivity - denies  Irritability- denies  Increased energy - denies  Distractibility - denies  Increase in goal-directed activity or PMA- denies    Symptoms of MAGGIE - denies  Symptoms of Panic Disorder- denies  Symptoms of PTSD - denies        Overall progress: Patient is showing mild improvement         Psychotherapy:  Target symptoms: substance abuse, mood disorder, psychosis  Why chosen therapy is appropriate versus another modality: relevant to diagnosis, patient responds to this modality, evidence based practice  Outcome monitoring methods: self-report, observation  Therapeutic intervention type: insight oriented psychotherapy, behavior modifying psychotherapy, supportive psychotherapy, interactive psychotherapy  Topics discussed/themes: building skills sets for symptom management, symptom recognition  The patient's response to the intervention is accepting. The patient's progress toward treatment goals is limited.   Duration of intervention: 16 minutes.        Medical ROS  General ROS: negative  Ophthalmic ROS: negative  ENT ROS: negative  Allergy and Immunology ROS: negative  Hematological and Lymphatic ROS: negative  Endocrine  "ROS: negative  Respiratory ROS: no cough, shortness of breath, or wheezing  Cardiovascular ROS: no chest pain or dyspnea on exertion  Gastrointestinal ROS: no abdominal pain, change in bowel habits, or black or bloody stools  Genito-Urinary ROS: no dysuria, trouble voiding, or hematuria  Musculoskeletal ROS: negative  Neurological ROS: no TIA or stroke symptoms  Dermatological ROS: negative      PAST MEDICAL HISTORY   Past Medical History:   Diagnosis Date    Anxiety     Depression     History of psychiatric hospitalization 09/12/2019    [facility] 12/29/2022,05/29/2022,03/15/2021, 07/25/2019and [facility] 12/18/2020,09/12/2019.    Suicide attempt            PSYCHOTROPIC MEDICATIONS   Scheduled Meds:   folic acid  1 mg Oral Daily    multivitamin  1 tablet Oral Daily    nicotine  1 patch Transdermal Daily    risperiDONE  3 mg Oral BID     Continuous Infusions:  PRN Meds:.hydrOXYzine HCL, OLANZapine **AND** OLANZapine, ondansetron, promethazine        EXAMINATION    VITALS   Vitals:    02/22/23 1912 02/23/23 0845 02/23/23 2019 02/24/23 0745   BP: 128/63 (!) 141/71 111/66 123/75   BP Location: Left arm Right arm Left arm    Patient Position: Sitting Sitting Sitting Sitting   Pulse: 91 85 (!) 116 68   Resp: 18 18 18 18   Temp: 97.8 °F (36.6 °C) 97 °F (36.1 °C) 98.5 °F (36.9 °C) 98.2 °F (36.8 °C)   TempSrc:  Temporal Temporal Temporal   Weight:       Height:           Body mass index is 37.8 kg/m².        CONSTITUTIONAL  General Appearance: unremarkable, age appropriate     MUSCULOSKELETAL  Muscle Strength and Tone:not examined, no tremor, no tic  Abnormal Involuntary Movements: No  Gait and Station: non-ataxic     PSYCHIATRIC   Level of Consciousness: awake and alert   Orientation: person, place, situation, and time  Grooming: hospital [place]  Psychomotor Behavior: cooperative, redirectable, eye contact minimal  Speech: normal tone, normal rate, normal volume, soft  Language: grossly intact  Mood: "ok"  Affect: Flat  Thought Process: " blocking and loose associations  Associations: loose at times   Thought Content: denies SI and denies HI  Perceptions: denies AH and denies  VH  Memory: Able to recall past events, Remote intact, and Recent intact  Attention:Easily distracted  Fund of Knowledge: Aware of current events and Vocabulary appropriate   Estimate if Intelligence:  Average based on work/education history, vocabulary and mental status exam  Insight: limited awareness of illness  Judgment: limited secondary to psychosis      DIAGNOSTIC TESTING   Laboratory Results  No results found for this or any previous visit (from the past 24 hour(s)).            MEDICAL DECISION MAKING      ASSESSMENT:   Schizophrneia, chronic with acute exacerbation  MDD, recurrent, severe with psychotic features  Unspecified Anxiety Disorder     Psychosocial stressors     Nicotine dependence  Polysubstance abuse        PROBLEM LIST AND MANAGEMENT PLANS     Psychosis: pt counseled  -resume Risperdal at 0.5 mg po BID- increase to 1 mg po BID- increase  2 mg po BID- increase to 2/3- increase to 3 mg po BID- increase to 3/4 today then 4 mg po BID on Saturday 2/25/23    He reports that he received a SANDY during the last hospitalization; Risperdal 120 mg sers SANDY given on about about 1/5/23 and due again on 2/5/23; he then reports that he started a new shot since his discharge form that facility  -still seeking records from his ACT  -pt will d/c the last SANDY and continue PO meds for now until the SANDY is washed out; he will likely benefit from resuming Invega SANDY in the future as this was previously helpful/effective     Depression: pt counseled  -resumed Wellbutrin XL at 150 mg po q day- increase to/continue at 300 mg po q day - discontinued on 2/23/23); pt appears to be getting activated with hypomania form the wellbutrin     Anxiety: pt counseled  -start vistaril prn     Psychosocial stressors: pt counseled  -SW consulted to assist with resources     Nicotine dependence: pt  counseled  -started/continue Nicotine 14 mg patch dermal q day     Polysubstance abuse: pt counseled          Discussed diagnosis, risks and benefits of proposed treatment vs alternative treatments vs no treatment, potential side effects of these treatments and the inherent unpredictability of treatment. The patient expresses understanding of the above and displays the capacity to agree with this treatment given said understanding. Patient also agrees that, currently, the benefits outweigh the risks and would like to pursue/continue treatment at this time.    Any medications being used off-label were discussed with the patient inclusive of the evidence base for the use of the medications and consent was obtained for the off-label use of the medication.       DISCHARGE PLANNING  Expected Disposition Plan: Home or Self Care      NEED FOR CONTINUED HOSPITALIZATION  Psychiatric illness continues to pose a potential threat to life or bodily function, of self or others, thereby requiring the need for continued inpatient psychiatric hospitalization: Yes, due to: significant psychotic thought disorder, danger to self, gravely disabled, and suicidal ideation, as evidenced by:  Ongoing concerns with SI., Ongoing concerns with command hallucinations to hit/harm others., Ongoing concerns with grave disability with patient unable to perform basic feeding, hygiene and dressing activities without significant constant support., and Ongoing concerns with perceptual aberrancy and paranoid persecutory delusions leading to potential harm of self or others.    Protective inpatient pyschiatric hospitalization required while a safe disposition plan is enacted: Yes    Patient stabilized and ready for discharge from inpatient psychiatric unit: No        STAFF:   Ravin Alvarado MD  Psychiatry

## 2023-02-24 NOTE — PLAN OF CARE
Nutrition Plan of Care:    Recommendations  1. Continue regular diet as tolerated.   2. If needed add Boost Plus daily or double portions.   3. Encourage good PO intake when needed.     Goals: Pt to continue to 100% PO intake of all meals.  Nutrition Goal Status: goal met   Communication of RD Recs:  (POC)     Assessment and Plan     No nutrition dx at this time. Will continue to monitor.     Haily Rose, MS, RDN, LDN

## 2023-02-24 NOTE — PLAN OF CARE
No falls, accepting meals and meds, denies suicidal ideation. Reporting A/V hallucinations today needing Zyprexa po. Mostly calm and cooperative most of the day. Seeks out staff. Safety maintained.

## 2023-02-25 PROCEDURE — 99233 SBSQ HOSP IP/OBS HIGH 50: CPT | Mod: ,,, | Performed by: PSYCHIATRY & NEUROLOGY

## 2023-02-25 PROCEDURE — 11400000 HC PSYCH PRIVATE ROOM

## 2023-02-25 PROCEDURE — 25000003 PHARM REV CODE 250: Performed by: PSYCHIATRY & NEUROLOGY

## 2023-02-25 PROCEDURE — 99233 PR SUBSEQUENT HOSPITAL CARE,LEVL III: ICD-10-PCS | Mod: ,,, | Performed by: PSYCHIATRY & NEUROLOGY

## 2023-02-25 PROCEDURE — S4991 NICOTINE PATCH NONLEGEND: HCPCS | Performed by: PSYCHIATRY & NEUROLOGY

## 2023-02-25 RX ADMIN — RISPERIDONE 4 MG: 2 TABLET ORAL at 08:02

## 2023-02-25 RX ADMIN — THERA TABS 1 TABLET: TAB at 09:02

## 2023-02-25 RX ADMIN — OLANZAPINE 10 MG: 10 TABLET, FILM COATED ORAL at 06:02

## 2023-02-25 RX ADMIN — FOLIC ACID 1 MG: 1 TABLET ORAL at 09:02

## 2023-02-25 RX ADMIN — NICOTINE 1 PATCH: 14 PATCH, EXTENDED RELEASE TRANSDERMAL at 09:02

## 2023-02-25 RX ADMIN — HYDROXYZINE HYDROCHLORIDE 50 MG: 25 TABLET, FILM COATED ORAL at 03:02

## 2023-02-25 RX ADMIN — RISPERIDONE 4 MG: 2 TABLET ORAL at 09:02

## 2023-02-25 NOTE — NURSING
Rested quietly with closed eyes and even resp for 8.5 hours.  Modified VC and all precautions maintained.  Pathways clear and bed in low position.

## 2023-02-25 NOTE — PROGRESS NOTES
"  PSYCHIATRY DAILY INPATIENT PROGRESS NOTE  SUBSEQUENT HOSPITAL VISIT    ENCOUNTER DATE: 2/25/2023  SITE: [facility]    DATE OF ADMISSION: 2/17/2023  9:10 AM  LENGTH OF STAY: 8 days      CHIEF COMPLAINT   Price Godoy is a 28 y.o. male, seen during daily chamberlain rounds on the inpatient unit.  Price Godoy presented with the chief complaint of bizarre behavior, suicidal ideation, "I need help socially."       The patient was seen and examined. The chart was reviewed.     Reviewed notes from Rns, CTRS, and LPC and labs from the last 24 hours.    The patient's case was discussed with the treatment team/care providers today including Rns    Staff reports no behavioral or management issues.     The patient has been compliant with treatment.      Subjective 02/25/2023       Patient interviewed, staff gave report, chart reviewed. Patient reports that he is here for help, but is unable to communicate needs in a meaningful manner. He is vague is KAYDEN and overall severe poverty of content. Avoids eye contact. He denies AVH or paranoia or depression or anxiety. He is unable to communicate plans for the future and denies family support or is he willing to discuss previous support system. States he is sleeping and eating well. Isolative. Preoccupied.     The patient denies any side effects to medications.      Psychiatric ROS (observed, reported, or endorsed/denied):  Depressed mood - denies  Interest/pleasure/anhedonia: denies  Guilt/hopelessness/worthlessness - denies  Changes in Sleep - denies  Changes in Appetite - denies  Changes in Concentration - denies  Changes in Energy - denies  PMA/R- denies  Suicidal- active/passive ideations - denies  Homicidal ideations: active/passive ideations - denies    Hallucinations - denies  Delusions - denies  Disorganized behavior - denies  Disorganized speech - denies  Negative symptoms - denies    Elevated mood - denies  Decreased need for sleep - denies  Grandiosity - " denies  Racing thoughts - denies  Impulsivity - denies  Irritability- denies  Increased energy - denies  Distractibility - denies  Increase in goal-directed activity or PMA- denies    Symptoms of MAGGIE - denies  Symptoms of Panic Disorder- denies  Symptoms of PTSD - denies        Overall progress: Patient is showing mild improvement       Medical ROS  General ROS: negative  Ophthalmic ROS: negative  ENT ROS: negative  Allergy and Immunology ROS: negative  Hematological and Lymphatic ROS: negative  Endocrine ROS: negative  Respiratory ROS: no cough, shortness of breath, or wheezing  Cardiovascular ROS: no chest pain or dyspnea on exertion  Gastrointestinal ROS: no abdominal pain, change in bowel habits, or black or bloody stools  Genito-Urinary ROS: no dysuria, trouble voiding, or hematuria  Musculoskeletal ROS: negative  Neurological ROS: no TIA or stroke symptoms  Dermatological ROS: negative      PAST MEDICAL HISTORY   Past Medical History:   Diagnosis Date    Anxiety     Depression     History of psychiatric hospitalization 09/12/2019    [facility] 12/29/2022,05/29/2022,03/15/2021, 07/25/2019and [facility] 12/18/2020,09/12/2019.    Suicide attempt            PSYCHOTROPIC MEDICATIONS   Scheduled Meds:   folic acid  1 mg Oral Daily    multivitamin  1 tablet Oral Daily    nicotine  1 patch Transdermal Daily    risperiDONE  4 mg Oral BID     Continuous Infusions:  PRN Meds:.hydrOXYzine HCL, OLANZapine **AND** OLANZapine, ondansetron, promethazine        EXAMINATION    VITALS   Vitals:    02/23/23 2019 02/24/23 0745 02/24/23 1955 02/25/23 0753   BP: 111/66 123/75 138/60 122/68   BP Location: Left arm   Left arm   Patient Position: Sitting Sitting  Lying   Pulse: (!) 116 68 105 67   Resp: 18 18 18 18   Temp: 98.5 °F (36.9 °C) 98.2 °F (36.8 °C) 98.6 °F (37 °C) 97.1 °F (36.2 °C)   TempSrc: Temporal Temporal  Temporal   Weight:       Height:           Body mass index is 37.8 kg/m².        CONSTITUTIONAL  General Appearance:  "unremarkable, age appropriate     MUSCULOSKELETAL  Muscle Strength and Tone:not examined, no tremor, no tic  Abnormal Involuntary Movements: No  Gait and Station: non-ataxic     PSYCHIATRIC   Level of Consciousness: awake and alert   Orientation: person, place, situation, and time  Grooming: hospital [place]  Psychomotor Behavior: cooperative, redirectable, eye contact minimal  Speech: normal tone, normal rate, normal volume, soft  Language: grossly intact  Mood: "ok"  Affect: Flat  Thought Process: severe poverty vague  Associations: KAYDEN  Thought Content: denies SI and denies HI  Perceptions: denies AH and denies  VH  Memory: Able to recall past events, Remote intact, and Recent intact  Attention:Easily distracted  Fund of Knowledge: Aware of current events and Vocabulary appropriate   Estimate if Intelligence:  Average based on work/education history, vocabulary and mental status exam  Insight: limited awareness of illness  Judgment: limited secondary to psychosis      DIAGNOSTIC TESTING   Laboratory Results  No results found for this or any previous visit (from the past 24 hour(s)).            MEDICAL DECISION MAKING      ASSESSMENT:   Schizophrneia, chronic with acute exacerbation  MDD, recurrent, severe with psychotic features  Unspecified Anxiety Disorder     Psychosocial stressors     Nicotine dependence  Polysubstance abuse        PROBLEM LIST AND MANAGEMENT PLANS     Psychosis: pt counseled  -resume Risperdal at 0.5 mg po BID- increase to 1 mg po BID- increase  2 mg po BID- increase to 2/3- increase to 3 mg po BID- increase to 3/4 today then 4 mg po BID on Saturday 2/25/23    He reports that he received a SANDY during the last hospitalization; Risperdal 120 mg sers SANDY given on about about 1/5/23 and due again on 2/5/23; he then reports that he started a new shot since his discharge form that facility  -still seeking records from his ACT  -pt will d/c the last SANDY and continue PO meds for now until the SANDY is " washed out; he will likely benefit from resuming Invega SANDY in the future as this was previously helpful/effective     Depression: pt counseled  -resumed Wellbutrin XL at 150 mg po q day- increase to/continue at 300 mg po q day - discontinued on 2/23/23); pt appears to be getting activated with hypomania form the wellbutrin     Anxiety: pt counseled  -start vistaril prn     Psychosocial stressors: pt counseled  -SW consulted to assist with resources     Nicotine dependence: pt counseled  -started/continue Nicotine 14 mg patch dermal q day     Polysubstance abuse: pt counseled          Discussed diagnosis, risks and benefits of proposed treatment vs alternative treatments vs no treatment, potential side effects of these treatments and the inherent unpredictability of treatment. The patient expresses understanding of the above and displays the capacity to agree with this treatment given said understanding. Patient also agrees that, currently, the benefits outweigh the risks and would like to pursue/continue treatment at this time.    Any medications being used off-label were discussed with the patient inclusive of the evidence base for the use of the medications and consent was obtained for the off-label use of the medication.       DISCHARGE PLANNING  Expected Disposition Plan: Home or Self Care      NEED FOR CONTINUED HOSPITALIZATION  Psychiatric illness continues to pose a potential threat to life or bodily function, of self or others, thereby requiring the need for continued inpatient psychiatric hospitalization: Yes, due to: significant psychotic thought disorder, danger to self, gravely disabled, and suicidal ideation, as evidenced by:  Ongoing concerns with SI., Ongoing concerns with command hallucinations to hit/harm others., Ongoing concerns with grave disability with patient unable to perform basic feeding, hygiene and dressing activities without significant constant support., and Ongoing concerns with  perceptual aberrancy and paranoid persecutory delusions leading to potential harm of self or others.    Protective inpatient pyschiatric hospitalization required while a safe disposition plan is enacted: Yes    Patient stabilized and ready for discharge from inpatient psychiatric unit: No        STAFF:   Kaley Correa MD  Psychiatry

## 2023-02-25 NOTE — PLAN OF CARE
Patient states he has a little anxiety and would like something to help him, gave atarax 50mg po prn for anxiety. Taking medication as ordered, vs stable, appetite good. Promoted safety plan, effective coping skills, mood improvement, will continue to monitor safety.

## 2023-02-25 NOTE — PLAN OF CARE
Problem: Adult Behavioral Health Plan of Care  Goal: Optimized Coping Skills in Response to Life Stressors  Flowsheets (Taken 2/25/2023 0851)  Optimized Coping Skills in Response to Life Stressors: making progress toward outcome   Group Note          Behavior    The patient attended group psychotherapy today. The patient's depression has lessened significantly this morning as evidenced by the patient's increased tolerance of the opinions of others.         Intervention     CBT-based group psychotherapy focused on adaptive coping strategies with focused attention on how does one respond to others and themselves when feeling imposed upon in any way.         Response    The patient acknowledged the value of waiting patiently even though he wanted to leave the session a few times this morning. The patient was able to grasp key principles presented in terms of holding each day as a surprise as well as the value of developing a daily self affirmation exercise to help in decreasing the depressive episodes.           Plan    To continue to encourage the patient to attend group psychotherapy with continued focused attention on how to develop adaptive coping strategies to assist in managing depression.

## 2023-02-25 NOTE — PLAN OF CARE
"States hearing voices and seeing things but "I can't explain it".  Unable to give further details.  Asked staff many personal questions and became flirtatious.  Said "I've never been with a woman.  I want to get .  I'm looking for a woman".  [place] complimentary this evening.  At nursing station frequently.  Accepted meds.  Stressed compliance with meds upon discharge.     "

## 2023-02-26 PROCEDURE — 99233 SBSQ HOSP IP/OBS HIGH 50: CPT | Mod: ,,, | Performed by: PSYCHIATRY & NEUROLOGY

## 2023-02-26 PROCEDURE — 99233 PR SUBSEQUENT HOSPITAL CARE,LEVL III: ICD-10-PCS | Mod: ,,, | Performed by: PSYCHIATRY & NEUROLOGY

## 2023-02-26 PROCEDURE — 25000003 PHARM REV CODE 250: Performed by: PSYCHIATRY & NEUROLOGY

## 2023-02-26 PROCEDURE — S4991 NICOTINE PATCH NONLEGEND: HCPCS | Performed by: PSYCHIATRY & NEUROLOGY

## 2023-02-26 PROCEDURE — 11400000 HC PSYCH PRIVATE ROOM

## 2023-02-26 RX ADMIN — FOLIC ACID 1 MG: 1 TABLET ORAL at 09:02

## 2023-02-26 RX ADMIN — RISPERIDONE 4 MG: 2 TABLET ORAL at 08:02

## 2023-02-26 RX ADMIN — RISPERIDONE 4 MG: 2 TABLET ORAL at 09:02

## 2023-02-26 RX ADMIN — NICOTINE 1 PATCH: 14 PATCH, EXTENDED RELEASE TRANSDERMAL at 09:02

## 2023-02-26 RX ADMIN — THERA TABS 1 TABLET: TAB at 09:02

## 2023-02-26 RX ADMIN — OLANZAPINE 10 MG: 10 TABLET, FILM COATED ORAL at 01:02

## 2023-02-26 NOTE — PSYCH
Had a long, linear conversation with pt.  He appears much more clear in thought now.  He did have a small spell during the conversation where he appeared to be distracted and focusing on something else for a few seconds.  But pt reoriented easily.

## 2023-02-26 NOTE — PROGRESS NOTES
"  PSYCHIATRY DAILY INPATIENT PROGRESS NOTE  SUBSEQUENT HOSPITAL VISIT    ENCOUNTER DATE: 2/26/2023  [facility]    DATE OF ADMISSION: 2/17/2023  9:10 AM  LENGTH OF STAY: 9 days      CHIEF COMPLAINT   Price Godoy is a 28 y.o. male, seen during daily chamberlain rounds on the inpatient unit.  Price Godoy presented with the chief complaint of bizarre behavior, suicidal ideation, "I need help socially."       The patient was seen and examined. The chart was reviewed.     Reviewed notes from Rns, CTRS, and LPC and labs from the last 24 hours.    The patient's case was discussed with the treatment team/care providers today including Rns    Staff reports no behavioral or management issues.     The patient has been compliant with treatment.      Subjective 02/26/2023       Patient interviewed, staff gave report, chart reviewed. Patient is argumentative on exam-stating that he never saw a doctor for CEC and that he shouldn't be here and that he is being billed for stay that he did not request. Attempts to redirected to yesterday's conversation where he stated that he needed help. He would not take to redirtection, he was offered copy of PEC/CEC but he refused. He fixated on other matter in paperwork-showing in the paperwork that he can leave if he is doing better. He was not taking to any redirection and was subtly implying that he would nacho. Interview was terminated.     Met with patient a second time. Provide PEC/CEC details. He was not argumentative. He states that the reason that he stated that he did not have family yesterday was because he is attempting to make it on his own. He could not specify how long he has been trying to do it on his own, but that it had not been long. He states that he will move into his father's rental. He described plans to go to businesses and fill out application, at [employer] and tech companies. He denies AVH, SI, HI.     The patient denies any side effects to " medications.      Psychiatric ROS (observed, reported, or endorsed/denied):  Depressed mood - denies  Interest/pleasure/anhedonia: denies  Guilt/hopelessness/worthlessness - denies  Changes in Sleep - denies  Changes in Appetite - denies  Changes in Concentration - denies  Changes in Energy - denies  PMA/R- denies  Suicidal- active/passive ideations - denies  Homicidal ideations: active/passive ideations - denies    Hallucinations - denies  Delusions - denies  Disorganized behavior - denies  Disorganized speech - denies  Negative symptoms - denies    Elevated mood - denies  Decreased need for sleep - denies  Grandiosity - denies  Racing thoughts - denies  Impulsivity - denies  Irritability- denies  Increased energy - denies  Distractibility - denies  Increase in goal-directed activity or PMA- denies    Symptoms of MAGGIE - denies  Symptoms of Panic Disorder- denies  Symptoms of PTSD - denies        Overall progress: greatly improved compared to yesterday       Medical ROS  General ROS: negative  Ophthalmic ROS: negative  ENT ROS: negative  Allergy and Immunology ROS: negative  Hematological and Lymphatic ROS: negative  Endocrine ROS: negative  Respiratory ROS: no cough, shortness of breath, or wheezing  Cardiovascular ROS: no chest pain or dyspnea on exertion  Gastrointestinal ROS: no abdominal pain, change in bowel habits, or black or bloody stools  Genito-Urinary ROS: no dysuria, trouble voiding, or hematuria  Musculoskeletal ROS: negative  Neurological ROS: no TIA or stroke symptoms  Dermatological ROS: negative      PAST MEDICAL HISTORY   Past Medical History:   Diagnosis Date    Anxiety     Depression     History of psychiatric hospitalization 09/12/2019    [facility] 12/29/2022,05/29/2022,03/15/2021, 07/25/2019and [facility] 12/18/2020,09/12/2019.    Suicide attempt            PSYCHOTROPIC MEDICATIONS   Scheduled Meds:   folic acid  1 mg Oral Daily    multivitamin  1 tablet Oral Daily    nicotine  1 patch Transdermal Daily  "   risperiDONE  4 mg Oral BID     Continuous Infusions:  PRN Meds:.hydrOXYzine HCL, OLANZapine **AND** OLANZapine, ondansetron, promethazine        EXAMINATION    VITALS   Vitals:    02/25/23 1946 02/25/23 1948 02/26/23 0740 02/26/23 0801   BP: (!) 144/87  129/65    BP Location: Right arm      Patient Position: Sitting      Pulse: 103 96 70    Resp: 20  18    Temp: 98.2 °F (36.8 °C)  97.8 °F (36.6 °C)    TempSrc:       Weight:    124.2 kg (273 lb 13 oz)   Height:           Body mass index is 38.19 kg/m².        CONSTITUTIONAL  General Appearance: unremarkable, age appropriate     MUSCULOSKELETAL  Muscle Strength and Tone:not examined, no tremor, no tic  Abnormal Involuntary Movements: No  Gait and Station: non-ataxic     PSYCHIATRIC   Level of Consciousness: awake and alert   Orientation: person, place, situation, and time  Grooming: fair  Psychomotor Behavior: initially argumentative, cooperative, improved contact minimal  Speech: normal tone, normal rate, normal volume, soft  Language: grossly intact  Mood: "ok"  Affect: Flat  Thought Process: linear logical mild vagueness  Associations: KAYDEN  Thought Content: denies SI and denies HI  Perceptions: denies AH and denies  VH  Memory: Able to recall past events, Remote intact, and Recent intact  Attention:improved ficus   Fund of Knowledge: Aware of current events and Vocabulary appropriate   Estimate if Intelligence:  Average based on work/education history, vocabulary and mental status exam  Insight: fair  Judgment: fair      DIAGNOSTIC TESTING   Laboratory Results  No results found for this or any previous visit (from the past 24 hour(s)).            MEDICAL DECISION MAKING      ASSESSMENT:   Schizophrneia, chronic with acute exacerbation  MDD, recurrent, severe with psychotic features  Unspecified Anxiety Disorder     Psychosocial stressors     Nicotine dependence  Polysubstance abuse        PROBLEM LIST AND MANAGEMENT PLANS    Patient plans on living in father's " mahad, follow up with ACT team, and uses [facility] in [place]. Discussed with patient that he will need to meet with SW to established follow up, confirm housing and for transportation to be arranged, as well as meeting with primary psychiatrist    Psychosis: pt counseled  -resume Risperdal at 0.5 mg po BID- increase to 1 mg po BID- increase  2 mg po BID- increase to 2/3- increase to 3 mg po BID- increase to 3/4 today then 4 mg po BID on Saturday 2/25/23    He reports that he received a SANDY during the last hospitalization; Risperdal 120 mg sers SANDY given on about about 1/5/23 and due again on 2/5/23; he then reports that he started a new shot since his discharge form that facility  -still seeking records from his ACT  -pt will d/c the last SANDY and continue PO meds for now until the SANDY is washed out; he will likely benefit from resuming Invega SANDY in the future as this was previously helpful/effective     Depression: pt counseled  -resumed Wellbutrin XL at 150 mg po q day- increase to/continue at 300 mg po q day - discontinued on 2/23/23); pt appears to be getting activated with hypomania form the wellbutrin     Anxiety: pt counseled  -Patient is requesting Vistaril 50mg PO BID scheduled     Psychosocial stressors: pt counseled  -SW consulted to assist with resources     Nicotine dependence: pt counseled  -started/continue Nicotine 14 mg patch dermal q day     Polysubstance abuse: pt counseled          Discussed diagnosis, risks and benefits of proposed treatment vs alternative treatments vs no treatment, potential side effects of these treatments and the inherent unpredictability of treatment. The patient expresses understanding of the above and displays the capacity to agree with this treatment given said understanding. Patient also agrees that, currently, the benefits outweigh the risks and would like to pursue/continue treatment at this time.    Any medications being used off-label were discussed with the patient  inclusive of the evidence base for the use of the medications and consent was obtained for the off-label use of the medication.       DISCHARGE PLANNING  Expected Disposition Plan: Home or Self Care      NEED FOR CONTINUED HOSPITALIZATION  Psychiatric illness continues to pose a potential threat to life or bodily function, of self or others, thereby requiring the need for continued inpatient psychiatric hospitalization: Yes, due to: significant psychotic thought disorder, danger to self, gravely disabled, and suicidal ideation, as evidenced by:  Ongoing concerns with SI., Ongoing concerns with command hallucinations to hit/harm others., Ongoing concerns with grave disability with patient unable to perform basic feeding, hygiene and dressing activities without significant constant support., and Ongoing concerns with perceptual aberrancy and paranoid persecutory delusions leading to potential harm of self or others.    Protective inpatient pyschiatric hospitalization required while a safe disposition plan is enacted: Yes    Patient stabilized and ready for discharge from inpatient psychiatric unit: No        STAFF:   Kaley oCrrea MD  Psychiatry

## 2023-02-26 NOTE — NURSING
Rested intermittently with closed eyes and even resp for 7 hours.  Modified VC and all precautions maintained.  Pathways clear and bed in low position.

## 2023-02-26 NOTE — PLAN OF CARE
"Mood improving.  Reports hearing "little voices in the background".  Couldn't tell staff what the voices are saying.  Said he likes his current medications.  Calm.  Denies suicidal/homicidal thoughts.  Slowed thoughts.  Very observant of staff.  Accepted hs meds.  Stressed compliance with meds upon discharge.    "

## 2023-02-26 NOTE — PLAN OF CARE
Pt denies any S/I or H/I at this time.  He is calm and cooperative but states he is ready to leave.  Reports he has a place to go now and does not want to be here any longer.  Pt denies ever saying he needed help and coming to the hospital willingly.  Pt redirected and informed that the treatment team will be returning on Monday, and he can discuss his discharge arrangements with them tomorrow.  Pt verbalized understanding.  He is less distracted today.  Thought process appears to be improving.  Will continue to monitor.

## 2023-02-27 PROCEDURE — 25000003 PHARM REV CODE 250: Performed by: PSYCHIATRY & NEUROLOGY

## 2023-02-27 PROCEDURE — 11400000 HC PSYCH PRIVATE ROOM

## 2023-02-27 PROCEDURE — 99232 PR SUBSEQUENT HOSPITAL CARE,LEVL II: ICD-10-PCS | Mod: ,,, | Performed by: STUDENT IN AN ORGANIZED HEALTH CARE EDUCATION/TRAINING PROGRAM

## 2023-02-27 PROCEDURE — 99232 SBSQ HOSP IP/OBS MODERATE 35: CPT | Mod: ,,, | Performed by: STUDENT IN AN ORGANIZED HEALTH CARE EDUCATION/TRAINING PROGRAM

## 2023-02-27 PROCEDURE — S4991 NICOTINE PATCH NONLEGEND: HCPCS | Performed by: PSYCHIATRY & NEUROLOGY

## 2023-02-27 PROCEDURE — 90833 PSYTX W PT W E/M 30 MIN: CPT | Mod: ,,, | Performed by: STUDENT IN AN ORGANIZED HEALTH CARE EDUCATION/TRAINING PROGRAM

## 2023-02-27 PROCEDURE — 90833 PR PSYCHOTHERAPY W/PATIENT W/E&M, 30 MIN (ADD ON): ICD-10-PCS | Mod: ,,, | Performed by: STUDENT IN AN ORGANIZED HEALTH CARE EDUCATION/TRAINING PROGRAM

## 2023-02-27 RX ADMIN — RISPERIDONE 4 MG: 2 TABLET ORAL at 08:02

## 2023-02-27 RX ADMIN — FOLIC ACID 1 MG: 1 TABLET ORAL at 08:02

## 2023-02-27 RX ADMIN — HYDROXYZINE HYDROCHLORIDE 50 MG: 25 TABLET, FILM COATED ORAL at 08:02

## 2023-02-27 RX ADMIN — THERA TABS 1 TABLET: TAB at 08:02

## 2023-02-27 RX ADMIN — NICOTINE 1 PATCH: 14 PATCH, EXTENDED RELEASE TRANSDERMAL at 08:02

## 2023-02-27 NOTE — PROGRESS NOTES
"  PSYCHIATRY DAILY INPATIENT PROGRESS NOTE  SUBSEQUENT HOSPITAL VISIT    ENCOUNTER DATE: 2/27/2023  [facility]    DATE OF ADMISSION: 2/17/2023  9:10 AM  LENGTH OF STAY: 10 days      CHIEF COMPLAINT   Price Godoy is a 28 y.o. male, seen during daily chamberlain rounds on the inpatient unit.  Price Godoy presented with the chief complaint of bizarre behavior, suicidal ideation, "I need help socially."       The patient was seen and examined. The chart was reviewed.     Reviewed notes from Rns and MD and labs from the last 24 hours.    The patient's case was discussed with the treatment team/care providers today including Rns    Staff reports no behavioral or management issues.     The patient has been compliant with treatment.      Subjective 02/27/2023      Explored patient's motivation for medication compliance, reports he is willing to continue his medication, cites "I want to stay out of the hospital," discussed his goals (getting a job) for the future and how medication non compliance will interfere with is goals. Also discussed MJ abuse and risks, strongly advised cessation and health coping skills which patient states are "going for walks."    The patient denies any side effects to medications.        Psychiatric ROS (observed, reported, or endorsed/denied):  Depressed mood - denies  Interest/pleasure/anhedonia: denies  Guilt/hopelessness/worthlessness - denies  Changes in Sleep - denies  Changes in Appetite - denies  Changes in Concentration - denies  Changes in Energy - denies  PMA/R- denies  Suicidal- active/passive ideations - denies  Homicidal ideations: active/passive ideations - denies    Hallucinations - denies  Delusions - denies  Disorganized behavior - denies  Disorganized speech - denies  Negative symptoms - denies    Elevated mood - denies  Decreased need for sleep - denies  Grandiosity - denies  Racing thoughts - denies  Impulsivity - denies  Irritability- denies  Increased energy " - denies  Distractibility - denies  Increase in goal-directed activity or PMA- denies    Symptoms of MAGGIE - denies  Symptoms of Panic Disorder- denies  Symptoms of PTSD - denies        Overall progress: greatly improved       Psychotherapy:  Target symptoms: substance abuse, psychosis  Why chosen therapy is appropriate versus another modality: relevant to diagnosis  Outcome monitoring methods: self-report  Therapeutic intervention type: supportive psychotherapy  Topics discussed/themes: building skills sets for symptom management, symptom recognition  The patient's response to the intervention is accepting. The patient's progress toward treatment goals is good.   Duration of intervention: 16 minutes.        Medical ROS  General ROS: negative  Ophthalmic ROS: negative  ENT ROS: negative  Allergy and Immunology ROS: negative  Hematological and Lymphatic ROS: negative  Endocrine ROS: negative  Respiratory ROS: no cough, shortness of breath, or wheezing  Cardiovascular ROS: no chest pain or dyspnea on exertion  Gastrointestinal ROS: no abdominal pain, change in bowel habits, or black or bloody stools  Genito-Urinary ROS: no dysuria, trouble voiding, or hematuria  Musculoskeletal ROS: negative  Neurological ROS: no TIA or stroke symptoms  Dermatological ROS: negative      PAST MEDICAL HISTORY   Past Medical History:   Diagnosis Date    Anxiety     Depression     History of psychiatric hospitalization 09/12/2019    [facility] 12/29/2022,05/29/2022,03/15/2021, 07/25/2019and [facility] 12/18/2020,09/12/2019.    Suicide attempt            PSYCHOTROPIC MEDICATIONS   Scheduled Meds:   folic acid  1 mg Oral Daily    multivitamin  1 tablet Oral Daily    nicotine  1 patch Transdermal Daily    risperiDONE  4 mg Oral BID     Continuous Infusions:  PRN Meds:.hydrOXYzine HCL, OLANZapine **AND** OLANZapine, ondansetron, promethazine        EXAMINATION    VITALS   Vitals:    02/26/23 0740 02/26/23 0801 02/26/23 1949 02/27/23 0711   BP: 129/65  (!)  "150/58 128/76   BP Location:   Right arm Right arm   Patient Position:   Sitting Sitting   Pulse: 70  97 96   Resp: 18  20 18   Temp: 97.8 °F (36.6 °C)  97.8 °F (36.6 °C) 97.8 °F (36.6 °C)   TempSrc:   Temporal Temporal   Weight:  124.2 kg (273 lb 13 oz)     Height:           Body mass index is 38.19 kg/m².        CONSTITUTIONAL  General Appearance: unremarkable, age appropriate     MUSCULOSKELETAL  Muscle Strength and Tone:not examined, no tremor, no tic  Abnormal Involuntary Movements: No  Gait and Station: non-ataxic     PSYCHIATRIC   Level of Consciousness: awake and alert   Orientation: person, place, situation, and time  Grooming: fair  Psychomotor Behavior: initially argumentative, cooperative, improved  Speech: normal tone, normal rate, normal volume,   Language: grossly intact  Mood: "good"  Affect: Flat  Thought Process: linear   Associations: intact  Thought Content: denies SI and denies HI  Perceptions: denies AH and denies  VH  Memory: Able to recall past events, Remote intact, and Recent intact  Attention:improved ficus   Fund of Knowledge: Aware of current events and Vocabulary appropriate   Estimate if Intelligence:  Average based on work/education history, vocabulary and mental status exam  Insight: fair  Judgment: fair      DIAGNOSTIC TESTING   Laboratory Results  No results found for this or any previous visit (from the past 24 hour(s)).            MEDICAL DECISION MAKING      ASSESSMENT:   Schizophrneia, chronic with acute exacerbation  MDD, recurrent, severe with psychotic features  Unspecified Anxiety Disorder     Psychosocial stressors     Nicotine dependence  Polysubstance abuse            PROBLEM LIST AND MANAGEMENT PLANS    Patient plans on living in father's rental, follow up with ACT team, and uses [facility] in [place]. Discussed with patient that he will need to meet with SW to established follow up, confirm housing and for transportation to be arranged, as well as meeting with primary " psychiatrist    Psychosis: pt counseled  -resume Risperdal at 0.5 mg po BID- increase to 1 mg po BID- increase  2 mg po BID- increase to 2/3- increase to 3 mg po BID- increase to 3/4 today then 4 mg po BID on Saturday 2/25/23    He reports that he received a SANDY during the last hospitalization; Risperdal 120 mg sers SANDY given on about about 1/5/23 and due again on 2/5/23; he then reports that he started a new shot since his discharge form that facility  -still seeking records from his ACT  -pt will d/c the last SANDY and continue PO meds for now until the SANDY is washed out; he will likely benefit from resuming Invega SANDY in the future as this was previously helpful/effective     Depression: pt counseled  -resumed Wellbutrin XL at 150 mg po q day- increase to/continue at 300 mg po q day - discontinued on 2/23/23); pt appears to be getting activated with hypomania form the wellbutrin     Anxiety: pt counseled  -Patient is requesting Vistaril 50mg PO BID scheduled     Psychosocial stressors: pt counseled  -SW consulted to assist with resources     Nicotine dependence: pt counseled  -started/continue Nicotine 14 mg patch dermal q day     Polysubstance abuse: pt counseled          Discussed diagnosis, risks and benefits of proposed treatment vs alternative treatments vs no treatment, potential side effects of these treatments and the inherent unpredictability of treatment. The patient expresses understanding of the above and displays the capacity to agree with this treatment given said understanding. Patient also agrees that, currently, the benefits outweigh the risks and would like to pursue/continue treatment at this time.    Any medications being used off-label were discussed with the patient inclusive of the evidence base for the use of the medications and consent was obtained for the off-label use of the medication.       DISCHARGE PLANNING  Expected Disposition Plan: Home or Self Care      NEED FOR CONTINUED  HOSPITALIZATION  Psychiatric illness continues to pose a potential threat to life or bodily function, of self or others, thereby requiring the need for continued inpatient psychiatric hospitalization: Yes, due to: significant psychotic thought disorder, danger to self, gravely disabled, and suicidal ideation, as evidenced by:  Ongoing concerns with perceptual aberrancy and paranoid persecutory delusions leading to potential harm of self or others. -resolving    Protective inpatient pyschiatric hospitalization required while a safe disposition plan is enacted: Yes    Patient stabilized and ready for discharge from inpatient psychiatric unit: No        STAFF:   Leroy Howard III, MD  Psychiatry

## 2023-02-27 NOTE — PROGRESS NOTES
"   02/27/23 1030   [facility] Group Therapy   Group Name Education   Specific Interventions Coping Skills Training  (what do I want to change and benefits of making change)   Participation Level Sharing   Participation Quality Cooperative   Insight/Motivation Improved   Affect/Mood Display Flat   Cognition Alert   Psychomotor WNL     Patient presents cooperative, flat, in and out of group, "can I take a walk, reports "good" mood. Patient shared he wants to like himself better and get fit(loose weight), "cause I was picked on when I was younger", benefits are increase self-esteem, have women, be more courageous. Patient shared his coping skills would be to walk and eat better.  "

## 2023-02-27 NOTE — PLAN OF CARE
"  Problem: Adult Behavioral Health Plan of Care  Goal: Optimized Coping Skills in Response to Life Stressors  Outcome: Ongoing, Progressing  Intervention: Promote Effective Coping Strategies  Flowsheets (Taken 2/27/2023 2567)  Supportive Measures:   active listening utilized   counseling provided   verbalization of feelings encouraged   self-reflection promoted   Group Note:     Behavior:  Patient attended group psychotherapy today. Pt affect anxious with euthymic mood.    Intervention  Group psychotherapy today focused on attempting to identify what do they do in certain situations and how do they feel. [facility] presented the Mental Health [place] face crossword puzzle. Patients were encouraged to identify the feelings they have chosen from the words and to identify their feelings from the words to discuss a time in their life when they experienced that particular feeling.      Response:      Pt stated, "  The word I can say is balance. I think by being in here I found a balance of where I need to be."  Plan:  Patient will be encouraged to continue to attend group psychotherapy.     "

## 2023-02-27 NOTE — PLAN OF CARE
Plan of care reviewed. Denies intent to harm self or others at this time. States is still having auditory and visual hallucinations but these are decreasing in frequency. Associates the hallucinations with his disappointment in himself. Accepts snacks and medications. Gait steady, no falls. Interacting with staff and peers. Promoted individualized safety plan, reality-based interactions, effective coping strategies, and impulse control. Will continue precautions and monitor for safety.

## 2023-02-27 NOTE — PLAN OF CARE
Lying quietly in bed, eyes closed, respirations even, unlabored. Apparently asleep. Slept 9 hours thus far with 3 interruptions. Safety precautions maintained. Rounds done every 15 minutes. Bed is fixed in low position and room is uncluttered and pathways are clear.

## 2023-02-27 NOTE — PLAN OF CARE
POC ongoing. Pt at nurses station all shift. States doing great. Denies SI/HI/AH/VH. Attending groups. Med compliant.

## 2023-02-28 VITALS
RESPIRATION RATE: 18 BRPM | SYSTOLIC BLOOD PRESSURE: 131 MMHG | BODY MASS INDEX: 38.33 KG/M2 | TEMPERATURE: 97 F | HEART RATE: 78 BPM | HEIGHT: 71 IN | WEIGHT: 273.81 LBS | DIASTOLIC BLOOD PRESSURE: 61 MMHG

## 2023-02-28 PROCEDURE — S4991 NICOTINE PATCH NONLEGEND: HCPCS | Performed by: PSYCHIATRY & NEUROLOGY

## 2023-02-28 PROCEDURE — 99239 HOSP IP/OBS DSCHRG MGMT >30: CPT | Mod: ,,, | Performed by: STUDENT IN AN ORGANIZED HEALTH CARE EDUCATION/TRAINING PROGRAM

## 2023-02-28 PROCEDURE — 25000003 PHARM REV CODE 250: Performed by: PSYCHIATRY & NEUROLOGY

## 2023-02-28 PROCEDURE — 90833 PSYTX W PT W E/M 30 MIN: CPT | Mod: ,,, | Performed by: STUDENT IN AN ORGANIZED HEALTH CARE EDUCATION/TRAINING PROGRAM

## 2023-02-28 PROCEDURE — 99239 PR HOSPITAL DISCHARGE DAY,>30 MIN: ICD-10-PCS | Mod: ,,, | Performed by: STUDENT IN AN ORGANIZED HEALTH CARE EDUCATION/TRAINING PROGRAM

## 2023-02-28 PROCEDURE — 90833 PR PSYCHOTHERAPY W/PATIENT W/E&M, 30 MIN (ADD ON): ICD-10-PCS | Mod: ,,, | Performed by: STUDENT IN AN ORGANIZED HEALTH CARE EDUCATION/TRAINING PROGRAM

## 2023-02-28 RX ORDER — RISPERIDONE 4 MG/1
4 TABLET ORAL 2 TIMES DAILY
Qty: 60 TABLET | Refills: 0 | Status: ON HOLD | OUTPATIENT
Start: 2023-02-28 | End: 2023-07-03

## 2023-02-28 RX ORDER — IBUPROFEN 200 MG
1 TABLET ORAL DAILY
Qty: 28 PATCH | Refills: 0 | Status: ON HOLD | OUTPATIENT
Start: 2023-03-01 | End: 2023-07-03

## 2023-02-28 RX ADMIN — NICOTINE 1 PATCH: 14 PATCH, EXTENDED RELEASE TRANSDERMAL at 08:02

## 2023-02-28 RX ADMIN — THERA TABS 1 TABLET: TAB at 08:02

## 2023-02-28 RX ADMIN — RISPERIDONE 4 MG: 2 TABLET ORAL at 08:02

## 2023-02-28 RX ADMIN — FOLIC ACID 1 MG: 1 TABLET ORAL at 08:02

## 2023-02-28 NOTE — PROGRESS NOTES
Psychotherapy:  Target symptoms: substance abuse, psychosis  Why chosen therapy is appropriate versus another modality: relevant to diagnosis  Outcome monitoring methods: self-report  Therapeutic intervention type: supportive psychotherapy  Topics discussed/themes: building skills sets for symptom management, symptom recognition, substance abuse  The patient's response to the intervention is accepting. The patient's progress toward treatment goals is fair.   Duration of intervention: 17 minutes.

## 2023-02-28 NOTE — PSYCH
The patient will follow up with [facility], [address] [place] [id number], [phone]. An appointment has been scheduled on 2/28/2023 for a home visit between 3p -5p. The patient will receive smoking cessation classes and substance abuse counseling during his regular appointment due to a positive UDS. The after visit summary was faxed on 2/28/2023 at 12:15pm.

## 2023-02-28 NOTE — PLAN OF CARE
"Pt states that he feels "good" today, spending majority of time on telephone. Denies SI/HI. Denies hallucinations. Appetite adequate. PRN Vistaril administered to aid with sleep. Medication complaint. Remains calm and cooperative. NADN. Safety precautions remain in place.  "

## 2023-02-28 NOTE — PLAN OF CARE
Patient denies suicidal ideation and ready for discharge. Calm and cooperative. All aspects of AVS reviewed with patient and denies any needs or questions.

## 2023-02-28 NOTE — CARE UPDATE
[facility]  Encounter Date: 2023  9:10 AM    Discharge Date No discharge date for patient encounter.   Hospital Account: [id number]    MRN: [id number]   Guarantor: CARLTON BROUSSARD   Contact Serial #: [id number]         ENCOUNTER             Patient Class: IP Psych   Unit: [facility]*   Hospital Service: Psychiatry   Bed: 208 D   Admitting Provider: Leroy Howard Iii, Md   Referring Physician: Umer Meadows   Attending Provider: Leroy Howard Iii, Md   Adm Diagnosis: Psychosis [F29]      PATIENT                 Name: CARLTON BROUSSARD KASIE : 1995 (28 yrs)   Address: [address] [place] [address]* Sex: Male   City: [place], [place] [facility]       Primary Care Provider: Leroy Howard III, MD         Primary Phone: [phone]   EMERGENCY CONTACT   Contact Name Legal Guardian? Relationship to Patient Home Phone Work Phone Mobile Phone   1. JayneRussell  2. *No Contact Specified* No    Father    [phone]      GUARANTOR                  Guarantor: CARLTON BROUSSARD       : 1995   Address: [address] [place] [address]   Sex: Male     JOSE TORREZ [facility] Guarantor  Type: B/H   Relation to Patient: Self         Home Phone: [phone]   Guarantor ID: [id number]         Work Phone:     GUARANTOR EMPLOYER     Employer:             Status: STUDENT -*      COVERAGE          PRIMARY INSURANCE   Payor / Plan[place] [employer]       Group Number:         Subscriber Name: CARLTON BROUSSARD Subscriber : 1995   Subscriber ID: [id number] Pat. Rel. to Subscriber: Self   Insurance Address: [address] [place] [id number]       SECONDARY INSURANCE   Payor / Plan: - No Secondary Coverage -       Group Number:         Subscriber Name:   Subscriber :     Subscriber ID:   Pat. Rel. to Subscriber:     Insurance Address:

## 2023-02-28 NOTE — PROGRESS NOTES
"   02/28/23 1030   [facility] Group Therapy   Group Name Therapeutic Recreation   Specific Interventions Cognitive Stimulation Training   Participation Level Appropriate   Participation Quality Cooperative   Insight/Motivation Good   Affect/Mood Display Appropriate   Cognition Alert   Psychomotor WNL     Patient presents smiling, calm, cooperative, reports "good" mood, shows interest and initial ability to comprehend directions, states he feels good about discharge, encouraged to participate in leisure activities that promote good mental and good physical health, encouraged to choose from list of healthy coping skills that was given to him.  "

## 2023-02-28 NOTE — DISCHARGE SUMMARY
"Discharge Summary  Psychiatry    Admit Date: 2/17/2023    Discharge Date and Time:  02/28/2023 10:24 AM    Attending Physician: Leroy Howard III, MD     Discharge Provider: Leroy Howard III    Reason for Admission:    CHIEF COMPLAINT   Price Godoy is a 28 y.o. male with a past psychiatric history schizophrenia vs. Schizoaffective disorder  currently admitted to the inpatient unit with the following chief complaint:  bizarre behavior, suicidal ideation, "I need help socially."     HPI   The patient was seen and examined. The chart was reviewed.     The patient presented to the ER on 2/17/2023 . Per staff notes:  -Pt to ED via EMS for wandering the street and needing an ambulance. Pt has flat affect. States " when do I get to leave? I was just wandering". Pt denies SI or HI. Pt calm and cooperative-    Pt arrived via ems. Pt chief complaint is a psych eval. Per ems pt was found wandering streets found a  and stated needed an ambulance. Pt states he is schizophrenic and has been off meds   -Price Godoy is a 28 y.o male with a PMHx of anxiety, and depression, who presents to the ED via EMS for psychiatric evaluation tonight. History provided by an independent historian, EMS, who reports the patient was found wandering the streets by a  and requested an ambulance. Patient states he "is on the wrong medications, and wants medications to sleep more." Patient reports he sleeps 8 hours a day. Patient further notes he "can't be a man, can't talk sometimes, like having trouble getting his thoughts out." Patient further notes "his thinking is slow," but he is not depressed. Patient states his medications were changed recently. Patient reports nausea. No medications taken PTA. No alleviating or exacerbating factors noted. Denies headache, visual and auditory hallucinations, fever, or other associated symptoms. Patient reports he is currently homeless. Denies EtOH, or other recreational drug " "usage. Allergic to amoxicillin.  -Today on exam patient seen lying in bed looking disheveled.  Patient reports that I walk to the hospital patient states that he did this because I did not have any hope upon further enquiring what he needs by this patient endorses suicidal ideations increased hopelessness helplessness anhedonia and lack of motivation.  On exam patient seems to have increased thought blocking increased delayed responses to questions and some potential responding to internal stimuli.  Patient denied any acute psychotic symptoms denied any auditory visual hallucinations or paranoia patient reports that he is homeless and does not know how to get help with his mood and has been feeling suicidal about it.  Issue unable to provide any collateral information patient did report having act team however unsure if he has been following up with them regularly due to patient being homeless.  Patient also admitted missing his monthly long-acting depot shot he last received this last month during his inpatient hospitalization  -Per [facility]- "appears slow and slightly catatonic. Asking for help with meds." On admit Pt states he is here "so you can get me a house, apartment, or even a studio. I never had one of those. I dont have a mom or dad." States has been off meds for a long time. Appears to be RIS, slowed thought processes, and delayed responses.Denies SI/HI/AH/VH     The patient was medically cleared and admitted to the [facility].     Provided to the medical student: Patient has an extensive history of psychiatric hospitalizations. Most recently leaving [facility] 1 month ago. Patient states, "I'm just looking for a home, just been lost for a while." Patient could not elaborate further as he was easily distracted with extensive thought blocking. He states that the abilify was working for him but would prefer daily medication instead of the long acting injection. Patient states this due to, "wanting freedom to " "feel himself sometimes." He states he doesn't have any support system, transportation, or money to support himself. He has most recently been in [facility]. Patient was pushed to provide more information but repeatedly gave one word answers and changed his answers numerous times.     During the assessment, the patient was a limited historian and unreliable. He denied all symptoms, then endorsed depression. He has negative symptoms of psychosis, with noted RIS. He is homeless. -he has a long history of hospitalizations and medication noncomplaince.      Procedures Performed: * No surgery found *    Hospital Course:    Patient was admitted to the inpatient psychiatry unit after being medically cleared in the ED. Chart and labs were reviewed. The patient was stabilized as follows:      Psychosis: pt counseled  -resume Risperdal at 0.5 mg po BID- increase to 1 mg po BID- increase  2 mg po BID- increase to 2/3- increase to 3 mg po BID- increase to 3/4 today then 4 mg po BID on Saturday 2/25/23     He reports that he received a SANDY during the last hospitalization; Risperdal 120 mg sers SANDY given on about about 1/5/23 and due again on 2/5/23; he then reports that he started a new shot since his discharge form that facility  -still seeking records from his ACT  -pt will d/c the last SANDY and continue PO meds for now until the SANDY is washed out; he will likely benefit from resuming Invega SANDY in the future as this was previously helpful/effective     Depression: pt counseled  -resumed Wellbutrin XL at 150 mg po q day- increase to/continue at 300 mg po q day - discontinued on 2/23/23); pt appears to be getting activated with hypomania form the wellbutrin     Anxiety: pt counseled  -Patient is requesting Vistaril 50mg PO BID scheduled     Psychosocial stressors: pt counseled  -SW consulted to assist with resources     Nicotine dependence: pt counseled  -started/continue Nicotine 14 mg patch dermal q day     Polysubstance " abuse: pt counseled                 During hospitalization, the patient was encouraged to go to both groups and individual counseling. Patient was monitored for any side effects. A meeting was held with multidisciplinary team prior to discharge and pt's diagnosis, current medications, and follow up were discussed. The patient has been compliant with treatment and can adequately attend to activities of daily living in an independent manner. The patient denies any side effects. The patient denies SI, HI, plan or intent for self harm or harm to others. The patient is no longer a danger to self or others nor gravely disabled disabled. Patient discharged  in stable condition with scheduled outpatient follow up.      Discussed diagnosis, risks and benefits of proposed treatment vs alternative treatments vs no treatment, and potential side effects of these treatments.  The patient expresses understanding of the above and displays the capacity to agree with this treatment given said understanding.  Patient also agrees that, currently, the benefits outweigh the risks and would like to pursue treatment at this time.      Discharge MSE: stated age, casually dressed, well groomed.  No psychomotor agitation or retardation.  No abnormal involuntary movements.  Gait normal.  Speech normal, conversational.  Language fluent English. Mood fine.  Affect normal range, pleasant, euthymic.  Thought process linear.  Associations intact.  Denies suicidal or homicidal ideation.  Denies auditory hallucinations, paranoid ideation, ideas of reference.  Memory intact.  Attention intact.  Fund of knowledge intact.  Insight intact.  Judgment intact.  Alert and oriented to person, place, time.      Tobacco Usage:  Is patient a smoker? Yes  Does patient want prescription for Tobacco Cessation? Yes  Does patient want counseling for Tobacco Cessation? Yes    If patient would like to quit, then over the counter nicotine patch could be used. The  patient could also follow up with his PCP or psychiatric provider for other alternatives.       Final Diagnoses:    Principal Problem: MDD, recurrent, severe with psychotic features   Secondary Diagnoses:       Unspecified Anxiety Disorder     Psychosocial stressors     Nicotine dependence  Polysubstance abuse      Labs:  Admission on 02/17/2023   Component Date Value Ref Range Status    Cholesterol 02/18/2023 136  120 - 199 mg/dL Final    Triglycerides 02/18/2023 56  30 - 150 mg/dL Final    HDL 02/18/2023 41  40 - 75 mg/dL Final    LDL Cholesterol 02/18/2023 83.8  63.0 - 159.0 mg/dL Final    HDL/Cholesterol Ratio 02/18/2023 30.1  20.0 - 50.0 % Final    Total Cholesterol/HDL Ratio 02/18/2023 3.3  2.0 - 5.0 Final    Non-HDL Cholesterol 02/18/2023 95  mg/dL Final    Hemoglobin A1C 02/18/2023 4.6  4.0 - 5.6 % Final    Estimated Avg Glucose 02/18/2023 85  68 - 131 mg/dL Final   Admission on 02/16/2023, Discharged on 02/17/2023   Component Date Value Ref Range Status    WBC 02/16/2023 9.41  3.90 - 12.70 K/uL Final    RBC 02/16/2023 4.25 (L)  4.60 - 6.20 M/uL Final    Hemoglobin 02/16/2023 12.3 (L)  14.0 - 18.0 g/dL Final    Hematocrit 02/16/2023 37.0 (L)  40.0 - 54.0 % Final    MCV 02/16/2023 87  82 - 98 fL Final    MCH 02/16/2023 28.9  27.0 - 31.0 pg Final    MCHC 02/16/2023 33.2  32.0 - 36.0 g/dL Final    RDW 02/16/2023 11.9  11.5 - 14.5 % Final    Platelets 02/16/2023 329  150 - 450 K/uL Final    MPV 02/16/2023 9.0 (L)  9.2 - 12.9 fL Final    Immature Granulocytes 02/16/2023 0.4  0.0 - 0.5 % Final    Gran # (ANC) 02/16/2023 5.7  1.8 - 7.7 K/uL Final    Immature Grans (Abs) 02/16/2023 0.04  0.00 - 0.04 K/uL Final    Lymph # 02/16/2023 2.9  1.0 - 4.8 K/uL Final    Mono # 02/16/2023 0.6  0.3 - 1.0 K/uL Final    Eos # 02/16/2023 0.2  0.0 - 0.5 K/uL Final    Baso # 02/16/2023 0.03  0.00 - 0.20 K/uL Final    nRBC 02/16/2023 0  0 /100 WBC Final    Gran % 02/16/2023 60.6  38.0 - 73.0 % Final    Lymph % 02/16/2023 30.9  18.0 -  48.0 % Final    Mono % 02/16/2023 6.2  4.0 - 15.0 % Final    Eosinophil % 02/16/2023 1.6  0.0 - 8.0 % Final    Basophil % 02/16/2023 0.3  0.0 - 1.9 % Final    Differential Method 02/16/2023 Automated   Final    Sodium 02/16/2023 139  136 - 145 mmol/L Final    Potassium 02/16/2023 3.8  3.5 - 5.1 mmol/L Final    Chloride 02/16/2023 107  95 - 110 mmol/L Final    CO2 02/16/2023 22 (L)  23 - 29 mmol/L Final    Glucose 02/16/2023 96  70 - 110 mg/dL Final    BUN 02/16/2023 11  6 - 20 mg/dL Final    Creatinine 02/16/2023 0.9  0.5 - 1.4 mg/dL Final    Calcium 02/16/2023 8.9  8.7 - 10.5 mg/dL Final    Total Protein 02/16/2023 7.4  6.0 - 8.4 g/dL Final    Albumin 02/16/2023 4.3  3.5 - 5.2 g/dL Final    Total Bilirubin 02/16/2023 0.5  0.1 - 1.0 mg/dL Final    Alkaline Phosphatase 02/16/2023 68  55 - 135 U/L Final    AST 02/16/2023 13  10 - 40 U/L Final    ALT 02/16/2023 12  10 - 44 U/L Final    Anion Gap 02/16/2023 10  8 - 16 mmol/L Final    eGFR 02/16/2023 >60  >60 mL/min/1.73 m^2 Final    TSH 02/16/2023 1.628  0.400 - 4.000 uIU/mL Final    Specimen UA 02/17/2023 Urine, Clean Catch   Final    Color, UA 02/17/2023 Yellow  Yellow, Straw, Luz Final    Appearance, UA 02/17/2023 Clear  Clear Final    pH, UA 02/17/2023 6.0  5.0 - 8.0 Final    Specific Gravity, UA 02/17/2023 1.025  1.005 - 1.030 Final    Protein, UA 02/17/2023 Negative  Negative Final    Glucose, UA 02/17/2023 Negative  Negative Final    Ketones, UA 02/17/2023 Negative  Negative Final    Bilirubin (UA) 02/17/2023 Negative  Negative Final    Occult Blood UA 02/17/2023 Negative  Negative Final    Nitrite, UA 02/17/2023 Negative  Negative Final    Urobilinogen, UA 02/17/2023 Negative  <2.0 EU/dL Final    Leukocytes, UA 02/17/2023 Negative  Negative Final    Benzodiazepines 02/17/2023 Negative  Negative Final    Methadone metabolites 02/17/2023 Negative  Negative Final    Cocaine (Metab.) 02/17/2023 Negative  Negative Final    Opiate Scrn, Ur 02/17/2023 Negative   Negative Final    Barbiturate Screen, Ur 02/17/2023 Negative  Negative Final    Amphetamine Screen, Ur 02/17/2023 Negative  Negative Final    THC 02/17/2023 Negative  Negative Final    Phencyclidine 02/17/2023 Negative  Negative Final    Creatinine, Urine 02/17/2023 246.8  23.0 - 375.0 mg/dL Final    Toxicology Information 02/17/2023 SEE COMMENT   Final    Alcohol, Serum 02/16/2023 <10  <10 mg/dL Final    Acetaminophen (Tylenol), Serum 02/16/2023 <3.0 (L)  10.0 - 20.0 ug/mL Final    SARS-CoV-2 RNA, Amplification, Qual 02/16/2023 Negative  Negative Final         Discharged Condition: stable and improved; not currently a danger to self/others or gravely disabled    Disposition: Home or Self Care    Is patient being discharged on multiple neuroleptics? No    Follow Up/Patient Instructions:     Take all medications as prescribed.  Attend all psychiatric and medical follow up appointments.   Abstain from all drugs and alcohol.  Call the crisis line at: [phone] for help in a crisis and emergent situations or call 911 and Return to ED for any acute worsening of your condition including suicidal or homicidal ideations      Discharge Procedure Orders   Diet Adult Regular     Notify your health care provider if you experience any of the following:  temperature >100.4     Notify your health care provider if you experience any of the following:  persistent nausea and vomiting or diarrhea     Notify your health care provider if you experience any of the following:   Order Comments: Suicidal thoughts, homicidal thoughts, or any other changes in mental status  If you would like immediate help/crisis counseling, please call [phone] (TALK). Through this toll-free phone number for a network of crisis centers across the country. These centers staff their lines with people who are trained to listen and offer support to people in emotional crisis. If you are in an emergency, please call 911.     Notify your health care  provider if you experience any of the following:  increased confusion or weakness     Notify your health care provider if you experience any of the following:  persistent dizziness, light-headedness, or visual disturbances     Reason for not Prescribing Nicotine Replacement     Order Specific Question Answer Comments   Reason for not Prescribing: Patient is not a tobacco user      Activity as tolerated      Follow-up Information       [facility]. Go to.    Why: as scheduled on 2/28/2023 between 3p and 5p  Contact information:  [address] [place] [address]  [phone] PHONE  [phone] FAX                             [place] [facility]      Medications:  Reconciled Home Medications:      Medication List        START taking these medications      nicotine 14 mg/24 hr  Commonly known as: NICODERM CQ  Place 1 patch onto the skin once daily.  Start taking on: March 1, 2023     risperiDONE 4 MG tablet  Commonly known as: RISPERDAL  Take 1 tablet (4 mg total) by mouth 2 (two) times daily.  Replaces: risperiDONE 120 mg Sers            STOP taking these medications      cloNIDine 0.1 MG tablet  Commonly known as: CATAPRES     FLUoxetine 40 MG capsule     gabapentin 300 MG capsule  Commonly known as: NEURONTIN     mirtazapine 30 MG tablet  Commonly known as: REMERON     paliperidone palmitate 234 mg/1.5 mL Syrg injection  Commonly known as: INVEGA SUSTENNA     QUEtiapine 50 MG tablet  Commonly known as: SEROQUEL     risperiDONE 120 mg Sers  Replaced by: risperiDONE 4 MG tablet                Diet: regular     Activity as tolerated    Total time spent discharging patient: 35 minutes    Leroy Howard III, MD  Psychiatry

## 2023-03-01 NOTE — NURSING
Patient discharged off unit with father accompanied by HOMERO Hull. Belongings and discharge paperwork sent with sheila SIN.

## 2023-06-20 ENCOUNTER — HOSPITAL ENCOUNTER (EMERGENCY)
Facility: HOSPITAL | Age: 28
Discharge: PSYCHIATRIC OR SUBSTANCE USE FACILITY | End: 2023-06-20
Attending: EMERGENCY MEDICINE
Payer: MEDICAID

## 2023-06-20 VITALS
OXYGEN SATURATION: 98 % | BODY MASS INDEX: 38.35 KG/M2 | HEART RATE: 64 BPM | SYSTOLIC BLOOD PRESSURE: 149 MMHG | TEMPERATURE: 98 F | WEIGHT: 275 LBS | DIASTOLIC BLOOD PRESSURE: 72 MMHG | RESPIRATION RATE: 20 BRPM

## 2023-06-20 DIAGNOSIS — R45.1 AGITATION: ICD-10-CM

## 2023-06-20 DIAGNOSIS — F29 PSYCHOSIS, UNSPECIFIED PSYCHOSIS TYPE: ICD-10-CM

## 2023-06-20 DIAGNOSIS — Z00.8 MEDICAL CLEARANCE FOR PSYCHIATRIC ADMISSION: Primary | ICD-10-CM

## 2023-06-20 DIAGNOSIS — F12.90 MARIJUANA USE: ICD-10-CM

## 2023-06-20 LAB
ALBUMIN SERPL BCP-MCNC: 4.2 G/DL (ref 3.5–5.2)
ALP SERPL-CCNC: 65 U/L (ref 55–135)
ALT SERPL W/O P-5'-P-CCNC: 10 U/L (ref 10–44)
AMPHET+METHAMPHET UR QL: NEGATIVE
ANION GAP SERPL CALC-SCNC: 10 MMOL/L (ref 8–16)
APAP SERPL-MCNC: <3 UG/ML (ref 10–20)
AST SERPL-CCNC: 13 U/L (ref 10–40)
BARBITURATES UR QL SCN>200 NG/ML: NEGATIVE
BASOPHILS # BLD AUTO: 0.04 K/UL (ref 0–0.2)
BASOPHILS NFR BLD: 0.6 % (ref 0–1.9)
BENZODIAZ UR QL SCN>200 NG/ML: NEGATIVE
BILIRUB SERPL-MCNC: 0.7 MG/DL (ref 0.1–1)
BILIRUB UR QL STRIP: NEGATIVE
BUN SERPL-MCNC: 9 MG/DL (ref 6–20)
BZE UR QL SCN: NEGATIVE
CALCIUM SERPL-MCNC: 9.3 MG/DL (ref 8.7–10.5)
CANNABINOIDS UR QL SCN: ABNORMAL
CHLORIDE SERPL-SCNC: 108 MMOL/L (ref 95–110)
CLARITY UR: CLEAR
CO2 SERPL-SCNC: 22 MMOL/L (ref 23–29)
COLOR UR: YELLOW
CREAT SERPL-MCNC: 0.9 MG/DL (ref 0.5–1.4)
CREAT UR-MCNC: 193 MG/DL (ref 23–375)
CTP QC/QA: YES
DIFFERENTIAL METHOD: NORMAL
EOSINOPHIL # BLD AUTO: 0.2 K/UL (ref 0–0.5)
EOSINOPHIL NFR BLD: 2.5 % (ref 0–8)
ERYTHROCYTE [DISTWIDTH] IN BLOOD BY AUTOMATED COUNT: 12.4 % (ref 11.5–14.5)
EST. GFR  (NO RACE VARIABLE): >60 ML/MIN/1.73 M^2
ETHANOL SERPL-MCNC: <10 MG/DL
GLUCOSE SERPL-MCNC: 90 MG/DL (ref 70–110)
GLUCOSE UR QL STRIP: NEGATIVE
HCT VFR BLD AUTO: 42.4 % (ref 40–54)
HGB BLD-MCNC: 14 G/DL (ref 14–18)
HGB UR QL STRIP: NEGATIVE
IMM GRANULOCYTES # BLD AUTO: 0.02 K/UL (ref 0–0.04)
IMM GRANULOCYTES NFR BLD AUTO: 0.3 % (ref 0–0.5)
KETONES UR QL STRIP: NEGATIVE
LEUKOCYTE ESTERASE UR QL STRIP: NEGATIVE
LYMPHOCYTES # BLD AUTO: 2.1 K/UL (ref 1–4.8)
LYMPHOCYTES NFR BLD: 32.1 % (ref 18–48)
MCH RBC QN AUTO: 29.3 PG (ref 27–31)
MCHC RBC AUTO-ENTMCNC: 33 G/DL (ref 32–36)
MCV RBC AUTO: 89 FL (ref 82–98)
METHADONE UR QL SCN>300 NG/ML: NEGATIVE
MONOCYTES # BLD AUTO: 0.5 K/UL (ref 0.3–1)
MONOCYTES NFR BLD: 8 % (ref 4–15)
NEUTROPHILS # BLD AUTO: 3.7 K/UL (ref 1.8–7.7)
NEUTROPHILS NFR BLD: 56.5 % (ref 38–73)
NITRITE UR QL STRIP: NEGATIVE
NRBC BLD-RTO: 0 /100 WBC
OPIATES UR QL SCN: NEGATIVE
PCP UR QL SCN>25 NG/ML: NEGATIVE
PH UR STRIP: 6 [PH] (ref 5–8)
PLATELET # BLD AUTO: 319 K/UL (ref 150–450)
PMV BLD AUTO: 9.2 FL (ref 9.2–12.9)
POTASSIUM SERPL-SCNC: 3.9 MMOL/L (ref 3.5–5.1)
PROT SERPL-MCNC: 7.6 G/DL (ref 6–8.4)
PROT UR QL STRIP: NEGATIVE
RBC # BLD AUTO: 4.78 M/UL (ref 4.6–6.2)
SARS-COV-2 RDRP RESP QL NAA+PROBE: NEGATIVE
SODIUM SERPL-SCNC: 140 MMOL/L (ref 136–145)
SP GR UR STRIP: 1.02 (ref 1–1.03)
TOXICOLOGY INFORMATION: ABNORMAL
TSH SERPL DL<=0.005 MIU/L-ACNC: 0.41 UIU/ML (ref 0.4–4)
URN SPEC COLLECT METH UR: NORMAL
UROBILINOGEN UR STRIP-ACNC: NEGATIVE EU/DL
WBC # BLD AUTO: 6.47 K/UL (ref 3.9–12.7)

## 2023-06-20 PROCEDURE — 99213 OFFICE O/P EST LOW 20 MIN: CPT | Mod: 95,,, | Performed by: PSYCHIATRY & NEUROLOGY

## 2023-06-20 PROCEDURE — 80143 DRUG ASSAY ACETAMINOPHEN: CPT | Performed by: EMERGENCY MEDICINE

## 2023-06-20 PROCEDURE — 80053 COMPREHEN METABOLIC PANEL: CPT | Performed by: EMERGENCY MEDICINE

## 2023-06-20 PROCEDURE — 96372 THER/PROPH/DIAG INJ SC/IM: CPT | Performed by: EMERGENCY MEDICINE

## 2023-06-20 PROCEDURE — 82077 ASSAY SPEC XCP UR&BREATH IA: CPT | Performed by: EMERGENCY MEDICINE

## 2023-06-20 PROCEDURE — 81003 URINALYSIS AUTO W/O SCOPE: CPT | Performed by: EMERGENCY MEDICINE

## 2023-06-20 PROCEDURE — 87635 SARS-COV-2 COVID-19 AMP PRB: CPT | Performed by: EMERGENCY MEDICINE

## 2023-06-20 PROCEDURE — 84443 ASSAY THYROID STIM HORMONE: CPT | Performed by: EMERGENCY MEDICINE

## 2023-06-20 PROCEDURE — 63600175 PHARM REV CODE 636 W HCPCS: Performed by: EMERGENCY MEDICINE

## 2023-06-20 PROCEDURE — 85025 COMPLETE CBC W/AUTO DIFF WBC: CPT | Performed by: EMERGENCY MEDICINE

## 2023-06-20 PROCEDURE — 99213 PR OFFICE/OUTPT VISIT, EST, LEVL III, 20-29 MIN: ICD-10-PCS | Mod: 95,,, | Performed by: PSYCHIATRY & NEUROLOGY

## 2023-06-20 PROCEDURE — 80307 DRUG TEST PRSMV CHEM ANLYZR: CPT | Performed by: EMERGENCY MEDICINE

## 2023-06-20 PROCEDURE — 99285 EMERGENCY DEPT VISIT HI MDM: CPT

## 2023-06-20 RX ORDER — LORAZEPAM 2 MG/ML
2 INJECTION INTRAMUSCULAR
Status: COMPLETED | OUTPATIENT
Start: 2023-06-20 | End: 2023-06-20

## 2023-06-20 RX ORDER — DIPHENHYDRAMINE HYDROCHLORIDE 50 MG/ML
50 INJECTION INTRAMUSCULAR; INTRAVENOUS
Status: COMPLETED | OUTPATIENT
Start: 2023-06-20 | End: 2023-06-20

## 2023-06-20 RX ORDER — HALOPERIDOL 5 MG/ML
5 INJECTION INTRAMUSCULAR
Status: COMPLETED | OUTPATIENT
Start: 2023-06-20 | End: 2023-06-20

## 2023-06-20 RX ADMIN — DIPHENHYDRAMINE HYDROCHLORIDE 50 MG: 50 INJECTION, SOLUTION INTRAMUSCULAR; INTRAVENOUS at 09:06

## 2023-06-20 RX ADMIN — LORAZEPAM 2 MG: 2 INJECTION INTRAMUSCULAR; INTRAVENOUS at 09:06

## 2023-06-20 RX ADMIN — HALOPERIDOL LACTATE 5 MG: 5 INJECTION, SOLUTION INTRAMUSCULAR at 09:06

## 2023-06-20 NOTE — ED TRIAGE NOTES
"Pt arrived to ED via [facility], per officers awaiting OPC.  Officer states "mother says he is schizophrenic and off his medications. Pt states " I am not schizophrenic.".  Pt states " I am not getting blood taken out."   "

## 2023-06-20 NOTE — ED NOTES
"Tele psych- Dr Gonzalez called, but pt was unable to start consult due to feeling "drowsy", Dr. Gonzalez given brief patient summary  "

## 2023-06-20 NOTE — ED NOTES
Pt was transported with tech and security in wheelchair. Pt denied informing family of placement.

## 2023-06-20 NOTE — ED PROVIDER NOTES
"Encounter Date: 6/20/2023    SCRIBE #1 NOTE: I, Irena Lindy, am scribing for, and in the presence of,  Fiona Dolan MD.     History     Chief Complaint   Patient presents with    Psychiatric Evaluation     Pt brought in by [facility],  CEC by .  Pt history of Schizophrenia and has not been taking his medication. Unable to get pulse ox or temp.       29 yo M with a PMHx of anxiety, depression, schizophrenia, h/o psychiatric hospitalizations, presents to the ED via [facility] for psychiatric evaluation with OPC. Police states per his mother he has not been taking his antipsychotics. They picked him up outside his mother's house, refusing to talk or touched at with police. He was not agitated with police. He currently denies any SI, HI, AVH. He is currently refusing treatment, stating he "does not want blood work or shot". He denies being a schizophrenic.     Patient hx limited at this time d/t psychiatric condition. Additional history is provided by independent historian: police officers, father.     Called and LVM for mother María at 9:25 am.   Called father, Russell, who reports patient has schizophrenia, off meds, he has been showing signs of "psychosis" which he describes the patient as losing touch with reality, taling to himself, aggressive (verbally), words that don't make sense, and non cooperative. Patietn lives by himself, sometimes he works with part time job, he was with ACT team but "wasn't working well" with him x several months so they are trying to get him enrolled in a different program. Normally gets Invega- last injected in May he believes, and "a slew of other pills." He is not aware of any other medical problems and states he was allergice to amoxicillin as a child.     The history is provided by the patient, medical records and a relative. The history is limited by the condition of the patient.   Review of patient's allergies indicates:   Allergen Reactions    Amoxicillin      Past Medical History: " "  Diagnosis Date    Anxiety     Depression     History of psychiatric hospitalization 09/12/2019    [facility] 12/29/2022,05/29/2022,03/15/2021, [facility].    Suicide attempt      Past Surgical History:   Procedure Laterality Date    APPENDECTOMY       Family History   Problem Relation Age of Onset    Seizures Mother     Mental illness Brother     Paranoid behavior Maternal Uncle     Schizophrenia Maternal Uncle     Drug abuse Paternal Uncle     Alcohol abuse Paternal Uncle     Diabetes Maternal Grandmother     Depression Paternal Grandmother     Diabetes Paternal Grandmother     Physical abuse Cousin      Social History     Tobacco Use    Smoking status: Every Day     Packs/day: 1.00     Years: 2.00     Pack years: 2.00     Types: Cigarettes    Smokeless tobacco: Never    Tobacco comments:     Pt stated that he smokes 1 pack of tobacco daily.   Substance Use Topics    Alcohol use: Yes     Alcohol/week: 1.0 standard drink     Types: 1 Cans of beer per week     Comment: rarely    Drug use: Yes     Types: Marijuana, "Crack" cocaine     Comment: last use tonight     Review of Systems   Unable to perform ROS: Psychiatric disorder   Psychiatric/Behavioral:  Positive for agitation and behavioral problems. Negative for hallucinations and suicidal ideas.         (-) HI.      Physical Exam     Initial Vitals   BP Pulse Resp Temp SpO2   06/20/23 0916 06/20/23 0916 06/20/23 0916 06/20/23 0953 06/20/23 1002   132/74 85 (!) 22 97.6 °F (36.4 °C) 96 %      MAP       --                Physical Exam    Nursing note and vitals reviewed.  Constitutional: He appears well-developed and well-nourished. He is not diaphoretic. No distress.   HENT:   Head: Normocephalic and atraumatic.   Eyes: Conjunctivae are normal.   Cardiovascular:  Normal rate and regular rhythm.           Pulmonary/Chest: Breath sounds normal. No respiratory distress.     Neurological: He is alert.   Ambulatory with a steady gait, no slurred " speech or facial droop   Skin: Skin is dry.   Psychiatric: His affect is angry, blunt and labile. He is agitated. He is not actively hallucinating and not combative. He expresses no homicidal and no suicidal ideation. He is noncommunicative.   Agitated, uncooperative, answering some questions.          ED Course   Critical Care    Date/Time: 7/6/2023 7:28 AM  Performed by: Fiona Dolan MD  Authorized by: Fiona Dolan MD   Total critical care time (exclusive of procedural time) : 0 minutes  Comments: Please put in 45 minutes of critical care due to patient having a high risk of psychiatric emergency, significant agitation requiring chemical sedation.   Separate from teaching and exclusive of procedure and ekg time  Includes:  Time at bedside  Time reviewing test results  Time discussing case with staff  Time documenting the medical record  Time spent with family members  Time spent with consults  Management          Labs Reviewed   COMPREHENSIVE METABOLIC PANEL - Abnormal; Notable for the following components:       Result Value    CO2 22 (*)     All other components within normal limits   DRUG SCREEN PANEL, URINE EMERGENCY - Abnormal; Notable for the following components:    THC Presumptive Positive (*)     All other components within normal limits    Narrative:     Specimen Source->Urine   ACETAMINOPHEN LEVEL - Abnormal; Notable for the following components:    Acetaminophen (Tylenol), Serum <3.0 (*)     All other components within normal limits   CBC W/ AUTO DIFFERENTIAL   TSH   URINALYSIS, REFLEX TO URINE CULTURE    Narrative:     Specimen Source->Urine   ALCOHOL,MEDICAL (ETHANOL)   SARS-COV-2 RDRP GENE          Imaging Results    None          Medications   haloperidol lactate injection 5 mg (5 mg Intramuscular Given 6/20/23 0939)   diphenhydrAMINE injection 50 mg (50 mg Intramuscular Given 6/20/23 0954)   LORazepam injection 2 mg (2 mg Intramuscular Given 6/20/23 0917)     Medical Decision Making:    History:   Old Medical Records: I decided to obtain old medical records.  Old Records Summarized: other records.       <> Summary of Records: External documents reviewed  Initial Assessment:   27 yo M with history of schizophrenia presents on OPC with [facility] CAVAZOS.  Per [employer] officers, the patient was placed on an OPC.  When they arrived at his house, he was sitting in the front yd.  He he has been non cooperative but not physically aggressive.  History from the patient is very limited at this time.  On exam, the patient is alert, ambulatory with a steady gait.  He has a blunt affect, he gets frustrated with questioning.  He denies auditory visual hallucinations, SI or HI.  Patient is asking for a food tray.  I advised we will need to obtain blood work for medical clearance and he is declining any blood work or injections.  Patient will require chemical sedation to help facilitate workup as patient is agitated.  I will place this patient on a PEC based on my concern for grave disability, acute psychosis.  Clinical Tests:   Lab Tests: Ordered and Reviewed  ED Management:  Patient's labs reviewed, there is marijuana on his tox screen, otherwise within acceptable limits.  Patient is sleeping, wakes to voice.  Allows for physical examination.  At this time, patient is medically cleared for psychiatric evaluation and treatment.    Fiona Dolan MD   11:26 AM            Scribe Attestation:   Scribe #1: I performed the above scribed service and the documentation accurately describes the services I performed. I attest to the accuracy of the note.         Medically cleared for psychiatry placement: 6/20/2023 11:28 AM       I, Foina Dolan MD, personally performed the services described in this documentation. All medical record entries made by the scribe were at my direction and in my presence. I have reviewed the chart and agree that the record reflects my personal performance and is accurate and complete.    This  dictation has been generated using M-Modal Fluency Direct dictation; some phonetic errors may occur.       Clinical Impression:   Final diagnoses:  [Z00.8] Medical clearance for psychiatric admission (Primary)  [F29] Psychosis, unspecified psychosis type  [R45.1] Agitation  [F12.90] Marijuana use        ED Disposition Condition    Transfer to Psych Facility Stable          ED Prescriptions    None       Follow-up Information    None          Fiona Dolan MD  06/20/23 1127       Fiona Dolan MD  06/20/23 1128       Fiona Dolan MD  07/06/23 0728

## 2023-06-20 NOTE — CONSULTS
"[facility]  Psychiatry  Telepsychiatry Consult Note    Please see previous notes:    Patient agreeable to consultation via telepsychiatry.    Tele-Consultation from Psychiatry started: 6/20/2023 at 11:36 AM  The chief complaint leading to psychiatric consultation is: psychosis  This consultation was requested by Dr Dolan, the Emergency Department attending physician.  The location of the consulting psychiatrist is [place].  The patient location is  [facility] EMERGENCY DEPARTMENT   The patient arrived at the ED at: 0910    Also present with the patient at the time of the consultation: ED RN    Patient Identification:   Price Godoy is a 28 y.o. male.    Patient information was obtained from patient, past medical records, and ER records.  Patient presented involuntarily to the Emergency Department by police on OPC    Inpatient consult to Telemedicine - Psychiatry  Consult performed by: Maria D Gonzalez MD  Consult ordered by: Fiona Dolan MD      Teleconsult Time Documentation  Subjective:     History of Present Illness:  Per ED MD: "  Chief Complaint   Patient presents with    Psychiatric Evaluation       [place] [facility] by .  Pt history of Schizophrenia and has not been taking his medication. Unable to get pulse ox or temp.        29 yo M with a PMHx of anxiety, depression, schizophrenia, h/o psychiatric hospitalizations, presents to the ED via [facility] for psychiatric evaluation with [facility]. Police states per his mother he has not been taking his antipsychotics. They picked him up outside his mother's house, refusing to talk or touched at with police. He was not agitated with police. He currently denies any SI, HI, AVH. He is currently refusing treatment, stating he "does not want blood work or shot". He denies being a schizophrenic.      Patient hx limited at this time d/t psychiatric condition. Additional history is provided by independent historian: police officers, father.    " "  Called and LVM for mother María at 9:25 am.   Called father, Russell, who reports patient has schizophrenia, off meds, he has been showing signs of "psychosis" which he describes the patient as losing touch with reality, taling to himself, aggressive (verbally), words that don't make sense, and non cooperative. Roberto lives by himself, sometimes he works with part time job, he was with ACT team but "wasn't working well" with him x several months so they are trying to get him enrolled in a different program. Normally gets Invega- last injected in May he believes, and "a slew of other pills." He is not aware of any other medical problems and states he was allergice to amoxicillin as a child. "    Pt is a 27 y/o male with no significant PMH and past psychiatric h/o MDD, recurrent, severe with psychotic features, Unspecified Anxiety Disorder, Polysubstance abuse per chart BIB police on OPC as above. Chart reviewed; UDS + THC; received haldol + Ativan + Benadryl while in ED for agitation/psychosis. On exam, pt lying in bed facedown talking to himself. Does follow commands or prompting from staff to engage, asked to participate in evaluation and responded "I'm fine" without making eye contact. Per ED RN pt was uncooperative w staff, refusing care, received PRN medication and became drowsy; was told pt has been "roaming around for 2 weeks not bathing" and noncompliant w psychiatric tx; reports she observed pt's poor hygiene.     Obtained per chart w updates where applicable:  Psychiatric History:   Previous Psychiatric Hospitalizations: Yes Multiple admissions in the recent past for psychosis  Previous Medication Trials: Yes many  Previous Suicide Attempts: yes    History of Violence: yes  History of Depression: yes  History of Maria Alejandra: no   History of Auditory/Visual Hallucination yes  History of Delusions: yes  Outpatient psychiatrist (current & past): yes ACT team    Substance Abuse History:  Tobacco:Yes  Alcohol: " "Yes  Illicit Substances:Yes, h/o MDMA, THC  Detox/Rehab: Yes     Legal History: Past charges/incarcerations: Yes      Family Psychiatric History: yes brother & maternal uncle w schizophrenia      Social History:  Developmental/Childhood:Achieved all developmental milestones timely  *Education:some college  Employment Status/Finances:occasionally works part time job   Relationship Status/Sexual Orientation: single  Children: 0  Housing Status: Home alone, h/o homelessness   history:  NO  Access to gun: NO  Advent:Non-spiritual  Recreational activities:unable to assess    Psychiatric Mental Status Exam:  Arousal: awake  Sensorium/Orientation: oriented to unable to assess  Behavior/Cooperation: uncooperative, does not follow commands   Speech: nonspontaneous, normal volume, monotone  Language: unable to assess  Mood: "I'm fine"  Affect: unable to assess  Thought Process: unable to assess  Thought Content:   Auditory hallucinations: unable to assess  Visual hallucinations: unable to assess  Paranoia: unable to assess  Delusions:  unable to assess  Suicidal ideation: unable to assess  Homicidal ideation: unable to assess  Attention/Concentration:  unable to assess  Memory:    Recent: unable to assess   Remote: unable to assess  Fund of Knowledge: unable to assess  Abstract reasoning: unable to assess  Insight:poor  Judgment: poor        Past Medical History:   Past Medical History:   Diagnosis Date    Anxiety     Depression     History of psychiatric hospitalization 09/12/2019    [facility] 12/29/2022,05/29/2022,03/15/2021, 07/25/2019and [facility] 12/18/2020,09/12/2019.    Suicide attempt       Laboratory Data:   Labs Reviewed   COMPREHENSIVE METABOLIC PANEL - Abnormal; Notable for the following components:       Result Value    CO2 22 (*)     All other components within normal limits   DRUG SCREEN PANEL, URINE EMERGENCY - Abnormal; Notable for the following components:    THC Presumptive Positive (*)     All other " components within normal limits    Narrative:     Specimen Source->Urine   ACETAMINOPHEN LEVEL - Abnormal; Notable for the following components:    Acetaminophen (Tylenol), Serum <3.0 (*)     All other components within normal limits   CBC W/ AUTO DIFFERENTIAL   TSH   URINALYSIS, REFLEX TO URINE CULTURE    Narrative:     Specimen Source->Urine   ALCOHOL,MEDICAL (ETHANOL)   SARS-COV-2 RDRP GENE       Neurological History:  Seizures: No  Head trauma: Yes    Allergies:   Review of patient's allergies indicates:   Allergen Reactions    Amoxicillin        Medications in ER:   Medications   haloperidol lactate injection 5 mg (5 mg Intramuscular Given 6/20/23 0953)   diphenhydrAMINE injection 50 mg (50 mg Intramuscular Given 6/20/23 0954)   LORazepam injection 2 mg (2 mg Intramuscular Given 6/20/23 0953)       Medications at home: unable to assess    Assessment - Diagnosis - Goals:     IMPRESSION:   Unspecified schizophrenia spectrum and other psychotic disorder (h/o schizoaffective d/o depressed type vs MDD w psychotic features)  H/o Cannabis use d/o    RECOMMENDATIONS:     DISPOSITION: Once medically cleared;   Seek Involuntary Inpatient Psychiatric admission for stabilization of acute psychiatric symptoms and until a safe disposition plan is enacted.     PSYCHIATRIC MEDICATIONS  Scheduled-defer to inpatient psychiatry   PRN-Haldol 5mg + Benadryl 50mg + Ativan 2mg PO/IM q 6 for psychotic agitation. Vistaril 25 mg q6 PRN anxiety/insomnia.    LEGAL  Continue PEC because pt is gravely disabled. Please provide with 1:1 sitter.     OTHER  Attempt to reassess pt for full psychiatric interview once more compliant  Recommend EKG to check Qtc if not already done      Total time including chart review, time with patient, obtaining collateral info[if necessary/possible]: 30        More than 50% of the time was spent counseling/coordinating care    Consulting clinician was informed of the encounter and consult note.    Consultation  ended: 6/20/2023 at 12:06 PM      Maria D Gonzalez MD   Psychiatry  [facility]

## 2023-07-03 PROBLEM — F20.3 UNDIFFERENTIATED SCHIZOPHRENIA: Status: ACTIVE | Noted: 2023-07-03

## 2023-07-12 ENCOUNTER — HOSPITAL ENCOUNTER (EMERGENCY)
Facility: HOSPITAL | Age: 28
Discharge: PSYCHIATRIC OR SUBSTANCE USE FACILITY | End: 2023-07-13
Attending: EMERGENCY MEDICINE
Payer: MEDICAID

## 2023-07-12 DIAGNOSIS — Z00.8 MEDICAL CLEARANCE FOR PSYCHIATRIC ADMISSION: Primary | ICD-10-CM

## 2023-07-12 DIAGNOSIS — F29 PSYCHOSIS, UNSPECIFIED PSYCHOSIS TYPE: ICD-10-CM

## 2023-07-12 DIAGNOSIS — R09.A2 FOREIGN BODY SENSATION IN THROAT: ICD-10-CM

## 2023-07-12 LAB
ALBUMIN SERPL BCP-MCNC: 4.2 G/DL (ref 3.5–5.2)
ALP SERPL-CCNC: 78 U/L (ref 55–135)
ALT SERPL W/O P-5'-P-CCNC: 90 U/L (ref 10–44)
AMPHET+METHAMPHET UR QL: NEGATIVE
ANION GAP SERPL CALC-SCNC: 9 MMOL/L (ref 8–16)
APAP SERPL-MCNC: <3 UG/ML (ref 10–20)
AST SERPL-CCNC: 52 U/L (ref 10–40)
BARBITURATES UR QL SCN>200 NG/ML: NEGATIVE
BASOPHILS # BLD AUTO: 0.02 K/UL (ref 0–0.2)
BASOPHILS NFR BLD: 0.3 % (ref 0–1.9)
BENZODIAZ UR QL SCN>200 NG/ML: NEGATIVE
BILIRUB SERPL-MCNC: 0.5 MG/DL (ref 0.1–1)
BILIRUB UR QL STRIP: NEGATIVE
BUN SERPL-MCNC: 11 MG/DL (ref 6–20)
BZE UR QL SCN: NEGATIVE
CALCIUM SERPL-MCNC: 9.5 MG/DL (ref 8.7–10.5)
CANNABINOIDS UR QL SCN: ABNORMAL
CHLORIDE SERPL-SCNC: 104 MMOL/L (ref 95–110)
CLARITY UR REFRACT.AUTO: CLEAR
CO2 SERPL-SCNC: 24 MMOL/L (ref 23–29)
COLOR UR AUTO: YELLOW
CREAT SERPL-MCNC: 0.8 MG/DL (ref 0.5–1.4)
CREAT UR-MCNC: 271 MG/DL (ref 23–375)
DIFFERENTIAL METHOD: ABNORMAL
EOSINOPHIL # BLD AUTO: 0.1 K/UL (ref 0–0.5)
EOSINOPHIL NFR BLD: 2.2 % (ref 0–8)
ERYTHROCYTE [DISTWIDTH] IN BLOOD BY AUTOMATED COUNT: 12.5 % (ref 11.5–14.5)
EST. GFR  (NO RACE VARIABLE): >60 ML/MIN/1.73 M^2
ETHANOL SERPL-MCNC: <10 MG/DL
GLUCOSE SERPL-MCNC: 88 MG/DL (ref 70–110)
GLUCOSE UR QL STRIP: NEGATIVE
GROUP A STREP, MOLECULAR: NEGATIVE
HCT VFR BLD AUTO: 38.4 % (ref 40–54)
HGB BLD-MCNC: 12.5 G/DL (ref 14–18)
HGB UR QL STRIP: NEGATIVE
IMM GRANULOCYTES # BLD AUTO: 0.05 K/UL (ref 0–0.04)
IMM GRANULOCYTES NFR BLD AUTO: 0.8 % (ref 0–0.5)
KETONES UR QL STRIP: NEGATIVE
LEUKOCYTE ESTERASE UR QL STRIP: NEGATIVE
LYMPHOCYTES # BLD AUTO: 2.3 K/UL (ref 1–4.8)
LYMPHOCYTES NFR BLD: 35.5 % (ref 18–48)
MCH RBC QN AUTO: 28.9 PG (ref 27–31)
MCHC RBC AUTO-ENTMCNC: 32.6 G/DL (ref 32–36)
MCV RBC AUTO: 89 FL (ref 82–98)
METHADONE UR QL SCN>300 NG/ML: NEGATIVE
MONOCYTES # BLD AUTO: 0.6 K/UL (ref 0.3–1)
MONOCYTES NFR BLD: 9.4 % (ref 4–15)
NEUTROPHILS # BLD AUTO: 3.3 K/UL (ref 1.8–7.7)
NEUTROPHILS NFR BLD: 51.8 % (ref 38–73)
NITRITE UR QL STRIP: NEGATIVE
NRBC BLD-RTO: 0 /100 WBC
OPIATES UR QL SCN: NEGATIVE
PCP UR QL SCN>25 NG/ML: NEGATIVE
PH UR STRIP: 7 [PH] (ref 5–8)
PLATELET # BLD AUTO: 293 K/UL (ref 150–450)
PMV BLD AUTO: 8.9 FL (ref 9.2–12.9)
POTASSIUM SERPL-SCNC: 3.6 MMOL/L (ref 3.5–5.1)
PROT SERPL-MCNC: 7.7 G/DL (ref 6–8.4)
PROT UR QL STRIP: ABNORMAL
RBC # BLD AUTO: 4.32 M/UL (ref 4.6–6.2)
SODIUM SERPL-SCNC: 137 MMOL/L (ref 136–145)
SP GR UR STRIP: 1.03 (ref 1–1.03)
TOXICOLOGY INFORMATION: ABNORMAL
TSH SERPL DL<=0.005 MIU/L-ACNC: 0.7 UIU/ML (ref 0.4–4)
URN SPEC COLLECT METH UR: ABNORMAL
WBC # BLD AUTO: 6.36 K/UL (ref 3.9–12.7)

## 2023-07-12 PROCEDURE — 80307 DRUG TEST PRSMV CHEM ANLYZR: CPT | Performed by: EMERGENCY MEDICINE

## 2023-07-12 PROCEDURE — 99285 EMERGENCY DEPT VISIT HI MDM: CPT | Mod: 25

## 2023-07-12 PROCEDURE — 96374 THER/PROPH/DIAG INJ IV PUSH: CPT

## 2023-07-12 PROCEDURE — 85025 COMPLETE CBC W/AUTO DIFF WBC: CPT | Performed by: EMERGENCY MEDICINE

## 2023-07-12 PROCEDURE — 80053 COMPREHEN METABOLIC PANEL: CPT | Performed by: EMERGENCY MEDICINE

## 2023-07-12 PROCEDURE — 80143 DRUG ASSAY ACETAMINOPHEN: CPT | Performed by: EMERGENCY MEDICINE

## 2023-07-12 PROCEDURE — 82077 ASSAY SPEC XCP UR&BREATH IA: CPT | Performed by: EMERGENCY MEDICINE

## 2023-07-12 PROCEDURE — 84443 ASSAY THYROID STIM HORMONE: CPT | Performed by: EMERGENCY MEDICINE

## 2023-07-12 PROCEDURE — 25500020 PHARM REV CODE 255: Performed by: EMERGENCY MEDICINE

## 2023-07-12 PROCEDURE — 87651 STREP A DNA AMP PROBE: CPT | Performed by: EMERGENCY MEDICINE

## 2023-07-12 PROCEDURE — 63600175 PHARM REV CODE 636 W HCPCS: Performed by: EMERGENCY MEDICINE

## 2023-07-12 PROCEDURE — 81003 URINALYSIS AUTO W/O SCOPE: CPT | Mod: 59 | Performed by: EMERGENCY MEDICINE

## 2023-07-12 RX ORDER — DROPERIDOL 2.5 MG/ML
0.62 INJECTION, SOLUTION INTRAMUSCULAR; INTRAVENOUS
Status: COMPLETED | OUTPATIENT
Start: 2023-07-12 | End: 2023-07-12

## 2023-07-12 RX ADMIN — IOHEXOL 75 ML: 350 INJECTION, SOLUTION INTRAVENOUS at 08:07

## 2023-07-12 RX ADMIN — DROPERIDOL 0.62 MG: 2.5 INJECTION, SOLUTION INTRAMUSCULAR; INTRAVENOUS at 07:07

## 2023-07-12 NOTE — ED NOTES
Pt belongings include: tshirt, shorts, slides, and socks.  Belongings in safe bag #[id number] include: wallet, cell phone, ID, and keys

## 2023-07-13 VITALS
RESPIRATION RATE: 20 BRPM | TEMPERATURE: 98 F | SYSTOLIC BLOOD PRESSURE: 108 MMHG | HEART RATE: 68 BPM | DIASTOLIC BLOOD PRESSURE: 62 MMHG | OXYGEN SATURATION: 94 %

## 2023-07-13 NOTE — ED PROVIDER NOTES
Conjuntivae and eyelids appear normal,  Sclerae : White without injection  Encounter Date: 7/12/2023       History     Chief Complaint   Patient presents with    Psychiatric Evaluation     Pt swallowed a cigarette butt 6 months ago in order to get closer to Danyel. Denies SI and HI. Today he wants to make sure that the butt is unobstructed.     29 yo M with pmhx schizoaffective disorder, depression, anxiety presents with a chief complaint of sore throat.  Patient notes that he chewed a cigarette butt 3-5 months ago.  Since that time he is had fullness in his left neck just below his Lucho's apple.  He notes these symptoms are due to the cigarette butt which he believes is caught in his throat.  He notes he is brought this up to doctors in the past that have attributed it to his schizophrenia. Patient does not feel this is the case.  He notes symptoms worsened when eating.  He reports associated auditory and visual hallucinations.  He denies suicidal or homicidal ideations.  He was last admitted a [facility] hospital from 6/20-7/3.  Patient notes he received a depot injection of his antipsychotics but was never established for a follow-up appointment.  He does not feel he warrants psychiatric hospitalization.  He notes that he lives independently and was unable to provide a  for collateral history.  Patient notes he has a history of a chicken bone in his throat that required a scope but the scope found that the chicken bone was not there and he just irritated his throat.  He does not state the pain is worse today than usual but he felt that today is the appropriate day to be in the ER for investigation.    The history is provided by the patient.   Review of patient's allergies indicates:   Allergen Reactions    Amoxicillin      Past Medical History:   Diagnosis Date    Anxiety     Depression     History of psychiatric hospitalization 09/12/2019    [facility] 12/29/2022,05/29/2022,03/15/2021, 07/25/2019and [facility] 12/18/2020,09/12/2019.    Suicide attempt      Past Surgical History:  "  Procedure Laterality Date    APPENDECTOMY       Family History   Problem Relation Age of Onset    Seizures Mother     Mental illness Brother     Paranoid behavior Maternal Uncle     Schizophrenia Maternal Uncle     Drug abuse Paternal Uncle     Alcohol abuse Paternal Uncle     Diabetes Maternal Grandmother     Depression Paternal Grandmother     Diabetes Paternal Grandmother     Physical abuse Cousin      Social History     Tobacco Use    Smoking status: Every Day     Packs/day: 1.00     Years: 2.00     Pack years: 2.00     Types: Cigarettes    Smokeless tobacco: Never    Tobacco comments:     Pt stated that he smokes 1 pack of tobacco daily.   Substance Use Topics    Alcohol use: Yes     Alcohol/week: 1.0 standard drink     Types: 1 Cans of beer per week     Comment: rarely    Drug use: Yes     Types: Marijuana, "Crack" cocaine     Comment: last use tonight     Review of Systems    Physical Exam     Initial Vitals [07/12/23 1820]   BP Pulse Resp Temp SpO2   137/88 81 16 99.3 °F (37.4 °C) 100 %      MAP       --         Physical Exam    Nursing note and vitals reviewed.  Constitutional: He appears well-developed and well-nourished. He is not diaphoretic. No distress.   HENT:   Head: Normocephalic and atraumatic.   Phonation normal  Handling secretions  L tonsillar enlargement without exudates  Uvula midline   Eyes: Right eye exhibits no discharge. Left eye exhibits no discharge. No scleral icterus.   Neck: Neck supple. No JVD present.   Normal range of motion.  Cardiovascular:  Normal rate, regular rhythm and normal heart sounds.     Exam reveals no gallop and no friction rub.       No murmur heard.  Pulmonary/Chest: Breath sounds normal. No stridor. No respiratory distress. He has no wheezes. He has no rhonchi. He has no rales.   Abdominal: Abdomen is soft. He exhibits no distension and no mass. There is no abdominal tenderness. There is no rebound and no guarding.   Musculoskeletal:         General: No " tenderness. Normal range of motion.      Cervical back: Normal range of motion and neck supple.     Neurological: He is alert and oriented to person, place, and time. He has normal strength. No sensory deficit.   Skin: Skin is warm and dry. Capillary refill takes less than 2 seconds.   Psychiatric: His speech is normal. His affect is blunt. Thought content is delusional. Thought content is not paranoid. He expresses no homicidal and no suicidal ideation.   Intermittent lip-smacking       ED Course   Procedures  Labs Reviewed   CBC W/ AUTO DIFFERENTIAL - Abnormal; Notable for the following components:       Result Value    RBC 4.32 (*)     Hemoglobin 12.5 (*)     Hematocrit 38.4 (*)     MPV 8.9 (*)     Immature Granulocytes 0.8 (*)     Immature Grans (Abs) 0.05 (*)     All other components within normal limits   COMPREHENSIVE METABOLIC PANEL - Abnormal; Notable for the following components:    AST 52 (*)     ALT 90 (*)     All other components within normal limits   URINALYSIS, REFLEX TO URINE CULTURE - Abnormal; Notable for the following components:    Protein, UA Trace (*)     All other components within normal limits    Narrative:     Specimen Source->Urine   DRUG SCREEN PANEL, URINE EMERGENCY - Abnormal; Notable for the following components:    THC Presumptive Positive (*)     All other components within normal limits    Narrative:     Specimen Source->Urine   ACETAMINOPHEN LEVEL - Abnormal; Notable for the following components:    Acetaminophen (Tylenol), Serum <3.0 (*)     All other components within normal limits   GROUP A STREP, MOLECULAR   TSH   ALCOHOL,MEDICAL (ETHANOL)          Imaging Results              CT Soft Tissue Neck With Contrast (In process)                      Medications   droPERidol injection 0.625 mg (0.625 mg Intravenous Given 7/12/23 1951)   iohexoL (OMNIPAQUE 350) injection 75 mL (75 mLs Intravenous Given 7/12/23 2051)     Medical Decision Making:   History:   Old Medical Records: I  decided to obtain old medical records.  Initial Assessment:   27 yo M with pmhx schizoaffective disorder, depression, anxiety presents with a chief complaint of sore throat and suspicion that a cigarette but is caught in it.  He also has auditory and visual hallucinations.  Differential Diagnosis:   Tonsillitis, pharyngitis, abscess, grave disability  Clinical Tests:   Lab Tests: Ordered  Radiological Study: Ordered  ED Management:  Patient has no evidence of airway instability.  Will test for strep.  Will obtain CT neck with contrast.  Will obtain psych screening labs and complete pec for grave disability given the patient's hallucinations.  Will obtain psych consult.    Reassessment:  Pull to bedside by nurse who noted patient is having episodes of repeated lip-smacking and jerking head from left to right.  These are intermittent and irregular.  He is not responsive during these episodes but no seizure activity.  Do not suspect dystonic reaction given rhythmic and bilateral. Will administer low-dose droperidol.                 Reassessment:   CBC without leukocytosis.  Mild anemia with a hemoglobin 12.5.  CMP with mild elevation in LFTs with an AST of 52 and an ALT at 90.  TSH is normal.  Ethanol undetectable.  Acetaminophen undetectable.  UA negative for infection.  Urine tox screen is positive for THC. Status post droperidol, patient resting comfortably.  He is following commands and acting normally.  I suspect that previous movements were secondary to his schizoaffective disorder and not any acute tox or medical emergency.  His phonation is completely normal and he is handling secretions.  CT negative for any emergent abnormalities on my initial read but I am waiting for Radiology evaluation.  Awaiting Psychiatry for their recommendations.  At this time, my shift is coming to a close and the patient was signed out to incoming MD. With regards to his throat pain, I do not feel there is any airway instability and  if symptoms persist, he may follow-up outpatient with ENT for nasopharyngoscopy.  Referral was provided.       Clinical Impression:   Final diagnoses:  [Z00.8] Medical clearance for psychiatric admission (Primary)  [F29] Psychosis, unspecified psychosis type  [R09.89] Foreign body sensation in throat        ED Disposition Condition    Transfer to Psych Facility Stable          ED Prescriptions    None       Follow-up Information       Follow up With Specialties Details Why Contact Info Additional Information    David Johnson - [facility] Otolaryngology Schedule an appointment as soon as possible for a visit   [address] [place] [id number] [phone] [facility] - Main Building, 4th Floor Please park in [facility] and use Clinic elevator    Leroy Howard III, MD Psychiatry   [address] [place] [id number] [phone]       David chepe - Emergency Dept Emergency Medicine  As needed, If symptoms worsen [facility] [facility] [id number] [phone]              Christopher Fuentes MD  07/12/23 2056

## 2023-07-13 NOTE — PROVIDER PROGRESS NOTES - EMERGENCY DEPT.
Encounter Date: 2023    ED Physician Progress Notes        Physician Note:   S/o Dr. Fuentes:  28M with PMH sczaffective d/o presenting with FB sensation for 5 mo and auditory hallucinations, no SI or HI, d/c from psych facility 10 days ago. On PEC for grave disability pending CT neck and psych eval.     10:24 PM  CT shows mild pharyngitis, no foreign body or abscess, strep test negative and likely viral.  Patient evaluated by psychiatry resident and she feels PEC should remain in place, but she will discuss with her attending.    11:58 PM  Psych Attg feels PEC should remain and pt be txf to inpatient psychiatry service.

## 2023-07-13 NOTE — ED NOTES
"The patient is recv'd lying prone in bed, eyes closed, rise/fall of chest noted.He is attired in [facility] provided scrubs w/ fair grooming & hygiene.He responds to the call of his name. He denies S/HI, A/VH. When asked the reason for his presentation he declined stating, " I don't wanna' talk about it." His affect is flat, mood low. He is void of any complaints, denies any needs/concerns. DVC is maintained, sitter present.  "
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
## 2023-07-13 NOTE — ED NOTES
Patient is transferred via [facility] ambulance, secured to [facility] w/o difficulty. One belongings bag & a valuable envelope is given to [facility] staff. The patient did not wish for anyone to be notified of his transfer. He departed w/o any complaints/concerns.

## 2023-07-13 NOTE — ED NOTES
Upon assessment of the pt, pt not answering questions. At this time, pt having episodes of repeated lip-smacking and jerking head from left to right. Pt not responsive during this activity, MD Fuentes made aware and r/o seizure activity.

## 2023-07-22 ENCOUNTER — HOSPITAL ENCOUNTER (EMERGENCY)
Facility: HOSPITAL | Age: 28
Discharge: HOME | End: 2023-07-22
Attending: EMERGENCY MEDICINE
Payer: MEDICAID

## 2023-07-22 VITALS
HEIGHT: 71 IN | RESPIRATION RATE: 16 BRPM | OXYGEN SATURATION: 96 % | BODY MASS INDEX: 31.36 KG/M2 | DIASTOLIC BLOOD PRESSURE: 94 MMHG | HEART RATE: 88 BPM | WEIGHT: 224 LBS | TEMPERATURE: 98 F | SYSTOLIC BLOOD PRESSURE: 140 MMHG

## 2023-07-22 DIAGNOSIS — R09.A2 FOREIGN BODY SENSATION IN THROAT: Primary | ICD-10-CM

## 2023-07-22 PROCEDURE — 25000003 PHARM REV CODE 250: Performed by: EMERGENCY MEDICINE

## 2023-07-22 PROCEDURE — 99283 EMERGENCY DEPT VISIT LOW MDM: CPT

## 2023-07-22 RX ORDER — MAG HYDROX/ALUMINUM HYD/SIMETH 200-200-20
30 SUSPENSION, ORAL (FINAL DOSE FORM) ORAL ONCE
Status: COMPLETED | OUTPATIENT
Start: 2023-07-22 | End: 2023-07-22

## 2023-07-22 RX ORDER — LIDOCAINE HYDROCHLORIDE 20 MG/ML
15 SOLUTION OROPHARYNGEAL ONCE
Status: DISCONTINUED | OUTPATIENT
Start: 2023-07-22 | End: 2023-07-22

## 2023-07-22 RX ORDER — ZIPRASIDONE HYDROCHLORIDE 20 MG/1
20 CAPSULE ORAL 2 TIMES DAILY
Status: ON HOLD | COMMUNITY
Start: 2023-07-20 | End: 2023-08-15 | Stop reason: HOSPADM

## 2023-07-22 RX ADMIN — ALUMINUM HYDROXIDE, MAGNESIUM HYDROXIDE, AND DIMETHICONE 30 ML: 200; 20; 200 SUSPENSION ORAL at 04:07
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
## 2023-08-07 ENCOUNTER — HOSPITAL ENCOUNTER (EMERGENCY)
Facility: OTHER | Age: 28
Discharge: PSYCHIATRIC OR SUBSTANCE USE FACILITY | End: 2023-08-07
Attending: EMERGENCY MEDICINE
Payer: MEDICAID

## 2023-08-07 ENCOUNTER — HOSPITAL ENCOUNTER (INPATIENT)
Facility: HOSPITAL | Age: 28
LOS: 8 days | Discharge: HOME | DRG: 885 | End: 2023-08-15
Attending: PSYCHIATRY & NEUROLOGY | Admitting: PSYCHIATRY & NEUROLOGY
Payer: MEDICAID

## 2023-08-07 VITALS
SYSTOLIC BLOOD PRESSURE: 131 MMHG | DIASTOLIC BLOOD PRESSURE: 76 MMHG | HEIGHT: 71 IN | BODY MASS INDEX: 37.8 KG/M2 | RESPIRATION RATE: 16 BRPM | HEART RATE: 93 BPM | WEIGHT: 270 LBS | OXYGEN SATURATION: 97 % | TEMPERATURE: 99 F

## 2023-08-07 DIAGNOSIS — F25.9 SCHIZOAFFECTIVE DISORDER, UNSPECIFIED TYPE: ICD-10-CM

## 2023-08-07 DIAGNOSIS — R45.850 HOMICIDAL IDEATION: ICD-10-CM

## 2023-08-07 DIAGNOSIS — F23 ACUTE PSYCHOSIS: Primary | ICD-10-CM

## 2023-08-07 DIAGNOSIS — F20.3 UNDIFFERENTIATED SCHIZOPHRENIA: Primary | ICD-10-CM

## 2023-08-07 LAB
ALBUMIN SERPL BCP-MCNC: 4.2 G/DL (ref 3.5–5.2)
ALP SERPL-CCNC: 75 U/L (ref 55–135)
ALT SERPL W/O P-5'-P-CCNC: 28 U/L (ref 10–44)
AMPHET+METHAMPHET UR QL: NEGATIVE
ANION GAP SERPL CALC-SCNC: 10 MMOL/L (ref 8–16)
APAP SERPL-MCNC: <3 UG/ML (ref 10–20)
AST SERPL-CCNC: 17 U/L (ref 10–40)
BARBITURATES UR QL SCN>200 NG/ML: NEGATIVE
BASOPHILS # BLD AUTO: 0.03 K/UL (ref 0–0.2)
BASOPHILS NFR BLD: 0.4 % (ref 0–1.9)
BENZODIAZ UR QL SCN>200 NG/ML: NEGATIVE
BILIRUB SERPL-MCNC: 0.4 MG/DL (ref 0.1–1)
BILIRUB UR QL STRIP: NEGATIVE
BUN SERPL-MCNC: 9 MG/DL (ref 6–20)
BZE UR QL SCN: NEGATIVE
CALCIUM SERPL-MCNC: 9.1 MG/DL (ref 8.7–10.5)
CANNABINOIDS UR QL SCN: ABNORMAL
CHLORIDE SERPL-SCNC: 108 MMOL/L (ref 95–110)
CLARITY UR: CLEAR
CO2 SERPL-SCNC: 22 MMOL/L (ref 23–29)
COLOR UR: YELLOW
CREAT SERPL-MCNC: 0.9 MG/DL (ref 0.5–1.4)
CREAT UR-MCNC: 412.4 MG/DL (ref 23–375)
DIFFERENTIAL METHOD: ABNORMAL
EOSINOPHIL # BLD AUTO: 0.1 K/UL (ref 0–0.5)
EOSINOPHIL NFR BLD: 1.8 % (ref 0–8)
ERYTHROCYTE [DISTWIDTH] IN BLOOD BY AUTOMATED COUNT: 12.3 % (ref 11.5–14.5)
EST. GFR  (NO RACE VARIABLE): >60 ML/MIN/1.73 M^2
ETHANOL SERPL-MCNC: <10 MG/DL
GLUCOSE SERPL-MCNC: 89 MG/DL (ref 70–110)
GLUCOSE UR QL STRIP: NEGATIVE
HCT VFR BLD AUTO: 39.2 % (ref 40–54)
HCV AB SERPL QL IA: NEGATIVE
HGB BLD-MCNC: 12.9 G/DL (ref 14–18)
HGB UR QL STRIP: NEGATIVE
HIV 1+2 AB+HIV1 P24 AG SERPL QL IA: NEGATIVE
IMM GRANULOCYTES # BLD AUTO: 0.02 K/UL (ref 0–0.04)
IMM GRANULOCYTES NFR BLD AUTO: 0.3 % (ref 0–0.5)
KETONES UR QL STRIP: NEGATIVE
LEUKOCYTE ESTERASE UR QL STRIP: NEGATIVE
LYMPHOCYTES # BLD AUTO: 2.8 K/UL (ref 1–4.8)
LYMPHOCYTES NFR BLD: 38.4 % (ref 18–48)
MCH RBC QN AUTO: 29.1 PG (ref 27–31)
MCHC RBC AUTO-ENTMCNC: 32.9 G/DL (ref 32–36)
MCV RBC AUTO: 88 FL (ref 82–98)
METHADONE UR QL SCN>300 NG/ML: NEGATIVE
MONOCYTES # BLD AUTO: 0.6 K/UL (ref 0.3–1)
MONOCYTES NFR BLD: 8.1 % (ref 4–15)
NEUTROPHILS # BLD AUTO: 3.8 K/UL (ref 1.8–7.7)
NEUTROPHILS NFR BLD: 51 % (ref 38–73)
NITRITE UR QL STRIP: NEGATIVE
NRBC BLD-RTO: 0 /100 WBC
OPIATES UR QL SCN: NEGATIVE
PCP UR QL SCN>25 NG/ML: NEGATIVE
PH UR STRIP: 6 [PH] (ref 5–8)
PLATELET # BLD AUTO: 281 K/UL (ref 150–450)
PMV BLD AUTO: 9.4 FL (ref 9.2–12.9)
POTASSIUM SERPL-SCNC: 3.4 MMOL/L (ref 3.5–5.1)
PROT SERPL-MCNC: 7.4 G/DL (ref 6–8.4)
PROT UR QL STRIP: ABNORMAL
RBC # BLD AUTO: 4.44 M/UL (ref 4.6–6.2)
SODIUM SERPL-SCNC: 140 MMOL/L (ref 136–145)
SP GR UR STRIP: >1.03 (ref 1–1.03)
T4 FREE SERPL-MCNC: 1.01 NG/DL (ref 0.71–1.51)
TOXICOLOGY INFORMATION: ABNORMAL
TSH SERPL DL<=0.005 MIU/L-ACNC: 0.21 UIU/ML (ref 0.4–4)
URN SPEC COLLECT METH UR: ABNORMAL
UROBILINOGEN UR STRIP-ACNC: ABNORMAL EU/DL
WBC # BLD AUTO: 7.39 K/UL (ref 3.9–12.7)

## 2023-08-07 PROCEDURE — 99285 EMERGENCY DEPT VISIT HI MDM: CPT

## 2023-08-07 PROCEDURE — 99215 PR OFFICE/OUTPT VISIT, EST, LEVL V, 40-54 MIN: ICD-10-PCS | Mod: 95,,, | Performed by: PSYCHIATRY & NEUROLOGY

## 2023-08-07 PROCEDURE — 11400000 HC PSYCH PRIVATE ROOM

## 2023-08-07 PROCEDURE — 81003 URINALYSIS AUTO W/O SCOPE: CPT | Mod: 59 | Performed by: EMERGENCY MEDICINE

## 2023-08-07 PROCEDURE — 86803 HEPATITIS C AB TEST: CPT | Performed by: EMERGENCY MEDICINE

## 2023-08-07 PROCEDURE — 84443 ASSAY THYROID STIM HORMONE: CPT | Performed by: EMERGENCY MEDICINE

## 2023-08-07 PROCEDURE — 99215 OFFICE O/P EST HI 40 MIN: CPT | Mod: 95,,, | Performed by: PSYCHIATRY & NEUROLOGY

## 2023-08-07 PROCEDURE — 80307 DRUG TEST PRSMV CHEM ANLYZR: CPT | Performed by: EMERGENCY MEDICINE

## 2023-08-07 PROCEDURE — 80053 COMPREHEN METABOLIC PANEL: CPT | Performed by: EMERGENCY MEDICINE

## 2023-08-07 PROCEDURE — 82077 ASSAY SPEC XCP UR&BREATH IA: CPT | Performed by: EMERGENCY MEDICINE

## 2023-08-07 PROCEDURE — 84439 ASSAY OF FREE THYROXINE: CPT | Performed by: EMERGENCY MEDICINE

## 2023-08-07 PROCEDURE — 87389 HIV-1 AG W/HIV-1&-2 AB AG IA: CPT | Performed by: EMERGENCY MEDICINE

## 2023-08-07 PROCEDURE — 80143 DRUG ASSAY ACETAMINOPHEN: CPT | Performed by: EMERGENCY MEDICINE

## 2023-08-07 PROCEDURE — 85025 COMPLETE CBC W/AUTO DIFF WBC: CPT | Performed by: EMERGENCY MEDICINE

## 2023-08-07 NOTE — ED NOTES
Pt changed into blue scrubs and gripper socks, no valuables with pt, sofia Howell at bedside for direct visual observation, currently calm and cooperative.
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
## 2023-08-07 NOTE — DISCHARGE INSTRUCTIONS
Thank you for allowing me to participate as part of your health care team, and thank you for choosing [facility].    HUNG SR MD  Board Certified in Psychiatry & Addiction Medicine      IN CASE OF SUICIDAL THINKING, call the National Suicide Hotline Number: 988    988 Suicide & Crisis Lifeline: 988 , 1-420-946-TALK (8255)  [email or web address]           AFTER VISIT INSTRUCTIONS:     [x] Take all medication, from all providers, as prescribed.  [x] If questions or concerns arise, or if experiencing side effects, adverse reactions or worsening symptoms, contact your provider through the [facility] portal at [email or web address], or call [phone] to reach the [facility] main line.  [x] In cases of emergencies, call [phone] or [phone], or present directly to the emergency department for immediate assistance.      INFORMATION ON MENTAL HEALTH MEDICATIONS:     National Magazine of Mental Health:   https://www.nimh.nih.gov/health/topics/mental-health-medications     Web MD:   https://www.Audaster.Qu[employer] Infrastructure       RESOURCES:     IN CASE OF SUICIDAL THINKING, call the [facility] Suicide Hotline Number: 988    988 Suicide & Crisis Lifeline: 988 , 8-686-883-TALK (8255)  Provides 24/7, free and confidential support for people in distress, prevention and crisis resources for you or your loved ones, and best practices for professionals.    Call, text or chat.  https://TriCipher.org     National Action [place] for Suicide Prevention: the National [place] for Suicide Prevention (Action [place]) is the nations public-private partnership for suicide prevention, working with more than 250 national partners.   https://theactionalliance.org     National Strategy for Suicide Prevention & Risk Mitigation:  https://theactionalliance.org/our-strategy/national-strategy-suicide-prevention     [x] Fact Sheet:   https://www.hhs.gov/sites/default/files/national-strategy-for-suicide-prevention-factsheet.pdf     [x] Report:    https://www.ncbi.nlm.nih.gov/books/PXQ725380/pdf/Bookshelf_NBK109917.pdf     Suicide Prevention Resource Center: The Suicide Prevention Resource Center (SPR) is the only federally supported resource center devoted to advancing the implementation of the National Strategy for Suicide Prevention. [facility] is funded by the U.S. Department of Health and Human Services' Substance Abuse and Mental Health Services Administration (SAMA).  https://www.[facility].org     [x] Safety Plan:   https://MJJ Sales/wp-content/uploads/2021/08/Zach-Safety-Plan-8-6-21.pdf     [x] Suicide Risk Curve:  https://MJJ Sales/wp-content/uploads/2021/08/Jrbuqke-dunu-vqdfs-8-6-21.pdf     [place] Mental Health Advocacy Service: the state agency tasked with protecting the legal rights of people with behavioral health diagnoses.  https://mhas.[place].[facility]     Alcoholics Anonymous (AA): find a meeting near you.  https://www.aa.org     SMI Adviser: resources for individuals and families with serious mental illness.  https://smiadviser.org     National [place] for the Mentally Ill (GABRIELE): the nation's largest grassroots organization dedicated to building better lives for individuals with mental illness.  https://www.gabriele.org/Home     U.S. Department of Health and Human Services (HHS): the mission of HHS is to enhance the health and well-being of all Americans, by providing for effective health and human services and by fostering sound, sustained advances in the sciences underlying medicine, public health, and .   https://www.hhs.gov     Substance Abuse and Mental Health Services Administration (SAMHSA): SAMHSA is the agency within [facility] that leads public health efforts to advance the behavioral health of the nation. SAMHSA's mission is to reduce the impact of substance abuse and mental illness on [place] communities.   https://www.samhsa.gov     National Institutes of Health (NIH): a part of [facility], [facility] is  the largest biomedical research agency in the world.   https://www.nih.gov     National Dundas on Drug Abuse (MAGGIE): sponsored by the NIH, the mission of MAGGIE is to advance science on drug use and addiction and to apply that knowledge to improve individual and public health.  https://maggie.nih.gov     National [place] on Alcohol Abuse and Alcoholism (NIAAA): sponsored by the NIH, the mission of NIAA is to generate and disseminate fundamental knowledge about the effects of alcohol on health and well-being, and apply that knowledge to improve diagnosis, prevention, and treatment of alcohol-related problems, including alcohol use disorder, across the lifespan.   https://www.niaaa.nih.gov     National Harm Reduction Coalition: resources for harm reduction, including techniques, strategies, policy, and advocacy.  https://harmreduction.org     The SHARE Approach - A Model for Shared Decision Making:  [x] Fact Sheet  https://www.[facility].gov/sites/default/files/publications/files/share-approach_factsheet.pdf     AMA Principles of Medical Ethics - Informed Consent & Shared Decision Making:  [x] Chapter  https://www.ama-assn.org/system/files/2019-06/code-of-medical-onbrop-bkjrjly-9.pdf     Safety Netting for Primary Care:  [x] Article  https://www.ncbi.nlm.nih.gov/pmc/articles/HZM7635893/pdf/jqwsdyj-1083--e70.pdf       MEDICATION MANAGEMENT:     [x] In addition to the potential beneficial effects, the use of any medication or drug (prescribed, over the counter or otherwise) carries with it the risk of potential adverse effects.  Each has a set of typical adverse effects - some common, some rare - but idiosyncratic and unanticipated reactions unique to you are always possible.      [x] It is important to remember that untreated illness can also pose a risk, which must be taken into account when weighing the pros and cons of a medication trial.    [x] Medications and drugs can sometimes interact with each other in the  body, leading to adverse effects - it is important that all your providers know all the medications and drugs you take - prescribed, over the counter, or otherwise.  Keep all your practitioners up to date with any changes.  It's always a good idea to keep an up-to-date list in an easily accessible location.    [x] There is an inherent unpredictability to all treatment, including the use of medication.  Unexpected outcomes can occur - keep me up to date with any difficulties you encounter.    [x] It is important to take medication as directed, and to comply fully with the instructions.  Check with the appropriate provider first before adjusting or stopping your medication on your own.    If you require further information pertaining to the issues outlined above, please reach out to your providers through the [facility] portal at [email or web address], or call [phone] to discuss.  See resource list for additional material.     Additional information can be provided pertaining to your diagnosis, intended outcomes, target symptoms for treatment, and possible benefits and risks of medication - you can also access this information through the provided resources.  Possible alternatives to the current treatment plan (including no treatment) can also be reviewed.      GENERAL HEALTH & WELLNESS:     [x] Establish and follow regularly with a primary care physician for routine health maintenance and management of any medical comorbidities.  [x] Follow a healthy diet, exercise routinely, and monitor weight and metabolic parameters.  [x] Allow adequate time for sleep and practice good sleep hygiene.  [x] Do not operate a motor vehicle or heavy machinery if the effects of medications or the symptoms underlying your condition impair the ability for you to do so safely.    Dietary Guidelines for Americans, 8872-4016:  U.S. Department of Agriculture  (USDA)  https://www.dietaryguidelines.gov/sites/default/files/2020-12/Dietary_Guidelines_for_Americans_2020-2025.pdf#page=31     The Nutrition Source:  [facility]  https://www.[facility].[place].[facility]/nutritionsource       SLEEP HYGIENE:     Follow these tips to establish healthy sleep habits:  [x] Keep a consistent sleep schedule. Get up at the same time every day, even on weekends or during vacations.  [x] Set a bedtime that is early enough for you to get at least 7-8 hours of sleep.  [x] Don't go to bed unless you are sleepy.  [x] If you don't fall asleep after 20 minutes, get out of bed. Go do a quiet activity without a lot of light exposure. It is especially important to not get on electronics.  [x] Establish a relaxing bedtime routine.  [x] Use your bed only for sleep and sex.  [x] Make your bedroom quiet and relaxing. Keep the room at a comfortable, cool temperature.  [x] Limit exposure to bright light in the evenings.  [x] Turn off electronic devices at least 30 minutes before bedtime.  [x] Don't eat a large meal before bedtime. If you are hungry at night, eat a light, healthy snack.  [x] Exercise regularly and maintain a healthy diet.  [x] Avoid consuming caffeine in the afternoon or evening.  [x] Avoid consuming alcohol before bedtime.  [x] Reduce your fluid intake before bedtime.    QUICK TIPS FOR BETTER SLEEP  Reduce smartphone usage Create and maintain a nightly ritual Avoid caffeine 4-6 hours before sleeping Don't eat or drink too much at bedtime Sleep at the same time every night        American Academy of Sleep Medicine - Healthy Sleep Habits:  https://sleepeducation.org/healthy-sleep/healthy-sleep-habits     American Academy of Sleep Medicine - Bedtime Calculator:  https://sleepeducation.org/healthy-sleep/bedtime-calculator     American Academy of Sleep Medicine - Cognitive Behavioral Therapy for Insomnia (CBT-I):  https://sleepeducation.org/patients/cognitive-behavioral-therapy      American Academy of Sleep Medicine - Insomnia:  https://sleepeducation.org/sleep-disorders/insomnia       ALCOHOL & DRUG USE COUNSELING:     Preventing Excessive Alcohol Use (CDC):  https://www.cdc.gov/alcohol/fact-sheets/moderate-drinking.htm#:~:text=To%20reduce%20the%20risk%20of,days%20when%20alcohol%20is%20consumed.     [x] Alcohol consumption is associated with a variety of short- and long-term health risks, including motor vehicle crashes, violence, sexual risk behaviors, high blood pressure, and various cancers (e.g., breast cancer).  [x] The risk of these harms increases with the amount of alcohol you drink. For some conditions, like some cancers, the risk increases even at very low levels of alcohol consumption (less than 1 drink).  [x] To reduce the risk of alcohol-related harms, the 0072-9901 Dietary Guidelines for Americans recommends that adults of legal drinking age can choose not to drink, or to drink in moderation by limiting intake to 2 drinks or less in a day for men or 1 drink or less in a day for women, on days when alcohol is consumed.  [x] The Guidelines also do not recommend that individuals who do not drink alcohol start drinking for any reason and that if adults of legal drinking age choose to drink alcoholic beverages, drinking less is better for health than drinking more.  [x] The Guidelines note that some people should not drink alcohol at all, such as:  - If they are pregnant or might be pregnant.  - If they are younger than age 21.  - If they have certain medical conditions or are taking certain medications that can interact with alcohol.  - If they are recovering from an alcohol use disorder or if they are unable to control the amount they drink.  [x] The Guidelines also note that not drinking alcohol is the safest option for women who are lactating. Generally, moderate consumption of alcoholic beverages by a woman who is lactating (up to 1 standard drink in a day) is not known to  "be harmful to the infant, especially if the woman waits at least 2 hours after a single drink before nursing or expressing breast milk. Women considering consuming alcohol during lactation should talk to their healthcare provider.  [x] The Guidelines note, Emerging evidence suggests that even drinking within the recommended limits may increase the overall risk of death from various causes, such as from several types of cancer and some forms of cardiovascular disease. Alcohol has been found to increase risk for cancer, and for some types of cancer, the risk increases even at low levels of alcohol consumption (less than 1 drink in a day).  [x] Although past studies have indicated that moderate alcohol consumption has protective health benefits (e.g., reducing risk of heart disease), recent studies show this may not be true.  [x] Its important to focus on the amount people drink on the days that they drink. Even if women consume an average of 1 drink per day or men consume an average of 2 drinks per day, binge drinking increases the risk of experiencing alcohol-related harm in the short-term and in the future.    Drinking Levels Defined (NIAAA):  https://www.niaaa.nih.gov/alcohol-health/overview-alcohol-consumption/moderate-binge-drinking     Drinking in Moderation:  According to the "Dietary Guidelines for Americans 9682-6493, U.S. Department of Health and Human Services and U.S. Department of Agriculture, adults of legal drinking age can choose not to drink or to drink in moderation by limiting intake to 2 drinks or less in a day for men and 1 drink or less in a day for women, when alcohol is consumed. Drinking less is better for health than drinking more.    Binge Drinking:  NIAAA defines binge drinking as a pattern of drinking alcohol that brings blood alcohol concentration (BAN) to 0.08 percent - or 0.08 grams of alcohol per deciliter - or higher.  For a typical adult, this pattern corresponds to consuming 5 " or more drinks (male), or 4 or more drinks (female), in about 2 hours.    The Substance Abuse and Mental Health Services Administration (SAMHSA), which conducts the annual National Survey on Drug Use and Health (NSDUH), defines binge drinking as 5 or more alcoholic drinks for males or 4 or more alcoholic drinks for females on the same occasion (i.e., at the same time or within a couple of hours of each other) on at least 1 day in the past month.    Heavy Alcohol Use:  NIAAA defines heavy drinking as follows:  - For men, consuming more than 4 drinks on any day or more than 14 drinks per week.  - For women, consuming more than 3 drinks on any day or more than 7 drinks per week.     [facility] defines heavy alcohol use as binge drinking on 5 or more days in the past month.    Patterns of Drinking Associated with Alcohol Use Disorder:  Binge drinking and heavy alcohol use can increase an individual's risk of alcohol use disorder.    Certain people should avoid alcohol completely, including those who:  - Plan to drive or operate machinery, or participate in activities that require skill, coordination, and alertness.  - Take certain over-the-counter or prescription medications.  - Have certain medical conditions.  - Are recovering from alcohol use disorder or are unable to control the amount that they drink.  - Are younger than age 21.  - Are pregnant or may become pregnant.    U.S. Standard Drink  12 oz beer   (5% ABV) 8 oz malt liquor   (7% ABV) 5 oz wine   (12% ABV) 1.5 oz 80-proof distilled spirit  (40% ABV)        Heroin use harm reduction:  1. Carry naloxone. When using heroin, make sure you have at least one dose of naloxone - the overdose reversal drug - and have it in plain view. Understand how to give it.  2. Try a small dose first. It is best to first try a small amount of the heroin to check the effect.  3. Dont use heroin alone. Always use heroin with someone else and take turns while using.    It is possible to  overdose with heroin whether you are snorting, injecting or using it in another form.    Signs of an overdose or emergency:   - The person is awake but unable to talk.  - Their body is limp.  - Their breathing is shallow or slow or stopped.  - Their skin is pale, ashen or clammy/sweaty.  - They are unconscious.    In case of emergency, give naloxone. If you suspect the heroin may contain fentanyl, administer more than one dose. Seek medical help even if naloxone has been given. Call 911 for help.      ADHD TREATMENT AND STIMULANT MEDICATIONS:     [facility] Prescription Stimulants Drug Facts  CMS Stimulant and Related Medications: Use in Adults  BASIM Drug Fact Sheets: Stimulants  FDA Drug Safety Communication: Stimulants  [facility] ADHD  WebMD ADHD Medications and Side Effects  [facility]: ADHD Medication      SHARED DECISION MAKING & INFORMED CONSENT:     Shared medical decision making and informed consent are the hallmark and bedrock of excellent clinical care.  During the encounter, shared medical decision making was employed and informed consent was obtained, to the degree possible, whenever feasible, appropriate and relevant. Those interventions are supplemented here with written materials, detailing the topics in more depth.       PSYCHOEDUCATION:     Psychoeducation pertaining to the following -     Diagnosis Etiology Disease Processes Natural Progression   Treatment Options Time Course Safety Netting Informed Consent   Intended Benefits of Medication Expectable Adverse Effects Target Symptoms for Treatment Alternatives to Current Treatment   Shared   Decision Making Risk Mitigation Strategies Harm Reduction Techniques Associated Bio-Med Complications     - can be further discussed and reviewed (you can also access additional information through the provided resources in this document).      Effective communication is essential in order to engage in shared medical decision making.  If you had difficulty understanding  anything during your encounter or in this supplementary document, please contact your providers through the [facility] portal at [email or web address] or call [phone].     Sobia Dictionary  https://dictionary.sobia.org/us       It can be easy to miss, forget, or misremember important important information that was discussed during the session - especially when you're stressed, upset, or don't feel well.  If you or a representative have any additional questions, concerns, or topics to discuss - please contact your providers through the [facility] portal at [email or web address] or call [phone].    Memory Loss  https://www.Workube.FIA Formula E/brain/memory-loss    Causes of Memory Loss  https://www.Remark/what-causes-memory-loss-7939494    Memory loss: When to seek help  https://www.[facility].org/diseases-conditions/alzheimers-disease/in-depth/memory-loss/art-46585529    Memory, Forgetfulness, and Aging: What's Normal and What's Not?  https://www.shawn.nih.gov/health/memory-forgetfulness-and-aging-whats-normal-and-whats-not    Depression and Memory Loss  https://www.gDine/health/depression/depression-and-memory-loss    The Relationship Between Anxiety and Memory Loss  https://www.[facility]/academics/blog-posts/the-relationship-between-anxiety-and-memory-loss     PRESCRIPTION DRUG MANAGEMENT:     Prescription Drug Management entails the following:  [x] The review, recommendation, or consideration without recommendation of medications during the encounter.  [x] Discussion (to the extent possible) with the patient and/or other interested parties of the diagnosis, target symptoms, intended outcomes, and possible benefits and risks of medication, as well as alternatives (including no treatment), if not otherwise known or stated prior.  [x] Discussion (to the extent possible) with the patient and/or other interested parties of possible expectable adverse effects of any proposed individual  psychotropic agents, as well as the inherent unpredictability of treatment, if not otherwise known or stated prior.  [x] Informed consent is sought from the patient (and/or guardian/designated decision maker, if applicable) after a thorough discussion (to the extent possible) of the aforementioned points outlined above.  [x] The provision of counseling (to the extent possible) to the patient and/or other interested parties on the importance of full compliance with any prescribed medication, if not otherwise known or stated prior.    Information on psychotropic medication can be found at:   National [place] of Mental Health: Information on Mental Health Medications      RISK MITIGATION, HARM REDUCTION & SAFETY NETTING:     Risk Mitigation Strategies, Harm Reduction Techniques, and Safety Netting are important interventions that can reduce acute and chronic risk.  As such, opportunities were sought to incorporate psychoeducation and practical advice pertaining to these topics into the encounter, to the degree possible, whenever feasible, appropriate and relevant.  Those interventions are supplemented here with written materials, detailing the topics in more depth.       RISK MITIGATION STRATEGIES:     Risk mitigation strategies are used to reduce the likelihood of future episodes of suicide, homicide, violence, and/or other problematic behaviors (e.g. self-injurious, risky, addictive, compulsive, impulsive). The following are examples of risk mitigation strategies which you can employ in order to reduce your overall burden of risk.     [x] Treatment of underlying psychopathology driving acute and chronic risk to the extent possible.  [x] Use of self administered rating scales and journaling to assist in risk tracking.  [x] Exploration of protective factors to potentially counterbalance risk.  [x] Identification and avoidance of triggers and situations that increase risk, including excessive alcohol and drug  use.  [x] Timely follow up and ongoing treatment of mental health issues moving forward.  [x] Full compliance with medication regimen.  [x] A good working knowledge of your medication regimen, including specific instructions on the administration of the medications.  [x] Consultation with an appropriate medical provider prior to altering or deviating from these instructions on your own.  [x] Active involvement and participation of family and natural support wherever feasible and possible.  [x] Development and review of coping strategies that can be immediately deployed in times of acute crisis.  [x] Implementation of home safety practices and the removal/reduction of access to lethal means (including, but not limited to, firearms, certain types and quantities of medication, poisons, or other methods you may have contemplated or identified).  [x] Collaborative development of a written safety plan with your treatment team and loved ones that can be immediately referred to in times of acute crisis.  [x] Utilization of a safety contract to engage your treatment team and further assess/manage risk.  [x] A good working knowledge of how to access emergency treatment in times of acute crisis.  [x] Utilization of suicide hotlines number (988) and resources in times of crisis.    If you require further information pertaining to the issues outlined above, please reach out to your providers through the [facility] portal at [email or web address], or call [phone] to discuss.  See resource list for additional material.      SAFETY NETTING:     In healthcare, safety netting refers to the provision of information to help patients or carers identify the need to consult a health care professional if a health concern arises or changes.  The relevance of this advice is most obvious with chronic mental illnesses, as their dynamic nature, with symptoms and signs emerging at different times and in different combinations, makes safety  netting particularly important.  Specific safety net advice for you includes the following:    [x] The existence of uncertainty. Mental health diagnoses and conditions contain at least some degree of uncertainty - knowing this, you should feel empowered to reconsult if necessary.  [x] What exactly to look out for. Given the recognised risk of possible deterioration or the development of complications, you should become familiar with the specific clinical features (including red flags) to look out for.    [x] How exactly to seek further help. You should know how and where to seek further help if needed.  Make a plan in advance and keep it handy.  It's also a good idea to share the plan with your treatment providers and loved ones.  [x] What to expect about time course. Mental health diagnoses and conditions often have an expected time course, which is important information for you to know.  However, if your difficulties do not conform to this time line and concerns arise, do not delay seeking further medical advice.    If you require further information pertaining to the issues outlined above, please reach out to your providers through the [facility] portal at [email or web address], or call [phone] to discuss.  See resource list for additional material.      HARM REDUCTION:     Harm Reduction techniques are used in an effort to reduce negative consequences associated with risky and maladaptive behaviors, until cessation of the problematic behaviors can be established.  Harm reduction is best thought of as a journey and not a destination; it is not an endorsement of problematic behavior, but an acknowledgement and recognition of the step-by-step nature of recovery.      Although commonly employed in working with people who suffer with drug addiction, harm reduction can be more broadly applied to any problematic behavior.    Harm Reduction and Substance Abuse:  [x] Incorporates a spectrum of strategies that  includes safer use, managed use, abstinence, meeting people who use drugs where theyre at, and addressing conditions of use along with the use itself.  [x] Accepts, for better or worse, that licit and illicit drug use is part of our world and chooses to work to minimize its harmful effects rather than simply ignore or condemn them.  [x] Understands drug use as a complex, multi-faceted phenomenon that encompasses a continuum of behaviors from severe use to total abstinence, and acknowledges that some ways of using drugs are clearly safer than others.  [x] Calls for the non-judgmental, non-coercive provision of services and resources to people who use drugs and the communities in which they live in order to assist them in reducing attendant harm.  [x] Affirms people who use drugs themselves as the primary agents of reducing the harms of their drug use and seeks to empower them to share information and support each other in strategies which meet their actual conditions of use.  [x] Does not attempt to minimize or ignore the real and tragic harm and danger that can be associated with illicit drug use.  [x] Meets people where they are, but seeks to not leave them there.  [x] Examples of specific interventions include, but are not limited to, narcan (naloxone), medication assisted treatment, syringe access, overdose prevention, and safer drug use techniques.    Key Harm Reduction Strategies: Opioid Use Disorder  [x] Safe Injection Sites & Equipment  [x] Managed Use  [x] Syringe Exchange Programs  [x] Fentanyl Test Strips  [x] Pharmacotherapy/Medication Assisted Treatment  [x] Narcan  [x] Good Zoroastrianism Laws  [x] Treatment Instead of [facility]  [x] Diversion Programs  [x] Overdose Education  [x] Abstinence    Whether or not you struggle with substance abuse, any and all opportunities to employ harm reduction techniques to address difficult to change problematic behaviors should be sought and implemented - whenever and  "wherever feasible, relevant and applicable. Additionally, harm reduction techniques can be applied broadly, and are relevant for a multitude of situations - even those that do not involve problematic or maladaptive behaviors.     EXAMPLES OF HARM REDUCTION IN OTHER AREAS  SUN SCREEN SEAT BELTS SPEED LIMITS BIRTH CONTROL        If you require further information pertaining to the issues outlined above, please reach out to your providers through the [facility] portal at [email or web address], or call [phone] to discuss.  See resource list for additional material.      FIREARM SAFETY:     THE SIX BASIC GUN SAFETY RULES  There are six basic gun safety rules for gun owners to understand and practice at all times:  Treat all guns as if they are loaded. Always assume that a gun is loaded even if you think it is unloaded. Every time a gun is handled for any reason, check to see that it is unloaded. If you are unable to check a gun to see if it is unloaded, leave it alone and seek help from someone more knowledgeable about guns.  Keep the gun pointed in the safest possible direction. Always be aware of where a gun is pointing. A "safe direction" is one where an accidental discharge of the gun will not cause injury or damage. Only point a gun at an object you intend to shoot. Never point a gun toward yourself or another person.  Keep your finger off the trigger until you are ready to shoot. Always keep your finger off the trigger and outside the trigger guard until you are ready to shoot. Even though it may be comfortable to rest your finger on the trigger, it also is unsafe. If you are moving around with your finger on the trigger and stumble or fall, you could inadvertently pull the trigger. Sudden loud noises or movements can result in an accidental discharge because there is a natural tendency to tighten the muscles when startled. The trigger is for firing and the handle is for handling.  Know your target, its " surroundings and beyond. Check that the areas in front of and behind your target are safe before shooting. Be aware that if the bullet misses or completely passes through the target, it could strike a person or object. Identify the target and make sure it is what you intend to shoot. If you are in doubt, DON'T SHOOT! Never fire at a target that is only a movement, color, sound or unidentifiable shape. Be aware of all the people around you before you shoot.  Know how to properly operate your gun. It is important to become thoroughly familiar with your gun. You should know its mechanical characteristics including how to properly load, unload and clear a malfunction from your gun. Obviously, not all guns are mechanically the same. Never assume that what applies to one make or model is exactly applicable to another. You should direct questions regarding the operation of your gun to your firearms dealer, or contact the  directly.  Store your gun safely and securely to prevent unauthorized use. Guns and ammunition should be stored separately. When the gun is not in your hands, you must still think of safety. Use an approved firearms safety device on the gun, such as a trigger lock or cable lock, so it cannot be fired. Store it unloaded in a locked container, such as an approved lock box or a gun safe. Store your gun in a different location than the ammunition. For maximum safety you should use both a locking device and a storage container.    ADDITIONAL SAFETY POINTS  The six basic safety rules are the foundational rules for gun safety. However, there are additional safety points that must not be overlooked.  [x] Never handle a gun when you are in an emotional state such as anger or depression. Your judgment may be impaired. If you have acute or chronic suicidal ideation, a suicide plan, or suicidal intent, have firearms removed and your access restricted by a trusted loved one or other responsible individual  "or agency.  [x] Never shoot a gun in celebration (the Fourth of July or New Year's Elsie, for example). Not only is this unsafe, but it is generally illegal. A bullet fired into the air will return to the ground with enough speed to cause injury or death.  [x] Do not shoot at water, flat or hard surfaces. The bullet can ricochet and hit someone or something other than the target.  [x] Hand your gun to someone only after you verify that it is unloaded and the cylinder or action is open. Take a gun from someone only after you verify that it is unloaded and the cylinder or action is open.  [x] Guns, alcohol and drugs don't mix. Alcohol and drugs can negatively affect judgment as well as physical coordination. Alcohol and any other substance likely to impair normal mental or physical functions should not be used before or while handling guns. Avoid handling and using your gun when you are taking medications that cause drowsiness or include a warning to not operate machinery while taking this drug.   [x] The loud noise from a fired gun can cause hearing damage, and the debris and hot gas that is often emitted can result in eye injury. Always wear ear and eye protection when shooting a gun.      GUNS AND CHILDREN - FIREARM OWNER RESPONSIBILITIES    You Cannot Be Too Careful with Children and Guns  [x] There is no such thing as being too careful with children and guns. Never assume that simply because a toddler may lack finger strength, they can't pull the trigger. A child's thumb has twice the strength of the other fingers. When a toddler's thumb "pushes" against a trigger, invariably the barrel of the gun is pointing directly at the child's face. NEVER leave a firearm lying around the house.  [x] Child safety precautions still apply even if you have no children or if your children have grown to adulthood and left home. A nephew, niece, neighbor's child or a grandchild may come to visit. Practice gun safety at all " "times.  [x] To prevent injury or death caused by improper storage of guns in a home where children are likely to be present, you should store all guns unloaded, lock them with a firearms safety device and store them in a locked container. Ammunition should be stored in a location separate from the gun.    Talking to Children About Guns  [x] Children are naturally curious about things they don't know about or think are "forbidden." When a child asks questions or begins to act out "gun play," you may want to address his or her curiosity by answering the questions as honestly and openly as possible. This will remove the mystery and reduce the natural curiosity. Also, it is important to remember to talk to children in a manner they can relate to and understand. This is very important, especially when teaching children about the difference between "real" and "make-believe." Let children know that, even though they may look the same, real guns are very different than toy guns. A real gun will hurt or kill someone who is shot.    Instill a Mind Set of Safety and Responsibility  [x] The American Academy of Pediatrics reports that adolescence is a highly vulnerable stage in life for teenagers struggling to develop traits of identity, independence and autonomy. Children, of course, are both naturally curious and innocently unaware of many dangers around them. Thus, adolescents as well as children may not be sufficiently safeguarded by cautionary words, however frequent. Contrary actions can completely undermine good advice. A "Do as I say and not as I do" approach to gun safety is both irresponsible and dangerous.  [x] Remember that actions speak louder than words. Children learn most by observing the adults around them. By practicing safe conduct you will also be teaching safe conduct.    Safety and Storage Devices  [x] If you decide to keep a firearm in your home you must consider the issue of how to store the firearm in a " safe and secure manner. There are a variety of safety and storage devices currently available to the public in a wide range of prices. Some devices are locking mechanisms designed to keep the firearm from being loaded or fired, but don't prevent the firearm from being handled or stolen. There are also locking storage containers that hold the firearm out of sight. For maximum safety you should use both a firearm safety device and a locking storage container to store your unloaded firearm.   Two of the most common locking mechanisms are trigger locks and cable locks. Trigger locks are typically two-piece devices that fit around the trigger and trigger guard to prevent access to the trigger. One side has a post that fits into a hole in the other side. They are locked by a key or combination locking mechanism. Cable locks typically work by looping a strong steel cable through the action of the firearm to block the firearm's operation and prevent accidental firing. However, neither trigger locks nor cable locks are designed to prevent access to the firearm.   [x] Smaller lock boxes and larger gun safes are two of the most common types of locking storage containers. One advantage of lock boxes and gun safes is that they are designed to completely prevent unintended handling and removal of a firearm. Lock boxes are generally constructed of sturdy, high-grade metal opened by either a key or combination lock. Gun safes are quite heavy, usually weighing at least 50 pounds. While gun safes are typically the most expensive firearm storage devices, they are generally more reliable and secure.     Remember: Safety and storage devices are only as secure as the precautions you take to protect the key or combination to the lock.    RULES FOR KIDS  Adults should be aware that a child could discover a gun when a parent or another adult is not present. This could happen in the child's own home; the home of a neighbor, friend or  relative; or in a public place such as a school or park. If this should happen, a child should know the following rules and be taught to practice them.   Stop  The first rule for a child to follow if he/she finds or sees a gun is to stop what he/she is doing.  Don't Touch!  The second rule is for a child not to touch a gun he/she finds or sees. A child may think the best thing to do if he/she finds a gun is to pick it up and take it to an adult. A child needs to know he/she should NEVER touch a gun he/she may find or see.  Leave the Area  The third rule is to immediately leave the area. This would include never taking a gun away from another child or trying to stop someone from using gun.  Tell an Adult  The last rule is for a child to tell an adult about the gun he/she has seen. This includes times when other kids are playing with or shooting a gun.     METHODS OF CHILDPROOFING YOUR FIREARM  As a responsible handgun owner, you must recognize the need and be aware of the methods of childproofing your handgun, whether or not you have children.  Whenever children could be around, whether your own, or a friend's, relative's or neighbor's, additional safety steps should be taken when storing firearms and ammunition in your home.  [x] Always store your firearm unloaded.  [x] Use a firearms safety device AND store the firearm in a locked container.  [x] Store the ammunition separately in a locked container.  Always storing your firearm securely is the best method of childproofing your firearm; however, your choice of a storage place can add another element of safety. Carefully choose the storage place in your home especially if children may be around.  [x] Do not store your firearm where it is visible.  [x] Do not store your firearm in a bedside table, under your mattress or pillow, or on a closet shelf.  [x] Do not store your firearm among your valuables (such as jewelry or cameras) unless it is locked in a secure  container.  [x] Consider storing firearms not possessed for self-defense in a safe and secure manner away from the home.    [facility] for Gun Safety:  https://www.everytown.org       Gun Violence: Prediction, Prevention and Policy  American Psychological Association Panel of Experts Report  https://www.apa.org/pubs/reports/gun-violence-report.pdf     If you require further information pertaining to any of the issues outlined above, please reach out to your providers through the [facility] portal at [email or web address], or call [phone] to discuss.  See resource list for additional material.      IN CASE OF SUICIDAL THINKING, call the [employer] Suicide Hotline Number: 988    988 Suicide & Crisis Lifeline: 988 , 0-195-916-TALK (8255)  Provides 24/7, free and confidential support for people in distress, prevention and crisis resources for you or your loved ones, and best practices for professionals.    Call, text or chat.  https://[employer]              REFERRAL RECOMMENDATIONS FOR SUBSTANCE ABUSE & MENTAL HEALTH      IN CASE OF SUICIDAL THINKING, call the [employer] Suicide IMANINline Number: 988    988 Suicide & Crisis Lifeline: 988 , 9-711-542-TALK (8255)  https://[employer]       SUBSTANCE ABUSE:     [facility] (formerly known as the ABU)  [x] [phone], Option 2  [x] [address], [place] [address]  [x] [email or web address]  [x] [facility] delivers comprehensive and collaborative treatment for alcohol and substance use disorders.  Excellent program for working professionals or anyone else seeking recovery.  [x] Requires insurance approval prior to starting program, call number above for more information.  [x] Intensive Outpatient Rehabilitation Program - M-F 9am-3pm - daily groups with psychologists and social workers, sessions with MDs 3x per week   [x] Ambulatory detox and dual diagnosis  available      SUBOXONE:     NOTE: some Suboxone clinics require their clients to participate in a structured program (such as an IOP) in order to be prescribed Suboxone.  Some clinics have a long waiting list.  Most of these clinics do not accept walk-in clients, so call first to to learn what must be done to get started on Suboxone.    [facility] - [phone] (can do Sublocade)  [address], [place] [address]    [facility]  [address], [place]  [phone]    [facility] - [phone] (can do Sublocade)  [address] [place] [address]    [facility]  [address] [place]  [phone]     [facility] (very short waiting list, may accept some walk-in's but call first if possible)  [address], [place] [id number] [phone]; [phone]    [facility]   [address] [place]    [phone]    [facility] (can usually be seen within a week but is cash only for appointment)  [address] [place], [place]    [facility]  [address] [place]  [phone]    [facility] ([facility])  [address], [place] [id number] [phone]    [place], [place]:    [facility] - [address] [place] [address] - Tel: [phone]    Ravin Harper - [address] [place] [address] - Tel: [phone]    Price Aguilar - [address] [place] [address] - Tel: [phone]    Umer Zuniga - [address] [place] [address] - Tel: [phone]    Victoriano Smith - [address] [place] [address] - Tel: [phone]    [place], [place]:     Dr. Concepcion Gordon and Dr. Chris Mancia - [address] Tel: [phone]    Dr. Shauna Brantley - [address] [place] [address] Reji Liu [place] - Tel: [phone]    Dr. Abhishek Hackett - Tel: [phone]    Dr. Kee Love - [facility] - [phone]      METHADONE:     [facility] (the only methadone clinic in the  city, has two locations)  [x] [place] [facility] [place] [address], [phone]  [x] [facility] [place] [address], [phone]      12 STEP PROGRAMS (and similar):     Alcoholics Anonymous (local)  [x] [phone]  [x] www.[place].org for schedules for in-person and online meetings  [x] There are AA meetings throughout the day all over town  [x] AA costs nothing to attend; they pass a basket for donations but this is not required    Narcotics Anonymous  [x] [phone]  [x] www.noana.org  [x] There are NA meetings throughout the day all over [facility]  [x] NA costs nothing to attend; they pass a basket for donations but this is not required    Alcoholics Anonymous Online Intergroup (national)  [x] www.aa-intergroup.org  [x] Good resource for large, nation-wide meetings  [x] Can also attend smaller, local meetings in other cities  [x] Countless meetings all day and all night  [x] AA costs nothing to attend; they pass a basket for donations but this is not required    [employer] - 24/7 zoom meetings for women and coed - sign on anytime, anywhere!  [email or web address]    Online Intergroup of AA - [address] - 24/7 zoom meetings  https://aa-intergroup.org/meetings/    LOOKING FOR AN ALTERNATIVE TO 12 STEP PROGRAMS - check out:  [facility]: https://www.smartrecovery.org/about-us  [facility]: [email or web address]      DETOX UNITS (USUALLY 5-7 DAYS):     [facility]: [address], [place]  [phone], call first to ensure bed availability    [facility]: [address] [place]  [phone], Option 1, call first to ensure bed availability    [facility]: [address], [place]  [phone] (intake by appointment only)    [facility]: [address], [place]  [phone]      INTENSIVE OUTPATIENT PROGRAMS:     [facility] (formerly known as the ABU)  [x]  [phone], Option 2  [x] [address], [facility] [address]  [x] [email or web address]  [x] [facility] delivers comprehensive and collaborative treatment for alcohol and substance use disorders.  Excellent program for working professionals or anyone else seeking recovery.  [x] Requires insurance approval prior to starting program, call number above for more information.  [x] Intensive Outpatient Rehabilitation Program - M-F 9am-3pm - daily groups with psychologists and social workers, sessions with MDs 3x per week   [x] Ambulatory detox and dual diagnosis available    [facility] Intensive Outpatient Program  [x] [phone]  [x] [facility] [facility] (the clinic not on [facility])  [x] Call number above for more info and to check insurance requirements    [facility]  [address] [place] [address] [phone]    [facility]:  [address] [place], [place] [address]?, [phone]  [address] [facility] [place], [place] [address]?, [phone]    RESIDENTIAL REHABS (USUALLY 28 DAYS):     [facility]: [address], [phone]    [facility]: [facility] [place] , [facility]  [phone] (intake by appointment only)    [facility] (men only) [address] [facility], [phone]    [facility] (Female only) [address] [facility], [phone]    [facility]: [address], [facility], men's program [phone], women's program [phone]    [facility]: [address], [facility], [phone]    [facility]: [address], [place], [phone]    [facility]: Men only, [phone], [address] [place]    [facility]: [address], [place], [place], [phone]    [facility]: [address] [place] [address],  [phone]  New Location: [address] [place] [address] [address],  [place] [address], [phone]    [facility]:   ?[address]?[place], [place] [address]?[phone]    Bev: [address], [place] [address], [phone]    [place]: [place], [phone]     [facility]: [place], [place], [phone]    [facility]: [place], [place], [phone]    [facility]: [place], [place], [phone]    [place]: [place], [phone]    [facility]: [address] [place] [address], [phone]    COMMUNITY ADDICTION CLINICS:     ACER: [address] [facility], [facility] [place], [phone] -or- [address] [place] [address]    [facility] [place]: [address] [facility] [place]  [address]     [facility]: Clinics [phone]; Crisis [phone]    [facility]: [address] [place] [address]    [facility]: [address] [place] [address]    [facility]: [address], [place] [address]: 2nd Floor [address] [place] [address]    [facility]: [address], [place] [address]    [facility], [place] [address]    [facility]: [address] [place] [address] [phone]  (serves youth 16-23 years old)    [facility]: [facility]/[place]/[facility] [phone]    [facility]: [address] [facility], [place] [phone]    [facility]: [address], [facility] [phone]    [facility]: [address] [facility], [address], [phone]     [facility]: [address] [facility] Elmer Gorman, [phone], [phone]    RESOURCES IN OTHER [facility]:     [facility]: [facility] F. [address]by Edwards, [place]  "[place], [phone], [phone]    [facility]: [address] [facility], [address], [phone]    [facility], [address] [place] [address], [phone]    [facility]: [address] [facility], [phone]    [facility], [address] [facility], [phone] ([facility])    [facility], [address] [facility], [phone] ([facility])    Located within [facility], [address] [facility], [address], [place], [phone] ([facility], and [facility])    [facility], [address] [facility], [phone] ([facility])    [facility] Hotline [phone], [phone]    [facility], [address] [facility], [facility], [address] [facility], [address], [facility], [address] [facility], 500 [facility]. [address] [facility], [address], [facility] Human [facility], [address], [address] [phone]    [facility], [address] [facility] [phone]    [facility], [address] [facility], [phone]    [facility] for Addiction Recovery, [address] [facility], [phone]      Croatian SPEAKING (en español):     Información de la reunión de Alcohólicos Anónimos  [facility], 10:00 am  Habla español  Esta reunión está abierta y cualquiera puede asistir.    [place] Islander speaking Alcoholics anonymous meetings:  El "Uvaldo Woden AA Skype" es un uvaldo on line de Alcohólicos Anónimos en español. El uvaldo es sharlene, gratuito y virtual a través de Skype Audio. El uvaldo funciona mediante surya llamada grupal de " voz, por lo que no se utiliza la videollamada, ni se pueden abdirashid las imágenes o rostros de los participantes. Hace taryn años y medio abrimos el primer Uvaldo de AA por Skchristy en [place], bhupendra actualmente asisten personas desde [place], [place], [place], [place], [place], [place], [place], [place], [place], [place], [place] y [place], entre otros.    El uvaldo es muy útil para los alcohólicos que residen en lugares donde no se celebran reuniones de AA, o residen en lugares donde las reuniones de AA son un número limitado de días a la semana, o para aquellos compañeros que se hayan de viaje o que, por cualquier motivo, se hayan convalecientes y no pueden desplazarse. Todos los días nos reuniones a las 21:00 (hora española)    Podéis obtener más información sobre el uvaldo y delta sesiones en la página web [email or web address]      MENTAL HEALTH:     [facility] Outpatient Clinic  [phone]    [facility] - General Psychiatry Intensive Outpatient Program  [facility] (formerly known as the BMU)  [phone], option 3    [facility] - Dual Diagnosis Intensive Outpatient Program  [facility] (formerly known as the ABU)  [phone], option 2      COMMUNITY MENTAL HEALTH CENTERS:     [facility]  ([facility])  Serves [facility], and [facility] residents.  Serves uninsured patients & those with Medicaid.  Main location: [address] [place] [address]  [phone]  Walk-in's available during regular business hours.  24/7 Crisis Line: [phone]    [facility] Services [facility]  (aka [facility], [facility])  Serves [facility].  Serves uninsured patients, those with Medicaid and some private plans.  Walk-in's available during regular business hours.  Primary care services available  as well.  [facility]: [address] [facility] [address] [place] [address];  [phone]  [place]: [address] [place] [address];  [phone]  24/7 Crisis Line: [phone]    [facility]  Serves uninsured patients & those with Medicaid, call for more info.  Primary care, pediatrics, HIV treatment, and dentistry services available as well.  Three locations.  [phone]    [facility] [place]?Corporate Office  Serves patients with Medicaid, Medicare, and private insurance  [address]  [place],?[place] [address]  [phone]    [facility]  Serves uninsured on a sliding scale, as well as Medicaid, Medicare, and private plans.  Eight locations around the [facility].  [phone]    [facility]  Serves uninsured patients & those with Medicaid, private insurances.  Primary care available as well.  [phone]  [address] [place] [address]    [facility]  Serves veterans who were honorably discharged.  [address] [place] [address]  [phone]  24/7 Veterans Crisis Line: [phone] (Press 1)    If you have private insurance and need to find a specialist, please contact your insurance network to request a list of providers covered by your benefits.      MENTAL HEALTH/ADDICTIVE DISORDERS:     AA ([phone]), NA ([phone])   National Suicide Prevention Lifeline- Call 1-672.743.4566 Available 24 hours everyday  [facility] [phone]; Crisis Line [phone] - Call for options A-F:  Intensive Outpatient Treatment/ Day programs   [facility], please contact   [facility], please contact [phone] or [phone] to speak with an admissions counselor.  [facility] (Methadone Maintenance)   [address] [place] [address], [phone]  [address] [place] [address] [phone]  [facility], [address] [place] [address], [phone]  [facility] [phone]  [facility] please contact [phone]  [facility], [address], 4th [place] [address] Phone: [phone]   [facility]  [place] Office: [address] [place] [address], [phone]  [place] Office: [address] [facility], [address], [place] [address], [phone]  [place] Office: [address] [place], [place] [address] [phone]    Outpatient Substance Abuse Treatment   [facility] (Methadone Maintenance)   [address] [place] [address], [phone]  [address] [place] [address] [phone]  [facility], [address] [address], [phone]  [facility]  [place] Office: [address] [address], [phone]  [place] Office: [address] [facility], [address], [place] [address], [phone]  [place] Office: [address] [place] [address] [phone]  [facility] [facility], [address] [place] [address] [phone]   [facility], [address] [facility], [address], [phone]  [facility], [address] [place], [place] [phone]    Residential Substance Abuse Treatment   [facility] [address] [facility], [phone] or [phone]  [facility], [address] [facility], [phone]  [facility] (men only) [address] [place] [address], [place] [address], [phone]  Women at the [facility] (women only) [address] [place] [address] [phone]  [facility], [address] [place] [address] [phone]  [facility] (women only), intakes at [address] [facility], [phone]  [facility] (7-day program, $100, [address] [place], [phone], [phone], [phone])  [facility]  (Men only, [phone]), [address] (Vets*/Non-Vets)  [facility] (Men only, $400/month program fee) [address], [phone]  [facility] (Women over age 39 only), [address] [facility], [phone]    Out of Area:    [place], [address], [place], [place] ([phone])  [facility] (men only), [address] [facility] [place] [address], [phone]  [facility], [address] [place], [place] [phone])  [place], [facility] [place], [place] ([phone])  [facility], [address] [facility], [phone]  [facility] (Women only, has to have mental illness, can be homeless or substance abuser), [phone]        DOMESTIC VIOLENCE RESOURCES:     Advocacy  [facility] ([facility])  [address] [address]    [facility] [place], [facility] ? [phone]  Services provided: emergency shelter, individual advocacy, information and referrals, group support, children's program, medical advocacy, forensic medical exams, primary care, legal assistance, counseling, safety planning, and caregiver support    [facility] HEALING AND EMPOWERMENT [place]  Confidential location  [facility] ? [phone]  Services provided: short term emergency shelter, all services provided are free of charge    [facility] CENTERS FOR COMMUNITY ADVOCACY  Multiple locations in [place], [facility], [place], [facility], [place], and [facility] ([place], [place], and [place])    [place] ? [phone]  Services provided: emergency shelter, individual advocacy, information and referrals, group support, children's program, medical advocacy, legal assistance in obtaining restraining orders, counseling, safety planning, and caregiver support    [facility]   Emergency Shelter   [phone]  Emergency Services ,Legal and Financial Assistance Services ,Housing Services ,Support Services      [facility]   [phone]  Emergency Services ,Counseling Services , Housing Services ,Support Services ,Children's Services     [facility]OMEN WITH A VISION  [address] [place] [address]  WWAV ? [phone]  Services provided: advocacy, health education and supportive services, specializing in free healing services for marginalized groups, including LGBTQ individuals and sex workers    SEXUAL TRAUMA AWARENESS AND RESPONSE (STAR)  [address] [place] [address]    STAR ? [phone] [phone]  Services provided: individual advocacy, information and referrals, group support, medical advocacy, legal assistance, counseling, and safety planning for survivors of sexual assault    [facility] ([facility])  [address] [place] [address]  [facility] Forensic Program ? [phone]  Services provided: free forensic medical exams for sexual assault and domestic violence, which can be performed up to 5 days after an incident. It is not necessary to make a police report to receive a forensic medical exam    Legal  [facility]  [address] [address]  [facility] ? [phone]  Services provided: free emergency legal representation for survivors of doemstic violence residing in [facility]. Legal services may include temporary restraining orders, temporary child support, custody, and use of property    [facility] LEGAL SERVICES (LS)  [address] [address]  SLLS ? [phone]  Services provided: free legal representation for survivors of domestic violence residing in [facility]. Legal services may include temporary child support, custody, and divorce      HOTLINES:     [facility] DOMESTIC VIOLENCE HOTLINE  [phone]    Services provided: free and confidential hotline for victims and survivors of domestic violence. All calls will be routed to a domestic violence service provided in the  victim or survivor's area    NATIONAL HUMAN TRAFFICKING HOTLINE  ([phone]    Services provided: national anti-trafficking hotline serving victims and survivors of human trafficking. Provides information about local resources, and access to safe space to report tips, seek services, and ask for help    VIA LINK  211 or [phone]    Service provided: counselors can provide crisis counseling. Counselors can also provide information and referrals to programs which can help with needs such as food, shelter, medical care, financial assistance, mental health services, substance abuse treatment, senior services, childcare, and more      HOMELESS SHELTERS:      Homeless shelters  [facility]  Emergency shelter for individuals and families  [address] [facility] [facility]  [phone]  [facility]  Emergency shelter for men only  Meals daily 6am, 2pm, & 6pm  Clothing, case management M-F by appointment (ID/job/housing/legal assistance), mail  [address] [facility]  [phone]  [facility]  Emergency shelter for men  [address] Chana [facility]  [phone]  Emergency shelter for women  [address] [facility]  [phone]  Breakfast & lunch daily, dinner M-F  Case management, job counseling services   [facility]  Emergency shelter for teens and young adults up to 22yo  [address] [facility]  [phone]  [facility]  Emergency shelter for women over 19yo and their kids  [address] [place] [address]  [phone]  [facility]  Day program, meals M-F 1PM (arrive early)  Showers, laundry, hygiene kits, showers, phones, , notary services, case management, ID assistance  [address] [facility]  [phone] M-F 8am-2:30pm  [facility]  Day program  MTWF 7:30am-3:30pm,  8:30am-3:30pm  Crisis intervention, employment assistance, food/clothing, hygiene kits, bus tokens, mail  [address] [facility]  [phone]  [facility]  Mobile outreach for homeless persons in  [facility]  [phone]  Healthcare for the Homeless  Primary healthcare, case management, dental services, TB placement  Call ahead  [address]  2nd Floor  [phone]  [facility] at the [facility]  Connects homeless people with their loved ones in other cities by providing transportation costs   [phone]      [place] RESOURCES:     [place] Mobile Mental Health Crisis Response Team:    Region 12 ([place], [place], [place], and [facility]) ([facility])  [phone]      Outpatient Mental Health & Addiction Clinic Resources for both [facility] and [facility]:    [facility] Mental Healthcare Resources  Website: www.[facility] Camp[facility].org  Main Number: 600-268-3543    [facility] ([facility] Area)  [address] ([address]) [place] [address]  [phone]    [facility] ([facility])  [address] ([address] [facility]) [place] [address]  [phone]    [facility]  [address] ([address]) [place] [address]  [phone]    [facility]  [address] ([address] [facility] [place] [facility]) [place] [address]  [phone]      Addiction Treatment Resources for both [facility]:    [facility] (Detox, Residential, PHP, IOP, and Aftercare Programs)  [address] [place] [address]  [phone]    [facility] (Residential, IOP, Transitional Living, and Aftercare Programs)  [address] [place] [address]  [phone]    [facility] (Inpatient, Residential, Detox, IOP, Outpatient, and Aftercare Programs)  [address] [facility] ([place], [place]), [place] [address] [phone] or toll free at [phone]      Outpatient Mental Health Psychotherapy Resources for both [facility]:    Jaclyn Gardner, [facility]  [address]  [place] [address]  [phone]  Specialties: Depression, Anxiety, and Life Transitions    Clementine Denson, PhD  [address]  [place] [address]  [phone]  Specialties: Testing and Evaluation, Education and Learning Disabilities, and ADHD    Alisa Welch, [facility] [address] [place] [address]  [phone]  Specialties: Obsessive-Compulsive (OCD), Depression, and Relationship Issues    Danette Humphrey [facility] [address] [facility] [place] [address]  [phone]  Specialties: Trauma & PTSD, Mood Disorders, and Anxiety    Danette Kruse, PhD, [facility]  [facility] [address] [place] [address]  [phone]  Specialties: Family Conflict, Child, and Relationship Issues    Jessica Tripp [facility] [place] [address] [phone]  Specialties: Anxiety, Depression, and Anger Management        IN CASE OF SUICIDAL THINKING, call the National Suicide Hotline Number: 988    988 Suicide & Crisis Lifeline: 988 , 9-239-397-TALK (8255)  Provides 24/7, free and confidential support for people in distress, prevention and crisis resources for you or your loved ones, and best practices for professionals.    Call, text or chat.  https://988[employer] Technologyline.org

## 2023-08-07 NOTE — ED NOTES
Pt belongings include: Shirt, socks, shorts, shoes    Security arrived to take valuables and belongings.

## 2023-08-07 NOTE — CONSULTS
"      TELEPSYCHIATRY: INITIAL EVALUATION     ASSESSMENT AND PLAN:     DIAGNOSES & PROBLEMS:  Schizoaffective Disorder, Chronic, Acute Exacerbation    In Summary:  - Patient with schizoaffective disorder vs schizophrenia, presents via crisis unit.  He was recently hospitalized on psych unit - released about a week ago, returns with relapse into acute psychosis.  He reports he is compliant with medication - geodon and prozac - but that they are ineffective.  He is noted to be oddly related, responding to internal stimuli.       Plan:     With reasonable medical certainty, based on history, chart review, available collateral information, and a present-state examination:  - the patient currently meets the criteria for psychiatric hospitalization  - the patient cannot be managed successfully in a less restrictive level of care  - once medically cleared, seek admission for safety/stabilization  - notify interested parties (e.g., emergency contacts, next of kin, family, friends, caregivers), as requested by the patient, of the disposition plan  - enact PEC and ensure elopement precautions (e.g., locked unit, sitter)  - defer adjustments to psychotropic regimen to the inpatient psychiatric treatment team  - defer non-psychiatric medication(s) and management to the current provider(s) of record  - initiate PRN medication for acute agitation while patient is in house       PRESENTATION:     Price Godoy is a 28 y.o. patient seen for an initial psychiatric evaluation.  A history of the presenting illness (HPI) was obtained, and a pertinent psychiatric and medical review of systems was performed.    Per Chart:  Per ED Provider:  Brought in by crises unit (Bharathi). Reports +visual and auditory hallucination since running out of meds. Pt unsure if took meds today. Denies SI. +HI pt states " I feel like the globe is out to get me" Pt staring in the aleyda.  This is a 28 y.o. male with a PMHx of schizoaffective disorder who " "presents via Crisis Unit with complaint of auditory and visual hallucinations. He states that he is currently on medication to combat this, but is short on them at this time. He is unable to make out what the voices say, but notes that the visual hallucinations show ar "things that are not true". He denies any suicidal or homicidal ideations. He also states that he "believes everyone is out to get him". The patient requests either rehab or a long stay at a hospital. He admits to a history of smoking. Patient denies any other complaints at this time.    Per Patient:  - states he is doing good  - here because he is low on meds - Geodon  - "afraid I may not have the medicine I need"  - "I see a lot of things going on" - "primarily at the house"  - "I feel like it's my right to bare arms - I think I need to get one"  - "wants to get another gun for protection"  - no current suicidal ideation/homicidal ideation  - just discharged from psych unit about a week ago  - no longer on monthly shot    REVIEW OF SYSTEMS:  I[]I Patient unable or unwilling to provide any ROS.    [x] Y  [] N  sleep disturbance: **    [x] Y  [] N  appetite/weight change: **  Positive for: decreased appetite, weight loss (unintentional)  [x] Y  [] N  fatigue/anergia: **    [x] Y  [] N  impairment in focus/concentration: **       [x] Y  [] N  depression: **  Positive for: depressed mood Negative for: worthlessness, hopelessness  [x] Y  [] N  anxiety/worry: **     [x] Y  [] N  somatically preoccupied: **   [x] Y  [] N  dysregulated mood/behavior: **  Positive for: moodiness, irritability   [] Y  [x] N  manic symptomatology: **     [x] Y  [] N  psychosis: **  Positive for: paranoia, auditory hallucinations, visual hallucinations         CURRENT PSYCHOTROPIC REGIMEN:  Geodon bid  Fluoxetine 20mg daily    CURRENT PSYCHIATRIC PROVIDER:  None - hasn't seen since discharge from hospital      HISTORY:     I[]I Patient " "unable or unwilling to provide any history.    I[x]I Y  I[]I N  I[]I U  Psychiatric Diagnoses/Symptomatology: Schizophrenia  I[x]I Y  I[]I N  I[]I U  Hx of Psychiatric Hospitalization: multiple, last admitted in July 2023 (last month) per our records - discharged about a week ago  I[x]I Y  I[]I N  I[]I U  Hx of Outpatient Psychiatric Treatment (psychiatry/psychotherapy):   I[x]I Y  I[]I N  I[]I U  Psychotropic Trials: seroquel, remeron, risperdal, invega, prozac  I[x]I Y  I[]I N  I[]I U  Prior Suicide Attempts:   I[x]I Y  I[]I N  I[]I U  Hx of Suicidal Ideation:   I[x]I Y  I[]I N  I[]I U  Hx of Homicidal Ideation:   I[x]I Y  I[]I N  I[]I U  Hx of Self-Injurious Behavior (Non-Suicidal):   I[x]I Y  I[]I N  I[]I U  Hx of Violence:   I[]I Y  I[x]I N  I[]I U  Documented Hx of Malingering:   I[x]I Y  I[]I N  I[]I U  Hx of Detox:   I[]I Y  I[x]I N  I[]I U  Hx of Rehab:     I[x]I Y  I[]I N  I[]I U  I[]I Former  Nicotine Use:    I[x]I Y  I[]I N  I[]I U  I[]I Former  Alcohol Consumption:  last two days ago  I[]I Y  I[x]I N  I[]I U  I[]I Former  Alcohol Misuse/Abuse:   I[]I Y  I[]I N  I[]I U  I[x]I Former  Illicit Drug Use/Misuse/Abuse:   I[]I Y  I[]I N  I[]I U  I[x]I Former  Misuse/Abuse of Rx Medications:   I[x]I Cannabis  I[]I Cocaine  I[]I Heroin  I[]I Meth  I[]I Opioids  I[]I Stimulants  I[]I Benzos  I[x]I Other: MDMA      FAMILY HISTORY:  I[]I Y  I[]I N  I[x]I U    "I'm not sure that is important"    I[x]I Y  I[]I N  I[]I U  Hx of Trauma/Neglect:   I[x]I Y  I[]I N  I[]I U  Hx of Physical Abuse:   I[]I Y  I[x]I N  I[]I U  Hx of Sexual Abuse:   I[]I Y  I[x]I N  I[]I U  Significant Developmental Delay/Disability:   I[x]I Y  I[]I N  I[]I U  GED/High School Diploma or Beyond: some college  I[]I Y  I[x]I N  I[]I U  Currently Employed:   I[]I Y  I[x]I N  I[]I U  On or Applying for Disability: would like to get disability  I[x]I Y  I[]I N  I[]I U  Functions Independently:   I[]I Y  I[x]I N  I[]I U  Financially Stable:   I[]I Y  " "I[x]I N  I[]I U  Domiciled: homeless  I[]I Y  I[]I N  I[x]I U  Intact Support System:   I[]I Y  I[x]I N  I[]I U  Currently in a Romantic Relationship:   I[]I Y  I[x]I N  I[]I U  Ever :   I[]I Y  I[x]I N  I[]I U  Children/Dependents:   I[x]I Y  I[]I N  I[]I U  Evangelical/Spiritual:   I[]I Y  I[x]I N  I[]I U   History:   I[]I Y  I[x]I N  I[]I U  Current Legal Issues:   I[]I Y  I[x]I N  I[]I U  Past Charges/Convictions:   I[]I Y  I[x]I N  I[]I U  Hx of Incarceration:   I[]I Y  I[x]I N  I[]I U  Access to a Gun: patient advised not to get a gun  NOTE: patient counseled on gun safety.  NOTE: patient counseled on increased risks associated with gun ownership.    I[x]I Y  I[]I N  I[]I U  Hx of Seizure: "maybe a pseudoseizure"  I[x]I Y  I[]I N  I[]I U  Hx of Significant Head Injury (e.g., Loss of Consciousness, Concussion, Coma):   I[x]I Y  I[]I N  I[]I U  Medical History & Diagnoses: asthma    The patient's past medical history has been reviewed and updated as appropriate within the electronic medical record system.           Scheduled and PRN Medications: The electronic chart was reviewed and updated as appropriate.  See Medcard for details.           Allergies:  Amoxicillin    Additional Relevant History, As Applicable:       EXAMINATION:     /66 (BP Location: Left arm, Patient Position: Sitting)   Pulse 107   Temp 98.1 °F (36.7 °C) (Oral)   Resp 20   Ht 5' 11" (1.803 m)   Wt 122.5 kg (270 lb)   SpO2 96%   BMI 37.66 kg/m²     MENTAL STATUS EXAMINATION:  General Appearance & Behavior: in hospital garb, cooperative  Involuntary Movements and Motor Activity: no abnormal involuntary movements noted  Speech & Language: answers questions but decreased spontaneity  Mood: depressed  Affect: oddly related, mood incongruent  Thought Process & Associations: disorganized  Thought Content & Perceptions: +auditory hallucinations/visual hallucinations/paranoid ideation  Sensorium and Cognition: alert with " clear sensorium  Insight & Judgment: both impaired      RISK MANAGEMENT:     Risk Parameters:  I[]I Y  I[x]I N  I[]I U  I[]I A  Suicidal Ideation/Behavior: **   I[x]I Y  I[]I N  I[]I U  I[]I A  Homicidal Ideation/Behavior: *has alternatively endorsed/denied - mentioned getting a gun*  I[]I Y  I[x]I N  I[]I U  I[]I A  Violence: **  I[]I Y  I[x]I N  I[]I U  I[]I A  Self-Injurious Behavior: **    The patient is deemed to be an unreliable historian.    I[]I Y  I[x]I N  I[]I U  I[]I A  I[]I N/A  Minimization of Risk Parameters Suspected/Evident: **  I[]I Y  I[x]I N  I[]I U  I[]I A  I[]I N/A  Exaggeration of Risk Parameters Suspected/Evident: **    Current risk is judged to be:   I[]I Low    I[]I Moderate   I[x]I High    [] Y  [x] N  I[]I U  I[]I N/A  Danger to Self:   [x] Y  [] N  I[]I U  I[]I N/A  Danger to Others:   [x] Y  [] N  I[]I U  I[]I N/A  Grave Disability:        Ronni Love MD  Department of Psychiatry, [facility] Board Certified, Psychiatry and Addiction Medicine      MANAGEMENT:     I[]I Y = Yes / Present / Endorses.  I[]I N = No / Absent / Denies.  I[]I U = Unknown / Unable to Assess / Unwilling to Participate.  I[]I A = Ambiguity Exists / Accuracy Uncertain.  I[]I D = Denial or Minimization is Suspected/Evident.  I[]I N/A = Non-Applicable.    Chart Review: Available documentation has been reviewed, and pertinent elements of the chart have been incorporated into this evaluation where appropriate.      [x] In cases of emergencies (e.g. SI/HI resulting in danger to self or others, functioning deteriorates to the level of grave disability), call 911 or [phone], or present to the emergency department for immediate assistance.  [x] Patient should not operate a motor vehicle or heavy machinery if effects of medications or underlying symptoms/condition impair the ability to safely do so.  [x] Comply with ANY/ALL medication fully as prescribed/instructed and report ANY/ALL suspected adverse  effects to appropriate health care providers.    Written material has been provided to supplement, augment, and reinforce any discussions and interventions, via the AVS and/or other pre-printed handouts.  Alcohol, Tobacco, and Drug Counseling, as well as applicable resources, has been provided, as warranted.  Shared medical decision making and informed consent are the hallmark and bedrock of good clinical care, and as such have been employed and obtained, respectively, to the degree possible.  Risk Mitigation Strategies, Harm Reduction Techniques, and Safety Netting are important interventions that can reduce acute and chronic risk, and as such have been employed to the degree possible.  Prescription Drug Management entails the review, recommendation, or consideration without recommendation of medications, and as such was employed during the encounter.  Additional Psychoeducation has been provided, as warranted.      -- Discussed, to the extent possible, diagnosis, risks and benefits of proposed treatment vs alternative treatments vs no treatment, potential side effects of these treatments and the inherent unpredictability of treatment. The patient's ability to understand, participate and engage in a conversation surrounding this was deemed to be: rudimentary.  -- The case was discussed and care was coordinated with member(s) of the treatment team (e.g., primary provider, consulting clinician, psychiatrist, psychotherapist, , , facility staff) including clinical impression, assessment, and treatment recommendations.  -- Member(s) of the treatment team (e.g., primary provider, consulting clinician, psychiatrist, psychotherapist, , , facility staff) were informed of the encounter documentation.      DIAGNOSTIC TESTING:     Blood Counts, Electrolytes & Glucose:   Lab Results   Component Value Date    WBC 7.39 08/07/2023    GRAN 3.8 08/07/2023    GRAN 51.0 08/07/2023     "HGB 12.9 (L) 08/07/2023    HCT 39.2 (L) 08/07/2023    MCV 88 08/07/2023     08/07/2023     08/07/2023    K 3.4 (L) 08/07/2023    CALCIUM 9.1 08/07/2023    MG 1.5 (L) 05/15/2015    CO2 22 (L) 08/07/2023    ANIONGAP 10 08/07/2023    GLU 89 08/07/2023    HGBA1C 4.6 02/18/2023       Renal, Liver, Pancreas, Thyroid, Parathyroid, Prolactin, CPK, Lipids & Vitamin Levels:   Lab Results   Component Value Date    CREATININE 0.9 08/07/2023    BUN 9 08/07/2023    EGFRNORACEVR >60 08/07/2023    SPECGRAV >1.030 (A) 08/07/2023    PROTEINUA Trace (A) 08/07/2023    AST 17 08/07/2023    ALT 28 08/07/2023    ALKPHOS 75 08/07/2023    BILITOT 0.4 08/07/2023    LABPROT 11.9 01/03/2011    ALBUMIN 4.2 08/07/2023    INR 1.2 01/03/2011    LIPASE 33 05/15/2015    TSH 0.703 07/12/2023    FREET4 0.84 09/12/2019     (H) 07/25/2019    CHOL 136 02/18/2023    TRIG 56 02/18/2023    LDLCALC 83.8 02/18/2023    HDL 41 02/18/2023       Therapeutic Drug Levels:   No results found for: "LITHIUM", "VALPROATE", "CBMZ", "LAMOTRIGINE", "CLOZAPINE", "NORCLOZAP", "CLOZNORCLOZ"    Addiction:   Lab Results   Component Value Date    PCDSOBENZOD Negative 08/07/2023    BARBITURATES Negative 08/07/2023    PCDSCOMETHA Negative 08/07/2023    OPIATESCREEN Negative 08/07/2023    COCAINEMETAB Negative 08/07/2023    AMPHETAMINES Negative 08/07/2023    MARIJUANATHC Presumptive Positive (A) 08/07/2023    PCDSOPHENCYN Negative 08/07/2023    ALCOHOLMEDIC <10 08/07/2023       Results for orders placed or performed during the hospital encounter of 06/03/22   EKG 12-lead    Collection Time: 06/03/22 10:08 AM    Narrative    Test Reason : R55,    Vent. Rate : 066 BPM     Atrial Rate : 066 BPM     P-R Int : 148 ms          QRS Dur : 088 ms      QT Int : 406 ms       P-R-T Axes : -04 039 026 degrees     QTc Int : 425 ms    Normal sinus rhythm  Normal ECG  When compared with ECG of 15-FEB-2021 21:34,  No significant change was found  Confirmed by Clau Valdovinos MD " ([id number]) on 6/3/2022 12:54:28 PM    Referred By: NARDA   SELF           Confirmed By:Clau Valdovinos MD       Results for orders placed or performed during the hospital encounter of 06/03/22   CT Head Without Contrast    Narrative    EXAMINATION:  CT HEAD WITHOUT CONTRAST    CLINICAL HISTORY:  Mental status change, unknown cause;    TECHNIQUE:  Low dose axial images were obtained through the head.  Coronal and sagittal reformations were also performed. Contrast was not administered.    COMPARISON:  03/22/2015    FINDINGS:  There is no evidence of hydrocephalus, mass effect, intracranial hemorrhage or acute territorial infarct.  The brain parenchyma maintains normal attenuation.    No calvarial fracture.    Small retention cyst left maxillary sinus.  The mastoid air cells are clear.      Impression    No acute intracranial process.      Electronically signed by: Benitez Reid  Date:    06/03/2022  Time:    10:50       TELEPSYCHIATRY:     Patient agreeable to consultation via telepsychiatry.    This consultation was requested by Robert Mccrary MD, the emergency department attending physician.  The location of the consulting psychiatrist is: [place], [place]  The patient location is:  [facility] EMERGENCY DEPARTMENT  Consultation Setting: emergency department  Also present with the patient at the time of the evaluation: patient evaluated alone    Consults    Complexity (level) of medical decision making employed in the encounter: HIGH    Consult Start Time: 8/7/2023 5:52 PM CDT  Consult End Time: 8/7/2023 6:23 PM CDT  Time with Patient: 17 minutes  Total Time Spent Providing E/M Services: 31 minutes

## 2023-08-07 NOTE — ED NOTES
Pt brought into room by ER tech., wanded by security, pt follows commands but when not answering questions he is giggling and then staring and the ceiling.  Security at bedside w/ direct visual observation, pt cooperative

## 2023-08-08 LAB
CHOLEST SERPL-MCNC: 135 MG/DL (ref 120–199)
CHOLEST/HDLC SERPL: 4.4 {RATIO} (ref 2–5)
ESTIMATED AVG GLUCOSE: 82 MG/DL (ref 68–131)
HBA1C MFR BLD: 4.5 % (ref 4–5.6)
HDLC SERPL-MCNC: 31 MG/DL (ref 40–75)
HDLC SERPL: 23 % (ref 20–50)
LDLC SERPL CALC-MCNC: 79.6 MG/DL (ref 63–159)
NONHDLC SERPL-MCNC: 104 MG/DL
TRIGL SERPL-MCNC: 122 MG/DL (ref 30–150)

## 2023-08-08 PROCEDURE — 36415 COLL VENOUS BLD VENIPUNCTURE: CPT | Performed by: PSYCHIATRY & NEUROLOGY

## 2023-08-08 PROCEDURE — 25000003 PHARM REV CODE 250: Performed by: PSYCHIATRY & NEUROLOGY

## 2023-08-08 PROCEDURE — 11400000 HC PSYCH PRIVATE ROOM

## 2023-08-08 PROCEDURE — 99223 1ST HOSP IP/OBS HIGH 75: CPT | Mod: ,,, | Performed by: PSYCHIATRY & NEUROLOGY

## 2023-08-08 PROCEDURE — 25000003 PHARM REV CODE 250: Performed by: INTERNAL MEDICINE

## 2023-08-08 PROCEDURE — 80061 LIPID PANEL: CPT | Performed by: PSYCHIATRY & NEUROLOGY

## 2023-08-08 PROCEDURE — 99223 PR INITIAL HOSPITAL CARE,LEVL III: ICD-10-PCS | Mod: ,,, | Performed by: PSYCHIATRY & NEUROLOGY

## 2023-08-08 PROCEDURE — 83036 HEMOGLOBIN GLYCOSYLATED A1C: CPT | Performed by: PSYCHIATRY & NEUROLOGY

## 2023-08-08 RX ORDER — ACETAMINOPHEN 325 MG/1
650 TABLET ORAL EVERY 6 HOURS PRN
Status: DISCONTINUED | OUTPATIENT
Start: 2023-08-08 | End: 2023-08-15 | Stop reason: HOSPADM

## 2023-08-08 RX ORDER — IBUPROFEN 200 MG
1 TABLET ORAL DAILY PRN
Status: DISCONTINUED | OUTPATIENT
Start: 2023-08-08 | End: 2023-08-08

## 2023-08-08 RX ORDER — DOCUSATE SODIUM 100 MG/1
100 CAPSULE, LIQUID FILLED ORAL DAILY PRN
Status: DISCONTINUED | OUTPATIENT
Start: 2023-08-08 | End: 2023-08-08

## 2023-08-08 RX ORDER — OLANZAPINE 10 MG/1
10 TABLET ORAL EVERY 8 HOURS PRN
Status: DISCONTINUED | OUTPATIENT
Start: 2023-08-08 | End: 2023-08-15 | Stop reason: HOSPADM

## 2023-08-08 RX ORDER — PROMETHAZINE HYDROCHLORIDE 12.5 MG/1
25 TABLET ORAL EVERY 6 HOURS PRN
Status: DISCONTINUED | OUTPATIENT
Start: 2023-08-08 | End: 2023-08-15 | Stop reason: HOSPADM

## 2023-08-08 RX ORDER — OLANZAPINE 10 MG/1
10 TABLET ORAL EVERY 8 HOURS PRN
Status: DISCONTINUED | OUTPATIENT
Start: 2023-08-08 | End: 2023-08-08

## 2023-08-08 RX ORDER — IBUPROFEN 200 MG
1 TABLET ORAL DAILY
Status: DISCONTINUED | OUTPATIENT
Start: 2023-08-08 | End: 2023-08-08

## 2023-08-08 RX ORDER — HYDROXYZINE PAMOATE 50 MG/1
50 CAPSULE ORAL EVERY 6 HOURS PRN
Status: DISCONTINUED | OUTPATIENT
Start: 2023-08-08 | End: 2023-08-15 | Stop reason: HOSPADM

## 2023-08-08 RX ORDER — ZIPRASIDONE HYDROCHLORIDE 20 MG/1
20 CAPSULE ORAL 2 TIMES DAILY WITH MEALS
Status: DISCONTINUED | OUTPATIENT
Start: 2023-08-08 | End: 2023-08-10

## 2023-08-08 RX ORDER — ACETAMINOPHEN 325 MG/1
650 TABLET ORAL EVERY 6 HOURS PRN
Status: DISCONTINUED | OUTPATIENT
Start: 2023-08-08 | End: 2023-08-08

## 2023-08-08 RX ORDER — MAG HYDROX/ALUMINUM HYD/SIMETH 200-200-20
30 SUSPENSION, ORAL (FINAL DOSE FORM) ORAL EVERY 6 HOURS PRN
Status: DISCONTINUED | OUTPATIENT
Start: 2023-08-08 | End: 2023-08-15 | Stop reason: HOSPADM

## 2023-08-08 RX ORDER — BENZTROPINE MESYLATE 1 MG/ML
2 INJECTION, SOLUTION INTRAMUSCULAR; INTRAVENOUS EVERY 8 HOURS PRN
Status: DISCONTINUED | OUTPATIENT
Start: 2023-08-08 | End: 2023-08-15 | Stop reason: HOSPADM

## 2023-08-08 RX ORDER — IBUPROFEN 200 MG
1 TABLET ORAL DAILY PRN
Status: DISCONTINUED | OUTPATIENT
Start: 2023-08-08 | End: 2023-08-15 | Stop reason: HOSPADM

## 2023-08-08 RX ORDER — OLANZAPINE 10 MG/2ML
10 INJECTION, POWDER, FOR SOLUTION INTRAMUSCULAR EVERY 8 HOURS PRN
Status: DISCONTINUED | OUTPATIENT
Start: 2023-08-08 | End: 2023-08-15 | Stop reason: HOSPADM

## 2023-08-08 RX ORDER — LOPERAMIDE HYDROCHLORIDE 2 MG/1
2 CAPSULE ORAL
Status: DISCONTINUED | OUTPATIENT
Start: 2023-08-08 | End: 2023-08-15 | Stop reason: HOSPADM

## 2023-08-08 RX ORDER — HYDROXYZINE PAMOATE 50 MG/1
50 CAPSULE ORAL NIGHTLY PRN
Status: DISCONTINUED | OUTPATIENT
Start: 2023-08-08 | End: 2023-08-08

## 2023-08-08 RX ORDER — BENZONATATE 100 MG/1
100 CAPSULE ORAL 3 TIMES DAILY PRN
Status: DISCONTINUED | OUTPATIENT
Start: 2023-08-08 | End: 2023-08-15 | Stop reason: HOSPADM

## 2023-08-08 RX ORDER — MAG HYDROX/ALUMINUM HYD/SIMETH 200-200-20
30 SUSPENSION, ORAL (FINAL DOSE FORM) ORAL EVERY 6 HOURS PRN
Status: DISCONTINUED | OUTPATIENT
Start: 2023-08-08 | End: 2023-08-08

## 2023-08-08 RX ORDER — ONDANSETRON 4 MG/1
4 TABLET, ORALLY DISINTEGRATING ORAL EVERY 8 HOURS PRN
Status: DISCONTINUED | OUTPATIENT
Start: 2023-08-08 | End: 2023-08-15 | Stop reason: HOSPADM

## 2023-08-08 RX ORDER — OLANZAPINE 10 MG/2ML
10 INJECTION, POWDER, FOR SOLUTION INTRAMUSCULAR EVERY 8 HOURS PRN
Status: DISCONTINUED | OUTPATIENT
Start: 2023-08-08 | End: 2023-08-08

## 2023-08-08 RX ORDER — LOPERAMIDE HYDROCHLORIDE 2 MG/1
2 CAPSULE ORAL 4 TIMES DAILY PRN
Status: DISCONTINUED | OUTPATIENT
Start: 2023-08-08 | End: 2023-08-08

## 2023-08-08 RX ADMIN — ZIPRASIDONE HYDROCHLORIDE 20 MG: 20 CAPSULE ORAL at 04:08

## 2023-08-08 RX ADMIN — ZIPRASIDONE HYDROCHLORIDE 20 MG: 20 CAPSULE ORAL at 10:08

## 2023-08-08 NOTE — H&P
"[facility]) Hospital Medicine  History & Physical    Patient Name: Price Godoy  MRN: [id number]  Patient Class: IP- Psych  Admission Date: 8/7/2023  Attending Physician: Ravin Alvarado MD   Primary Care Provider: Leroy Howard III, MD         Patient information was obtained from ER records.     Subjective:     Principal Problem:Schizoaffective disorder, depressive type    Chief Complaint: No chief complaint on file.       HPI:   Chief Complaint   Patient presents with    Psychiatric Evaluation       Brought in by crises unit (Bharathi). Reports +visual and auditory hallucination since running out of meds. Pt unsure if took meds today. Denies SI. +HI pt states " I feel like the globe is out to get me" Pt staring in the aleyda.       Time seen by provider: 5:03 PM     This is a 28 y.o. male with a PMHx of schizoaffective disorder who presents via Crisis Unit with complaint of auditory and visual hallucinations. He states that he is currently on medication to combat this, but is short on them at this time. He is unable to make out what the voices say, but notes that the visual hallucinations show ar "things that are not true". He denies any suicidal or homicidal ideations. He also states that he "believes everyone is out to get him". The patient requests either rehab or a long stay at a hospital. He admits to a history of smoking and thinks he needs rehab for this. Patient denies any other complaints at this time.     The history is provided by the patient.          Past Medical History:   Diagnosis Date    Anxiety     Depression     History of psychiatric hospitalization 09/12/2019    [facility] 12/29/2022,05/29/2022,03/15/2021, 07/25/2019and [facility] 12/18/2020,09/12/2019.    Schizophrenia, unspecified     Suicide attempt        Past Surgical History:   Procedure Laterality Date    APPENDECTOMY         Review of patient's allergies indicates:   Allergen Reactions    Amoxicillin  "       No current facility-administered medications on file prior to encounter.     Current Outpatient Medications on File Prior to Encounter   Medication Sig    ziprasidone (GEODON) 20 MG Cap Take 20 mg by mouth 2 (two) times daily.     Family History       Problem Relation (Age of Onset)    Alcohol abuse Paternal Uncle    Depression Paternal Grandmother    Diabetes Maternal Grandmother, Paternal Grandmother    Drug abuse Paternal Uncle    Mental illness Brother    Paranoid behavior Maternal Uncle    Physical abuse Cousin    Schizophrenia Maternal Uncle    Seizures Mother          Tobacco Use    Smoking status: Every Day     Current packs/day: 1.00     Average packs/day: 1 pack/day for 2.0 years (2.0 ttl pk-yrs)     Types: Cigarettes    Smokeless tobacco: Never    Tobacco comments:     Pt stated that he smokes 1 pack of tobacco daily.   Substance and Sexual Activity    Alcohol use: Yes     Alcohol/week: 1.0 standard drink of alcohol     Types: 1 Cans of beer per week     Comment: rarely    Drug use: Not Currently     Comment: last use tonight    Sexual activity: Yes     Partners: Male     Review of Systems   Constitutional:  Negative for fatigue and fever.   HENT:  Negative for congestion, ear pain and sore throat.    Eyes:  Negative for pain and discharge.   Respiratory:  Negative for cough, shortness of breath and wheezing.    Gastrointestinal:  Negative for abdominal pain, constipation, diarrhea, nausea and vomiting.   Endocrine: Negative for cold intolerance and heat intolerance.   Genitourinary:  Negative for difficulty urinating, dysuria and frequency.   Musculoskeletal:  Negative for arthralgias.   Allergic/Immunologic: Negative for environmental allergies.   Neurological:  Negative for dizziness, tremors and seizures.   Psychiatric/Behavioral:  Positive for behavioral problems, hallucinations and sleep disturbance.    All other systems reviewed and are negative.    Objective:     Vital Signs (Most  Recent):  Temp: 97.7 °F (36.5 °C) (08/08/23 0805)  Pulse: 63 (08/08/23 0805)  Resp: 20 (08/08/23 0805)  BP: (!) 119/57 (08/08/23 0805)  SpO2: 96 % (08/08/23 0805) Vital Signs (24h Range):  Temp:  [97.7 °F (36.5 °C)-98.9 °F (37.2 °C)] 97.7 °F (36.5 °C)  Pulse:  [] 63  Resp:  [16-20] 20  SpO2:  [93 %-97 %] 96 %  BP: (118-131)/(57-76) 119/57     Weight: 122.5 kg (270 lb 1 oz)  Body mass index is 37.67 kg/m².     Physical Exam  Vitals and nursing note reviewed.   Constitutional:       Appearance: Normal appearance.   HENT:      Head: Normocephalic and atraumatic.      Nose: Nose normal.      Mouth/Throat:      Mouth: Mucous membranes are moist.      Pharynx: Oropharynx is clear.   Eyes:      Extraocular Movements: Extraocular movements intact.      Conjunctiva/sclera: Conjunctivae normal.      Pupils: Pupils are equal, round, and reactive to light.   Cardiovascular:      Rate and Rhythm: Normal rate and regular rhythm.      Pulses: Normal pulses.      Heart sounds: Normal heart sounds.   Pulmonary:      Effort: Pulmonary effort is normal.      Breath sounds: Normal breath sounds.   Abdominal:      General: Abdomen is flat. Bowel sounds are normal.      Palpations: Abdomen is soft.   Musculoskeletal:         General: Normal range of motion.      Cervical back: Normal range of motion and neck supple.   Skin:     General: Skin is warm and dry.      Capillary Refill: Capillary refill takes less than 2 seconds.      Comments: No rashes on limited skin exam.   Neurological:      General: No focal deficit present.      Mental Status: He is alert and oriented to person, place, and time.      Cranial Nerves: No cranial nerve deficit.      Comments: I Olfactory:  Sense of smell intact    II Optic:  Pupils equal round react to light.  Vision intact.    III, IV, VI, Ocular motor, Trochlear, Abducens:  Extraocular movements intact    V Trigeminal:  Facial sensation intact facial sensation intact,, muscles of mastication intact  muscles of mastication intact, corneal reflex intact, corneal reflex intact    VII Facial:  Muscles of facial expression intact     VIII Vestibular cochlear: Hearing intact vestibular cochlear: Hearing intact    IX Glossopharyngeal:  Gag reflex intact.  Tasting intact.     X Vagus:  Gag reflex intact.    XI Spinal Accessory:  Shoulder shrug intact.  Head rotation intact.    XII Hypoglossal:  Tongue movements intact.     Psychiatric:      Comments: Patient appears depressed.  Tangential               CRANIAL NERVES     CN III, IV, VI   Pupils are equal, round, and reactive to light.       Significant Labs: All pertinent labs within the past 24 hours have been reviewed.    Significant Imaging: I have reviewed all pertinent imaging results/findings within the past 24 hours.    Assessment/Plan:     * Schizoaffective disorder, depressive type  To be admitted to our inpatient psychiatric unit for further evaluation and management.        Mild tetrahydrocannabinol (THC) abuse  Cessation advised.        VTE Risk Mitigation (From admission, onward)    None                     Jeffery Rader Jr, MD  Department of Hospital Medicine  [facility] ([facility])

## 2023-08-08 NOTE — H&P
"PSYCHIATRY INPATIENT ADMISSION NOTE - H & P      8/8/2023 1:42 PM   Price Godoy   1995   [id number]         DATE OF ADMISSION: 8/7/2023 11:25 PM    SITE: [facility]    CURRENT LEGAL STATUS: PEC and/or CEC      HISTORY    CHIEF COMPLAINT   Price Godoy is a 28 y.o. male with a past psychiatric history schizophrenia vs. Schizoaffective disorder  currently admitted to the inpatient unit with the following chief complaint:  bizarre behavior, suicidal ideation- pt would not answer     HPI   The patient was seen and examined. The chart was reviewed.    The patient presented to the ER on 8/7/2023 . Per staff notes:  -pt follows commands but when not answering questions he is giggling and then staring and the ceiling  -Brought in by crises unit (Bharathi). Reports +visual and auditory hallucination since running out of meds. Pt unsure if took meds today. Denies SI. +HI pt states " I feel like the globe is out to get me" Pt staring in the aleyda  -This is a 28 y.o. male with a PMHx of schizoaffective disorder who presents via Crisis Unit with complaint of auditory and visual hallucinations. He states that he is currently on medication to combat this, but is short on them at this time. He is unable to make out what the voices say, but notes that the visual hallucinations show ar "things that are not true". He denies any suicidal or homicidal ideations. He also states that he "believes everyone is out to get him". The patient requests either rehab or a long stay at a hospital. He admits to a history of smoking and thinks he needs rehab for this. Patient denies any other complaints at this time  -Patient with schizoaffective disorder vs schizophrenia, presents via crisis unit.  He was recently hospitalized on psych unit - released about a week ago, returns with relapse into acute psychosis.  He reports he is compliant with medication - geodon and prozac - but that they are ineffective.  He is noted to be oddly " "related, responding to internal stimuli  -- states he is doing good  - here because he is low on meds - Geodon  - "afraid I may not have the medicine I need"  - "I see a lot of things going on" - "primarily at the house"  - "I feel like it's my right to bare arms - I think I need to get one"  - "wants to get another gun for protection"  - no current suicidal ideation/homicidal ideation  - just discharged from psych unit about a week ago  - no longer on monthly shot           The patient was medically cleared and [facility].    The patient was previously seen in 2/2023 with the following HPI:  -Provided to the medical student: Patient has an extensive history of psychiatric hospitalizations. Most recently leaving [facility] 1 month ago. Patient states, "I'm just looking for a home, just been lost for a while." Patient could not elaborate further as he was easily distracted with extensive thought blocking. He states that the abilify was working for him but would prefer daily medication instead of the long acting injection. Patient states this due to, "wanting freedom to feel himself sometimes." He states he doesn't have any support system, transportation, or money to support himself. He has most recently been in [facility]. Patient was pushed to provide more information but repeatedly gave one word answers and changed his answers numerous times.  During the assessment, the patient was a limited historian and unreliable. He denied all symptoms, then endorsed depression. He has negative symptoms of psychosis, with noted RIS. He is homeless. -he has a long history of hospitalizations and medication noncomplaince.      He was stabilized and discharged on Risperdal 4 mg po BID and vistaril.    He had another hospital stay in late June to eay July 2023 for similar reasons.     Today, he would not participate with the assessment. The following was obtained by staff/ observation and chart review.          "   Symptoms of Depression: diminished mood - Yes, loss of interest/anhedonia - Yes;  recurrent - Yes, >14 days - Yes, diminished energy - Yes, change in sleep - No, change in appetite - No, diminished concentration or cognition or indecisiveness - Yes, PMA/R -  No, excessive guilt or hopelessness or worthlessness - Yes, suicidal ideations - Yes     Changes in Sleep: trouble with initiation- yes, maintenance, - No early morning awakening with inability to return to sleep - No, hypersomnolence - No     Suicidal- active/passive ideations - yes, organized plans, future intentions - No     Homicidal ideations: active/passive ideations - No, organized plans, future intentions - No     Symptoms of psychosis: hallucinations - yes, delusions - No, disorganized speech - No, disorganized behavior or abnormal motor behavior - No, or negative symptoms (diminshed emotional expression, avolition, anhedonia, alogia, asociality) - No, active phase symptoms >1 month - No, continuous signs of illness > 6 months - No, since onset of illness decreased level of functioning present - No  +h/o psychosis/schizophrenia per chart     Symptoms of faby or hypomania: elevated, expansive, or irritable mood with increased energy or activity - No; > 4 days - No,  >7 days - No; with inflated self-esteem or grandiosity - No, decreased need for sleep - No, increased rate of speech - No, FOI or racing thoughts - No, distractibility - No, increased goal directed activity or PMA - No, risky/disinhibited behavior - No     Symptoms of MAGGIE: excessive anxiety/worry/fear, more days than not, about numerous issues - yes, ongoing for >6 months - No, difficult to control - yes, with restlessness - No, fatigue - yes, poor concentration - yes, irritability - No, muscle tension - No, sleep disturbance - yes; causes functionally impairing distress - No     Symptoms of Panic Disorder: recurrent panic attacks (palpitations/heart racing, sweating, shakiness, dyspnea,  "choking, chest pain/discomfort, Gi symptoms, dizzy/lightheadedness, hot/col flashes, paresthesias, derealization, fear of losing control or fear of dying or fear of "going crazy") - No, precipitated - No, un-precipitated - No, source of worry and/or behavioral changes secondary for 1 month or longer- No, agoraphobia - No     Symptoms of PTSD: h/o trauma exposure - No; re-experiencing/intrusive symptoms - No, avoidant behavior - No, 2 or more negative alterations in cognition or mood - No, 2 or more hyperarousal symptoms - No; with dissociative symptoms - No, ongoing for 1 or more  months - No     Symptoms of OCD: obsessions (recurrent thoughts/urges/images; intrusive and/or unwanted; uses other thoughts/actions to suppress) - No; compulsions (repetitive behaviors used to lower distress/anxiety/obsessions) - No, time-consuming (over 1 hour per day) or cause significant distress/impairment - - No     Symptoms of Anorexia: restriction of caloric intake leading to significantly low body weight - No, intense fear of gaining weight or persistent behavior that interferes with weight gain even thought at a significantly low weight - No, disturbance in the way in which one's body weight or shape is experienced, undue influence of body weight or shape on self evaluation, or persistent lack of recognition of the seriousness of the current low body weight - No     Symptoms of Bulimia: recurrent episodes of binge eating (definitely larger amount  than what others would eat and lack of a sense of control over eating during episode) - No, recurrent inappropriate compensatory behaviors in order to prevent weight gain (fasting, medications, exercise, vomiting) - No, binges and compensatory behaviors both occur on average at least once a week for 3 months - No, self evaluations is unduly influenced by body shape/weight- - No     Symptoms of Binge eating: recurrent episodes of binge eating (definitely larger amount than what others " would eat and lack of a sense of control over eating during episode) - No, 3 or more of following (eating much more rapidly, eating until uncomfortably full, large amounts when not hungry, eating alone because of embarrassed by how much,  feeling disgusted with oneself, depressed or very guilty afterward) - No, distress regarding binges - No, binges occur on average at least once a week for 3 months - No        Substance/s:  Taken in larger amounts or over longer periods than intended: No,  Persistent desire or unsuccessful attempts to cut down or stop: No,  Great deal of time spent seeking, using or recovering from: No,  Craving or strong desire to use: No,  Recurrent use despite failure to meet major role obligation: No,  Continued use despite persistent or recurrent social/interparsonal issues due to use: No,  Important social/work/recreational activities given up due to use: No,  Recurrent use in physically hazardous situations: No,  Continued use despite knowledge of persistent physical or psychological problem: No,  Tolerance (either increased need or diminished effect): No          PAST PSYCHIATRIC HISTORY  Previous Psychiatric Hospitalizations: Yes  Previous SI/HI: Yes,  Previous Suicide Attempts: No,   Previous Medication Trials: Yes,  Psychiatric Care (current & past): No,  History of Psychotherapy: Yes,  History of Violence: No,  History of sexual/physical abuse: No,    PAST MEDICAL & SURGICAL HISTORY   Past Medical History:   Diagnosis Date    Anxiety     Depression     History of psychiatric hospitalization 09/12/2019    [facility] 12/29/2022,05/29/2022,03/15/2021, 07/25/2019and [facility] 12/18/2020,09/12/2019.    Schizophrenia, unspecified     Suicide attempt      Past Surgical History:   Procedure Laterality Date    APPENDECTOMY           CURRENT PSYCH MEDICATION REGIMEN   Geodon  Current Medication side effects:  no  Current Medication compliance:  no    Previous psych meds trials  Wellbutrin, risperdal, and  others (see below)    Home Meds:   Prior to Admission medications    Medication Sig Start Date End Date Taking? Authorizing Provider   ziprasidone (GEODON) 20 MG Cap Take 20 mg by mouth 2 (two) times daily. 7/20/23   Provider, Historical           OTC Meds: none    Scheduled Meds:        PRN Meds: acetaminophen, aluminum-magnesium hydroxide-simethicone, docusate sodium, hydrOXYzine pamoate, loperamide, nicotine, OLANZapine **AND** OLANZapine   Psychotherapeutics (From admission, onward)      Start     Stop Route Frequency Ordered    08/08/23 0638  OLANZapine tablet 10 mg  (Olanzapine PRN (</= 66 yo))        See Hyperspace for full Linked Orders Report.    -- Oral Every 8 hours PRN 08/08/23 0538    08/08/23 0638  OLANZapine injection 10 mg  (Olanzapine PRN (</= 66 yo))        See Hyperspace for full Linked Orders Report.    -- IM Every 8 hours PRN 08/08/23 0538            ALLERGIES   Review of patient's allergies indicates:   Allergen Reactions    Amoxicillin        NEUROLOGIC HISTORY  Seizures: No  Head trauma: No     SOCIAL HISTORY:  Developmental/Childhood:Achieved all developmental milestones timely  Education: Some college   Employment Status/Finances:Unemployed   Relationship Status/Sexual Orientation: single  Children: 0  Housing Status: Homeless    history:  NO   Access to Firearms: NO ;  Locked up? n/a  Voodoo:Non-spiritual  Recreational activities: none      SUBSTANCE ABUSE HISTORY   Recreational Drugs: ecstasy and marijuana   Use of Alcohol: minimal use  Rehab History:yes   Tobacco Use:yes     LEGAL HISTORY:   Past charges/incarcerations: NO  Pending charges:NO    FAMILY PSYCHIATRIC HISTORY   Family History   Problem Relation Age of Onset    Seizures Mother     Mental illness Brother     Paranoid behavior Maternal Uncle     Schizophrenia Maternal Uncle     Drug abuse Paternal Uncle     Alcohol abuse Paternal Uncle     Diabetes Maternal Grandmother     Depression Paternal Grandmother     Diabetes  Paternal Grandmother     Physical abuse Cousin          ROS  General ROS: negative  Ophthalmic ROS: negative  ENT ROS: negative  Allergy and Immunology ROS: negative  Hematological and Lymphatic ROS: negative  Endocrine ROS: negative  Respiratory ROS: no cough, shortness of breath, or wheezing  Cardiovascular ROS: no chest pain or dyspnea on exertion  Gastrointestinal ROS: no abdominal pain, change in bowel habits, or black or bloody stools  Genito-Urinary ROS: no dysuria, trouble voiding, or hematuria  Musculoskeletal ROS: negative  Neurological ROS: no TIA or stroke symptoms  Dermatological ROS: negative        EXAMINATION    PHYSICAL EXAM  Reviewed note/exam by Dr. Mccrary from 8/7/23 at 5:02 PM; Med consulted for initial physical exam- pending    VITALS   Vitals:    08/08/23 0805   BP: (!) 119/57   Pulse: 63   Resp: 20   Temp: 97.7 °F (36.5 °C)        Body mass index is 37.67 kg/m².        PAIN  0/10  Subjective report of pain matches objective signs and symptoms: Yes    LABORATORY DATA   Recent Results (from the past 72 hour(s))   HIV 1/2 Ag/Ab (4th Gen)    Collection Time: 08/07/23  4:39 PM   Result Value Ref Range    HIV 1/2 Ag/Ab Negative Negative   Hepatitis C Antibody    Collection Time: 08/07/23  4:39 PM   Result Value Ref Range    Hepatitis C Ab Negative Negative   Urinalysis, Reflex to Urine Culture Urine, Clean Catch    Collection Time: 08/07/23  4:59 PM    Specimen: Urine   Result Value Ref Range    Specimen UA Urine, Clean Catch     Color, UA Yellow Yellow, Straw, Luz    Appearance, UA Clear Clear    pH, UA 6.0 5.0 - 8.0    Specific Gravity, UA >1.030 (A) 1.005 - 1.030    Protein, UA Trace (A) Negative    Glucose, UA Negative Negative    Ketones, UA Negative Negative    Bilirubin (UA) Negative Negative    Occult Blood UA Negative Negative    Nitrite, UA Negative Negative    Urobilinogen, UA 2.0-3.0 (A) <2.0 EU/dL    Leukocytes, UA Negative Negative   CBC auto differential    Collection Time:  08/07/23  5:12 PM   Result Value Ref Range    WBC 7.39 3.90 - 12.70 K/uL    RBC 4.44 (L) 4.60 - 6.20 M/uL    Hemoglobin 12.9 (L) 14.0 - 18.0 g/dL    Hematocrit 39.2 (L) 40.0 - 54.0 %    MCV 88 82 - 98 fL    MCH 29.1 27.0 - 31.0 pg    MCHC 32.9 32.0 - 36.0 g/dL    RDW 12.3 11.5 - 14.5 %    Platelets 281 150 - 450 K/uL    MPV 9.4 9.2 - 12.9 fL    Immature Granulocytes 0.3 0.0 - 0.5 %    Gran # (ANC) 3.8 1.8 - 7.7 K/uL    Immature Grans (Abs) 0.02 0.00 - 0.04 K/uL    Lymph # 2.8 1.0 - 4.8 K/uL    Mono # 0.6 0.3 - 1.0 K/uL    Eos # 0.1 0.0 - 0.5 K/uL    Baso # 0.03 0.00 - 0.20 K/uL    nRBC 0 0 /100 WBC    Gran % 51.0 38.0 - 73.0 %    Lymph % 38.4 18.0 - 48.0 %    Mono % 8.1 4.0 - 15.0 %    Eosinophil % 1.8 0.0 - 8.0 %    Basophil % 0.4 0.0 - 1.9 %    Differential Method Automated    Comprehensive metabolic panel    Collection Time: 08/07/23  5:12 PM   Result Value Ref Range    Sodium 140 136 - 145 mmol/L    Potassium 3.4 (L) 3.5 - 5.1 mmol/L    Chloride 108 95 - 110 mmol/L    CO2 22 (L) 23 - 29 mmol/L    Glucose 89 70 - 110 mg/dL    BUN 9 6 - 20 mg/dL    Creatinine 0.9 0.5 - 1.4 mg/dL    Calcium 9.1 8.7 - 10.5 mg/dL    Total Protein 7.4 6.0 - 8.4 g/dL    Albumin 4.2 3.5 - 5.2 g/dL    Total Bilirubin 0.4 0.1 - 1.0 mg/dL    Alkaline Phosphatase 75 55 - 135 U/L    AST 17 10 - 40 U/L    ALT 28 10 - 44 U/L    eGFR >60 >60 mL/min/1.73 m^2    Anion Gap 10 8 - 16 mmol/L   TSH    Collection Time: 08/07/23  5:12 PM   Result Value Ref Range    TSH 0.215 (L) 0.400 - 4.000 uIU/mL   Drug screen panel, emergency    Collection Time: 08/07/23  5:12 PM   Result Value Ref Range    Benzodiazepines Negative Negatiive    Methadone metabolites Negative Negative    Cocaine (Metab.) Negative Negative    Opiate Scrn, Ur Negative Negative    Barbiturate Screen, Ur Negative Negative    Amphetamine Screen, Ur Negative Negative    THC Presumptive Positive (A) Negative    Phencyclidine Negative Negative    Creatinine, Urine 412.4 (H) 23.0 - 375.0  "mg/dL    Toxicology Information SEE COMMENT    Ethanol    Collection Time: 08/07/23  5:12 PM   Result Value Ref Range    Alcohol, Serum <10 <10 mg/dL   Acetaminophen level    Collection Time: 08/07/23  5:12 PM   Result Value Ref Range    Acetaminophen (Tylenol), Serum <3.0 (L) 10.0 - 20.0 ug/mL   T4, Free    Collection Time: 08/07/23  5:12 PM   Result Value Ref Range    Free T4 1.01 0.71 - 1.51 ng/dL   Lipid Panel    Collection Time: 08/08/23  5:36 AM   Result Value Ref Range    Cholesterol 135 120 - 199 mg/dL    Triglycerides 122 30 - 150 mg/dL    HDL 31 (L) 40 - 75 mg/dL    LDL Cholesterol 79.6 63.0 - 159.0 mg/dL    HDL/Cholesterol Ratio 23.0 20.0 - 50.0 %    Total Cholesterol/HDL Ratio 4.4 2.0 - 5.0    Non-HDL Cholesterol 104 mg/dL   Hemoglobin A1C    Collection Time: 08/08/23  5:36 AM   Result Value Ref Range    Hemoglobin A1C 4.5 4.0 - 5.6 %    Estimated Avg Glucose 82 68 - 131 mg/dL      No results found for: "PHENYTOIN", "PHENOBARB", "VALPROATE", "CBMZ"      CONSTITUTIONAL  General Appearance: unremarkable, age appropriate     MUSCULOSKELETAL  Muscle Strength and Tone:not examined, no tremor, no tic  Abnormal Involuntary Movements: No  Gait and Station: non-ataxic     PSYCHIATRIC   Level of Consciousness: awake and alert   Orientation: person, place, situation, and time  Grooming: hospital karissa  Psychomotor Behavior: uncooperative, minimal eye contact minimal  Speech: normal tone, normal rate, normal volume, soft  Language: grossly intact  Mood: would not answer"  Affect: Flat  Thought Process: blocking and loose associations  Associations: loose at times   Thought Content: denies SI and denies HI  Perceptions: denies AH and denies  VH  Memory: Able to recall past events, Remote intact, and Recent intact  Attention:Easily distracted  Fund of Knowledge: Aware of current events and Vocabulary appropriate   Estimate if Intelligence:  Average based on work/education history, vocabulary and mental status " exam  Insight: limited awareness of illness  Judgment: limited secondary to psychosis    PSYCHOSOCIAL    PSYCHOSOCIAL STRESSORS   family, financial, health, and occupational     FUNCTIONING RELATIONSHIPS   alone & isolated    STRENGTHS AND LIABILITIES   Strength: Patient accepts guidance/feedback, Strength: Patient is expressive/articulate., Liability: Patient is unstable., Liability: Patient lacks coping skills.    Is the patient aware of the biomedical complications associated with substance abuse and mental illness? yes    Does the patient have an Advance Directive for Mental Health treatment? no  (If yes, inform patient to bring copy.)        MEDICAL DECISION MAKING        ASSESSMENT       Schizophrneia, chronic with acute exacerbation  MDD, recurrent, severe with psychotic features  Unspecified Anxiety Disorder    Psychosocial stressors    Nicotine dependence  Polysubstance abuse      PROBLEM LIST AND MANAGEMENT PLANS    Psychosis: pt counseled  -resume Geodon 20 mg po BID    Depression: pt counseled  -will plan to resume Wellbutrin XL at 150 mg po q day if pt willing pending further assessment    Anxiety: pt counseled  -start vistaril prn    Psychosocial stressors: pt counseled  -SW consulted to assist with resources    Nicotine dependence: pt counseled  -start Nicotine 14 mg patch dermal q day    Polysubstance abuse: pt counseled  -rehab if willing      PRESCRIPTION DRUG MANAGEMENT  Compliance: no  Side Effects: no  Regimen Adjustments: see above    Discussed diagnosis, risks and benefits of proposed treatment vs alternative treatments vs no treatment, potential side effects of these treatments and the inherent unpredictability of treatment. The patient expresses understanding of the above and displays the capacity to agree with this treatment given said understanding. Patient also agrees that, currently, the benefits outweigh the risks and would like to pursue/continue treatment at this time.    Any  medications being used off-label were discussed with the patient inclusive of the evidence base for the use of the medications and consent was obtained for the off-label use of the medication.         DIAGNOSTIC TESTING  Labs reviewed with patient; follow up pending labs    Disposition:  -Will attempt to obtain outside psychiatric records if available  -SW to assist with aftercare planning and collateral  -Once stable discharge home with outpatient follow up care and/or rehab  -Continue inpatient treatment under a PEC and/or CEC for danger to self/ danger to others/grave disability as evident by significant psychotic thought disorder, aggressive neuroleptic titration, danger to others, gravely disabled, suicidal ideation, and severe obstacles to placement        Ravin Alvarado MD  Psychiatry

## 2023-08-08 NOTE — ED NOTES
Pt in the L lateral recumbent position, A & O x 3. Pt has no complaint at this time, is calm and cooperative. Respirations are even and unlabored. Skin is warm, dry and pink. ER tech maintains constant supervision of pt at all times. Will continue to monitor closely.

## 2023-08-08 NOTE — NURSING
28 year old male admitted from [facility] ER for homicidal ideation and audio visual hallucinations.  Pt was brought to ED by crisis unit.  Pt states that he was brought to the ER after he called 911 and told them he was low on his medication.  Pt was calm with flat affect on arrival to unit.  Pt refused to sign consents and acknowledgements.  Pt reactive to internal stimuli.  Pt states that he began having symptoms after he ran out of his medication and was unable to remember when he last took his medication.  Pt is paranoid and states that he thinks everyone is out to get him.  UDS + for THC.

## 2023-08-08 NOTE — PLAN OF CARE
POC reviewed and is ongoing. Pt self isolated to room. Pt is withdrawn, quiet, and anxious. Doesn't talk much to staff or peers. Pt has eaten all meals and is medication compliant. Will continue to monitor closely while on unit.

## 2023-08-08 NOTE — PLAN OF CARE
Problem: Adult Behavioral Health Plan of Care  Goal: Plan of Care Review  Outcome: Ongoing, Progressing  Goal: Patient-Specific Goal (Individualization)  Outcome: Ongoing, Progressing  Goal: Adheres to Safety Considerations for Self and Others  Outcome: Ongoing, Progressing  Goal: Absence of New-Onset Illness or Injury  Outcome: Ongoing, Progressing  Goal: Optimized Coping Skills in Response to Life Stressors  Outcome: Ongoing, Progressing     Problem: Violence Risk or Actual  Goal: Anger and Impulse Control  Outcome: Ongoing, Progressing

## 2023-08-08 NOTE — SUBJECTIVE & OBJECTIVE
Past Medical History:   Diagnosis Date    Anxiety     Depression     History of psychiatric hospitalization 09/12/2019    [facility] 12/29/2022,05/29/2022,03/15/2021, 07/25/2019and [facility] 12/18/2020,09/12/2019.    Schizophrenia, unspecified     Suicide attempt        Past Surgical History:   Procedure Laterality Date    APPENDECTOMY         Review of patient's allergies indicates:   Allergen Reactions    Amoxicillin        No current facility-administered medications on file prior to encounter.     Current Outpatient Medications on File Prior to Encounter   Medication Sig    ziprasidone (GEODON) 20 MG Cap Take 20 mg by mouth 2 (two) times daily.     Family History       Problem Relation (Age of Onset)    Alcohol abuse Paternal Uncle    Depression Paternal Grandmother    Diabetes Maternal Grandmother, Paternal Grandmother    Drug abuse Paternal Uncle    Mental illness Brother    Paranoid behavior Maternal Uncle    Physical abuse Cousin    Schizophrenia Maternal Uncle    Seizures Mother          Tobacco Use    Smoking status: Every Day     Current packs/day: 1.00     Average packs/day: 1 pack/day for 2.0 years (2.0 ttl pk-yrs)     Types: Cigarettes    Smokeless tobacco: Never    Tobacco comments:     Pt stated that he smokes 1 pack of tobacco daily.   Substance and Sexual Activity    Alcohol use: Yes     Alcohol/week: 1.0 standard drink of alcohol     Types: 1 Cans of beer per week     Comment: rarely    Drug use: Not Currently     Comment: last use tonight    Sexual activity: Yes     Partners: Male     Review of Systems   Constitutional:  Negative for fatigue and fever.   HENT:  Negative for congestion, ear pain and sore throat.    Eyes:  Negative for pain and discharge.   Respiratory:  Negative for cough, shortness of breath and wheezing.    Gastrointestinal:  Negative for abdominal pain, constipation, diarrhea, nausea and vomiting.   Endocrine: Negative for cold intolerance and heat intolerance.   Genitourinary:  Negative  for difficulty urinating, dysuria and frequency.   Musculoskeletal:  Negative for arthralgias.   Allergic/Immunologic: Negative for environmental allergies.   Neurological:  Negative for dizziness, tremors and seizures.   Psychiatric/Behavioral:  Positive for behavioral problems, hallucinations and sleep disturbance.    All other systems reviewed and are negative.    Objective:     Vital Signs (Most Recent):  Temp: 97.7 °F (36.5 °C) (08/08/23 0805)  Pulse: 63 (08/08/23 0805)  Resp: 20 (08/08/23 0805)  BP: (!) 119/57 (08/08/23 0805)  SpO2: 96 % (08/08/23 0805) Vital Signs (24h Range):  Temp:  [97.7 °F (36.5 °C)-98.9 °F (37.2 °C)] 97.7 °F (36.5 °C)  Pulse:  [] 63  Resp:  [16-20] 20  SpO2:  [93 %-97 %] 96 %  BP: (118-131)/(57-76) 119/57     Weight: 122.5 kg (270 lb 1 oz)  Body mass index is 37.67 kg/m².     Physical Exam  Vitals and nursing note reviewed.   Constitutional:       Appearance: Normal appearance.   HENT:      Head: Normocephalic and atraumatic.      Nose: Nose normal.      Mouth/Throat:      Mouth: Mucous membranes are moist.      Pharynx: Oropharynx is clear.   Eyes:      Extraocular Movements: Extraocular movements intact.      Conjunctiva/sclera: Conjunctivae normal.      Pupils: Pupils are equal, round, and reactive to light.   Cardiovascular:      Rate and Rhythm: Normal rate and regular rhythm.      Pulses: Normal pulses.      Heart sounds: Normal heart sounds.   Pulmonary:      Effort: Pulmonary effort is normal.      Breath sounds: Normal breath sounds.   Abdominal:      General: Abdomen is flat. Bowel sounds are normal.      Palpations: Abdomen is soft.   Musculoskeletal:         General: Normal range of motion.      Cervical back: Normal range of motion and neck supple.   Skin:     General: Skin is warm and dry.      Capillary Refill: Capillary refill takes less than 2 seconds.      Comments: No rashes on limited skin exam.   Neurological:      General: No focal deficit present.      Mental  Status: He is alert and oriented to person, place, and time.      Cranial Nerves: No cranial nerve deficit.      Comments: I Olfactory:  Sense of smell intact    II Optic:  Pupils equal round react to light.  Vision intact.    III, IV, VI, Ocular motor, Trochlear, Abducens:  Extraocular movements intact    V Trigeminal:  Facial sensation intact facial sensation intact,, muscles of mastication intact muscles of mastication intact, corneal reflex intact, corneal reflex intact    VII Facial:  Muscles of facial expression intact     VIII Vestibular cochlear: Hearing intact vestibular cochlear: Hearing intact    IX Glossopharyngeal:  Gag reflex intact.  Tasting intact.     X Vagus:  Gag reflex intact.    XI Spinal Accessory:  Shoulder shrug intact.  Head rotation intact.    XII Hypoglossal:  Tongue movements intact.     Psychiatric:      Comments: Patient appears depressed.  Tangential               CRANIAL NERVES     CN III, IV, VI   Pupils are equal, round, and reactive to light.       Significant Labs: All pertinent labs within the past 24 hours have been reviewed.    Significant Imaging: I have reviewed all pertinent imaging results/findings within the past 24 hours.

## 2023-08-08 NOTE — ED NOTES
Rec'd report from Maribel SANZ RN. Pt is a PEC pt w/ sitter at the door. Pt is in semi-fowlers position in bed eating w/o complaint at this time. Pt request juice. Pt denies pain/discomfort. Pt is A & O x 3, denies SOB, respiratory distress and respirations are even and unlabored. Skin is warm and dry w/ pink mucosa. Bed is locked and in the low position w/ the side rails up and locked for safety. Call bell @ BS. Will continue to monitor closely.

## 2023-08-08 NOTE — CONSULTS
RD consulted for new admit. Spoke with RN and RN stated that the pt has no dietary issues at this time. RD to sign off. Please consult if any nutrition/dietary issues arise.

## 2023-08-08 NOTE — ED NOTES
Pt in the semi- hood's position, A & O x 3, denies distress at this time. Respirations are even and unlabored. Skin is warm, dry and pink. Pt remains under constant supervision by ER tech at all times. Will continue to monitor closely.

## 2023-08-09 PROCEDURE — 99232 PR SUBSEQUENT HOSPITAL CARE,LEVL II: ICD-10-PCS | Mod: ,,, | Performed by: PSYCHIATRY & NEUROLOGY

## 2023-08-09 PROCEDURE — 25000003 PHARM REV CODE 250: Performed by: INTERNAL MEDICINE

## 2023-08-09 PROCEDURE — 11400000 HC PSYCH PRIVATE ROOM

## 2023-08-09 PROCEDURE — 99232 SBSQ HOSP IP/OBS MODERATE 35: CPT | Mod: ,,, | Performed by: PSYCHIATRY & NEUROLOGY

## 2023-08-09 PROCEDURE — 90833 PSYTX W PT W E/M 30 MIN: CPT | Mod: ,,, | Performed by: PSYCHIATRY & NEUROLOGY

## 2023-08-09 PROCEDURE — 90833 PR PSYCHOTHERAPY W/PATIENT W/E&M, 30 MIN (ADD ON): ICD-10-PCS | Mod: ,,, | Performed by: PSYCHIATRY & NEUROLOGY

## 2023-08-09 RX ORDER — ADHESIVE BANDAGE
30 BANDAGE TOPICAL DAILY PRN
Status: DISCONTINUED | OUTPATIENT
Start: 2023-08-09 | End: 2023-08-15 | Stop reason: HOSPADM

## 2023-08-09 RX ORDER — BUPROPION HYDROCHLORIDE 150 MG/1
150 TABLET ORAL DAILY
Status: DISCONTINUED | OUTPATIENT
Start: 2023-08-10 | End: 2023-08-11

## 2023-08-09 RX ADMIN — ZIPRASIDONE HYDROCHLORIDE 20 MG: 20 CAPSULE ORAL at 07:08

## 2023-08-09 RX ADMIN — ALUMINUM HYDROXIDE, MAGNESIUM HYDROXIDE, AND SIMETHICONE 30 ML: 200; 200; 20 SUSPENSION ORAL at 05:08

## 2023-08-09 RX ADMIN — ZIPRASIDONE HYDROCHLORIDE 20 MG: 20 CAPSULE ORAL at 05:08

## 2023-08-09 NOTE — PLAN OF CARE
POC reviewed this shift and is on going. Patient is withdrawn, depressed, anxious, pacing, showing pressured speech, and paranoid. Endorses Suicidal Ideation, Auditory Hallucinations, and Visual Hallucinations. Denies Homicidal Ideation, Tactile Hallucinations, Gustatory Hallucinations, and Delusions. Minimal interaction with peers,self isolating. Pt continues to be medication compliant and staff will continue to monitor pt closely while on the unit.
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
## 2023-08-09 NOTE — NURSING
POC reviewed this shift and is on going.  Pt is calm and cooperative on unit.  Pt isolated to self.

## 2023-08-09 NOTE — PROGRESS NOTES
"PSYCHIATRY DAILY INPATIENT PROGRESS NOTE  SUBSEQUENT HOSPITAL VISIT    ENCOUNTER DATE: 8/9/2023  SITE: [facility]    DATE OF ADMISSION: 8/7/2023 11:25 PM  LENGTH OF STAY: 2 days    CHIEF COMPLAINT   Price Godoy is a 28 y.o. male, seen during daily chamberlain rounds on the inpatient unit.  Price Godoy presented with the chief complaint of bizarre behavior, suicidal ideation- pt would not answer     The patient was seen and examined. The chart was reviewed.     Reviewed notes from Rns and MD and labs from the last 24 hours.    The patient's case was discussed with the treatment team/care providers today including Rns and MD    Staff reports no behavioral or management issues.     The patient has been compliant with treatment.    Subjective 08/09/2023    Patient observed pacing unit, appears to be talking to self/RIS, however pt denies auditory hallucinations during assessment. Pt states reason for hospitalization is "I needed to get back on my medication, I was worried I was going to run out." Patient noted to be somewhat hyperverbal with flight of ideas. 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     The patient denies any side effects to medications.    Psychiatric ROS (observed, reported, or endorsed/denied):  Depressed mood - fluctuating  Interest/pleasure/anhedonia: fluctuating  Guilt/hopelessness/worthlessness - No  Changes in Sleep - No  Changes in Appetite - No  Changes in Concentration - No  Changes in Energy - No  PMA/R- No  Suicidal- active/passive ideations - fluctuating  Homicidal ideations: " active/passive ideations - No    Hallucinations - Yes  Delusions - No  Disorganized behavior - No  Disorganized speech - Yes  Negative symptoms - Yes    Elevated mood - No  Decreased need for sleep - No  Grandiosity - No  Racing thoughts - No  Impulsivity - No  Irritability- No  Increased energy - No  Distractibility - Yes  Increase in goal-directed activity or PMA- No    Symptoms of MAGGIE - No  Symptoms of Panic Disorder- No  Symptoms of PTSD - No    Overall progress: Patient is showing mild improvement     Psychotherapy:  Target symptoms: depression, psychosis  Why chosen therapy is appropriate versus another modality: relevant to diagnosis, evidence based practice  Outcome monitoring methods: self-report, observation  Therapeutic intervention type: insight oriented psychotherapy, supportive psychotherapy  Topics discussed/themes: building skills sets for symptom management, symptom recognition  The patient's response to the intervention is accepting. The patient's progress toward treatment goals is fair.   Duration of intervention: 16 minutes.    Medical ROS  Review of Systems   Constitutional: Negative.    HENT: Negative.     Eyes: Negative.    Respiratory: Negative.     Cardiovascular: Negative.    Gastrointestinal:  Positive for constipation.   Musculoskeletal: Negative.    Skin: Negative.    Neurological: Negative.    Endo/Heme/Allergies: Negative.    Psychiatric/Behavioral:  Positive for hallucinations.        PAST MEDICAL HISTORY   Past Medical History:   Diagnosis Date    Anxiety     Depression     History of psychiatric hospitalization 09/12/2019    [facility] 12/29/2022,05/29/2022,03/15/2021, 07/25/2019and [facility] 12/18/2020,09/12/2019.    Schizophrenia, unspecified     Suicide attempt        PSYCHOTROPIC MEDICATIONS   Scheduled Meds:   ziprasidone  20 mg Oral BID WM     Continuous Infusions:  PRN Meds:.acetaminophen, aluminum-magnesium hydroxide-simethicone, benzonatate, benztropine mesylate, hydrOXYzine pamoate,  loperamide, nicotine, OLANZapine **AND** OLANZapine, ondansetron, promethazine    EXAMINATION    VITALS   Vitals:    08/08/23 0741 08/08/23 0805 08/08/23 1925 08/09/23 0759   BP: 121/71 (!) 119/57 (!) 113/58 113/70   BP Location:  Right arm Left arm Left arm   Patient Position:  Lying Sitting Sitting   Pulse: 78 63 90 75   Resp: 20 20 18 18   Temp: 98.4 °F (36.9 °C) 97.7 °F (36.5 °C) 98.4 °F (36.9 °C) 98.1 °F (36.7 °C)   TempSrc:  Oral Oral Oral   SpO2: 97% 96% 97% 95%   Weight:       Height:           Body mass index is 37.67 kg/m².    CONSTITUTIONAL  General Appearance: unremarkable, age appropriate, overweight    MUSCULOSKELETAL  Muscle Strength and Tone:no tremor, no tic  Abnormal Involuntary Movements: No  Gait and Station: non-ataxic    PSYCHIATRIC   Level of Consciousness: awake, alert , and oriented  Orientation: person, place, time, and situation  Grooming: Casually dressed and Disheveled  Psychomotor Behavior: normal, cooperative  Speech: normal tone, normal pitch, pressured  Language: grossly intact  Mood: fine  Affect: Consistent with mood and Congruent with thought  Thought Process: circumstantial and flight of ideas  Associations: circumstantial   Thought Content: DENIES suicidal ideation, DENIES homicidal ideation, and no delusions  Perceptions: responding to internal stimuli  Memory: Able to recall past events, Remote intact, and Recent intact  Attention:Impaired to some degree  Fund of Knowledge: Impaired  Estimate if Intelligence:  Below average based on work/education history, vocabulary and mental status exam  Insight: limited awareness of illness  Judgment: behavior is adequate to circumstances    DIAGNOSTIC TESTING   Laboratory Results  No results found for this or any previous visit (from the past 24 hour(s)).    MEDICAL DECISION MAKING       ASSESSMENT:   Schizophrneia, chronic with acute exacerbation  MDD, recurrent, severe with psychotic features  Unspecified Anxiety Disorder      Psychosocial stressors     Nicotine dependence  Polysubstance abuse        PROBLEM LIST AND MANAGEMENT PLANS     Psychosis: pt counseled  -ContinueGeodon 20 mg po BID     Depression: pt counseled  -Resume wellbutrin 150 mg/day starting tomorrow     Anxiety: pt counseled  -start vistaril prn     Psychosocial stressors: pt counseled  -SW consulted to assist with resources     Nicotine dependence: pt counseled  -start Nicotine 14 mg patch dermal q day     Polysubstance abuse: pt counseled  -rehab if willing  -Wellbutrin off-label for nicotine dependence  -Consider trial of naltrexone for ETOH cravings/off-label for gambling use disorder-pending further evaluation    Discussed diagnosis, risks and benefits of proposed treatment vs alternative treatments vs no treatment, potential side effects of these treatments and the inherent unpredictability of treatment. The patient expresses understanding of the above and displays the capacity to agree with this treatment given said understanding. Patient also agrees that, currently, the benefits outweigh the risks and would like to pursue/continue treatment at this time.    DISCHARGE PLANNING  Expected Disposition Plan: rehab    NEED FOR CONTINUED HOSPITALIZATION  Psychiatric illness continues to pose a potential threat to life or bodily function, of self or others, thereby requiring the need for continued inpatient psychiatric hospitalization: Yes, due to: significant psychotic thought disorder, as evidenced by:  Ongoing concerns with grave disability with patient unable to perform basic feeding, hygiene and dressing activities without significant constant support.    Protective inpatient pyschiatric hospitalization required while a safe disposition plan is enacted: Yes    Patient stabilized and ready for discharge from inpatient psychiatric unit: No      STAFF:   Jarad Whalen NP  Psychiatry

## 2023-08-10 PROCEDURE — 25000003 PHARM REV CODE 250: Performed by: INTERNAL MEDICINE

## 2023-08-10 PROCEDURE — 99232 SBSQ HOSP IP/OBS MODERATE 35: CPT | Mod: ,,, | Performed by: PSYCHIATRY & NEUROLOGY

## 2023-08-10 PROCEDURE — 25000003 PHARM REV CODE 250: Performed by: PSYCHIATRY & NEUROLOGY

## 2023-08-10 PROCEDURE — 90833 PR PSYCHOTHERAPY W/PATIENT W/E&M, 30 MIN (ADD ON): ICD-10-PCS | Mod: ,,, | Performed by: PSYCHIATRY & NEUROLOGY

## 2023-08-10 PROCEDURE — 99232 PR SUBSEQUENT HOSPITAL CARE,LEVL II: ICD-10-PCS | Mod: ,,, | Performed by: PSYCHIATRY & NEUROLOGY

## 2023-08-10 PROCEDURE — 90833 PSYTX W PT W E/M 30 MIN: CPT | Mod: ,,, | Performed by: PSYCHIATRY & NEUROLOGY

## 2023-08-10 PROCEDURE — 11400000 HC PSYCH PRIVATE ROOM

## 2023-08-10 RX ORDER — ZIPRASIDONE HYDROCHLORIDE 20 MG/1
40 CAPSULE ORAL 2 TIMES DAILY WITH MEALS
Status: DISCONTINUED | OUTPATIENT
Start: 2023-08-10 | End: 2023-08-15 | Stop reason: HOSPADM

## 2023-08-10 RX ADMIN — BUPROPION HYDROCHLORIDE 150 MG: 150 TABLET, FILM COATED, EXTENDED RELEASE ORAL at 08:08

## 2023-08-10 RX ADMIN — ZIPRASIDONE HYDROCHLORIDE 40 MG: 20 CAPSULE ORAL at 05:08

## 2023-08-10 RX ADMIN — ZIPRASIDONE HYDROCHLORIDE 20 MG: 20 CAPSULE ORAL at 07:08

## 2023-08-10 NOTE — PLAN OF CARE
POC reviewed this shift and is on going. Patient is calm, cooperative, quiet, and anxious. Denies Suicidal Ideation, Homicidal Ideation, Auditory Hallucinations, Visual Hallucinations, Tactile Hallucinations, Gustatory Hallucinations, and Delusions. Patient out on the floor most of the day watching TV and communicating with his peers and staff. Patient participated in the group session that was conducted today. Dr Alvarado met with the Patient and increased his geodon to 40mg BID. Pt continues to be medication compliant and staff will continue to monitor pt closely while on the unit.

## 2023-08-10 NOTE — PLAN OF CARE
POC reviewed this shift and is ongoing.  Pt withdrawn to his room and comes out as needed. Pt cooperative with staff. Denies SI, HI. No ss of distress.

## 2023-08-10 NOTE — PLAN OF CARE
08/10/23 1724   Step 1: Warning Signs   Warning Sign 1 no money   Warning Sign 2 no cell phone   Warning Sign 3 no food   Step 2: Internal coping strategies - Things I can do to take my mind off my problems without contacting another person:   Coping Strategy 1 pray   Coping Strategy 2 read   Coping Strategy 3 hot bubble   Step 3: People and social settings that provide distraction:   1. Name father Navarro       Phone [phone]   2. Name n/a   3. Place movies   4. Place zoo    Step 4: People whom I can ask for help:   1. Person n/a   2. Person n/a   3. Person n/a   Step 5: Professionals or agencies I can contact during a crisis:   1. Clinician Name [facility]       Phone [phone]   3. Suicide Prevention Lifeline: 988 Suicide Prevention Lifeline 988   4. [facility] Emergency Service       911       Emergency Services Phone Warm Line [phone] Wed-Sun 5pm-10pm   Step 6: Making the environment safer (plan for lethal means safety)   Safe Environment Plan call crisis line   Safe Environment Optional: What is most important to me and worth living for? God and my future family   Safety Plan Tasks   Provided a Hard Copy to the Patient Y   Explained How to Follow the Steps Y   Discussed Facilitators and Barriers Y

## 2023-08-11 PROBLEM — R13.10 DYSPHAGIA: Status: ACTIVE | Noted: 2023-08-11

## 2023-08-11 PROCEDURE — 99232 PR SUBSEQUENT HOSPITAL CARE,LEVL II: ICD-10-PCS | Mod: ,,, | Performed by: PSYCHIATRY & NEUROLOGY

## 2023-08-11 PROCEDURE — 90833 PR PSYCHOTHERAPY W/PATIENT W/E&M, 30 MIN (ADD ON): ICD-10-PCS | Mod: ,,, | Performed by: PSYCHIATRY & NEUROLOGY

## 2023-08-11 PROCEDURE — A9698 NON-RAD CONTRAST MATERIALNOC: HCPCS | Performed by: PSYCHIATRY & NEUROLOGY

## 2023-08-11 PROCEDURE — 99232 SBSQ HOSP IP/OBS MODERATE 35: CPT | Mod: ,,, | Performed by: PSYCHIATRY & NEUROLOGY

## 2023-08-11 PROCEDURE — 90833 PSYTX W PT W E/M 30 MIN: CPT | Mod: ,,, | Performed by: PSYCHIATRY & NEUROLOGY

## 2023-08-11 PROCEDURE — 25000003 PHARM REV CODE 250: Performed by: INTERNAL MEDICINE

## 2023-08-11 PROCEDURE — 25500020 PHARM REV CODE 255: Performed by: PSYCHIATRY & NEUROLOGY

## 2023-08-11 PROCEDURE — 11400000 HC PSYCH PRIVATE ROOM

## 2023-08-11 PROCEDURE — 25000003 PHARM REV CODE 250: Performed by: PSYCHIATRY & NEUROLOGY

## 2023-08-11 RX ORDER — DOCUSATE SODIUM 100 MG/1
100 CAPSULE, LIQUID FILLED ORAL DAILY PRN
Status: DISCONTINUED | OUTPATIENT
Start: 2023-08-11 | End: 2023-08-15 | Stop reason: HOSPADM

## 2023-08-11 RX ORDER — DIPHENHYDRAMINE HCL 50 MG
50 CAPSULE ORAL ONCE
Status: COMPLETED | OUTPATIENT
Start: 2023-08-11 | End: 2023-08-11

## 2023-08-11 RX ORDER — DIPHENHYDRAMINE HCL 50 MG
50 CAPSULE ORAL EVERY 6 HOURS PRN
Status: DISCONTINUED | OUTPATIENT
Start: 2023-08-11 | End: 2023-08-15 | Stop reason: HOSPADM

## 2023-08-11 RX ADMIN — ZIPRASIDONE HYDROCHLORIDE 40 MG: 20 CAPSULE ORAL at 07:08

## 2023-08-11 RX ADMIN — ZIPRASIDONE HYDROCHLORIDE 40 MG: 20 CAPSULE ORAL at 05:08

## 2023-08-11 RX ADMIN — BARIUM SULFATE 135 ML: 980 POWDER, FOR SUSPENSION ORAL at 09:08

## 2023-08-11 RX ADMIN — DIPHENHYDRAMINE HYDROCHLORIDE 50 MG: 50 CAPSULE ORAL at 10:08

## 2023-08-11 RX ADMIN — BARIUM SULFATE 176 G: 960 POWDER, FOR SUSPENSION ORAL at 09:08

## 2023-08-11 RX ADMIN — BUPROPION HYDROCHLORIDE 150 MG: 150 TABLET, FILM COATED, EXTENDED RELEASE ORAL at 09:08

## 2023-08-11 RX ADMIN — DOCUSATE SODIUM 100 MG: 100 CAPSULE, LIQUID FILLED ORAL at 05:08

## 2023-08-11 NOTE — PLAN OF CARE
Collateral:   Russell Godoy, father, [phone]    Collateral Perception of Problem:   Not sure, Been in 3 different hospital in the last 3 months, 2 weeks apart, no hospitalizations within the 90 days he has came out stable     Previous Psych History/Hospitalizations:  Several     Suicide Attempts (how/severity):  None that Im aware of     History of violence:   None     Drug Use:  Smokes marijuana occasionally   Smokes a lot of black and mild and a lot of cigarettes   Has gambling addiction     Alcohol Use:  Has started to drink     Legal Issues:  Traffic violations      Other Pertinent Info:   Has schizophrenic disorder   Has AH   Invega shot   Has been on Geodon but doesn't think its working   Has been having involuntary jerks   Been having trouble sleeping, tend to go days without sleeping   Does have a home, lives alone   Has been in a psychosis a lot lately   Provides all his basic needs (food shelter utilities)   Says he's homeless and not   He likes to wander     Baseline:  Easygoing, has AH, has bad habits with no coping skills, doesn't know how to deal with his schizophrenia     Discharge Plan:  Believes he needs long term care, CBT Therapy

## 2023-08-11 NOTE — ASSESSMENT & PLAN NOTE
Patient swallowed a cigarette but over a year ago.  He may have some underlying esophageal irritation but doubt that he still has foreign body in his esophagus.  He is still able to tolerate p.o..  There may be some component psychogenic globus.  Recommend ENT eval in the outpatient setting to evaluate his upper esophagus.  If the patient remains symptomatic we will consider esophagram

## 2023-08-11 NOTE — NURSING
Wheeled down via w/c in stable condition with security at side. Calm and cooperative. No distress noted

## 2023-08-11 NOTE — PLAN OF CARE
POC reviewed this shift with patient and is on-going. Voiced understanding. No questions at present. Patient calm and cooperative. Denies SI/HI. Mainly out of room for meals. Ambulating in hallways at times. Tolerating PO. Esophogram completed today without adverse reaction. Reports swallowed a cigarette, unable to verify how long ago. Requesting stool softener today. Administered Docusate. Educated. Voiced understanding.  Compliant with medication.

## 2023-08-11 NOTE — SUBJECTIVE & OBJECTIVE
Past Medical History:   Diagnosis Date    Anxiety     Depression     History of psychiatric hospitalization 09/12/2019    [facility] 12/29/2022,05/29/2022,03/15/2021, 07/25/2019and [facility] 12/18/2020,09/12/2019.    Schizophrenia, unspecified     Suicide attempt        Past Surgical History:   Procedure Laterality Date    APPENDECTOMY         Review of patient's allergies indicates:   Allergen Reactions    Amoxicillin        No current facility-administered medications on file prior to encounter.     Current Outpatient Medications on File Prior to Encounter   Medication Sig    ziprasidone (GEODON) 20 MG Cap Take 20 mg by mouth 2 (two) times daily.     Family History       Problem Relation (Age of Onset)    Alcohol abuse Paternal Uncle    Depression Paternal Grandmother    Diabetes Maternal Grandmother, Paternal Grandmother    Drug abuse Paternal Uncle    Mental illness Brother    Paranoid behavior Maternal Uncle    Physical abuse Cousin    Schizophrenia Maternal Uncle    Seizures Mother          Tobacco Use    Smoking status: Every Day     Current packs/day: 1.00     Average packs/day: 1 pack/day for 2.0 years (2.0 ttl pk-yrs)     Types: Cigarettes    Smokeless tobacco: Never    Tobacco comments:     Pt stated that he smokes 1 pack of tobacco daily.   Substance and Sexual Activity    Alcohol use: Not Currently     Comment: socially, occ    Drug use: Yes     Types: Marijuana    Sexual activity: Yes     Partners: Male     Review of Systems   Constitutional:  Negative for fatigue and fever.   HENT:  Positive for trouble swallowing. Negative for congestion, ear pain and sore throat.    Eyes:  Negative for pain and discharge.   Respiratory:  Negative for cough, shortness of breath and wheezing.    Gastrointestinal:  Negative for abdominal pain, constipation, diarrhea, nausea and vomiting.   Endocrine: Negative for cold intolerance and heat intolerance.   Genitourinary:  Negative for difficulty urinating, dysuria and frequency.    Musculoskeletal:  Negative for arthralgias.   Allergic/Immunologic: Negative for environmental allergies.   Neurological:  Negative for dizziness, tremors and seizures.   Psychiatric/Behavioral:  Positive for behavioral problems, hallucinations and sleep disturbance.    All other systems reviewed and are negative.    Objective:     Vital Signs (Most Recent):  Temp: 97.8 °F (36.6 °C) (08/11/23 0800)  Pulse: 75 (08/11/23 0800)  Resp: 18 (08/11/23 0800)  BP: 125/60 (08/11/23 0800)  SpO2: 98 % (08/11/23 0800) Vital Signs (24h Range):  Temp:  [97.8 °F (36.6 °C)-98.6 °F (37 °C)] 97.8 °F (36.6 °C)  Pulse:  [65-75] 75  Resp:  [18-20] 18  SpO2:  [97 %-98 %] 98 %  BP: (125)/(60-80) 125/60     Weight: 122.5 kg (270 lb 1 oz)  Body mass index is 37.67 kg/m².     Physical Exam  Vitals and nursing note reviewed.   Constitutional:       Appearance: Normal appearance.   HENT:      Head: Normocephalic and atraumatic.      Nose: Nose normal. No congestion or rhinorrhea.      Mouth/Throat:      Mouth: Mucous membranes are moist.      Pharynx: Oropharynx is clear. No oropharyngeal exudate or posterior oropharyngeal erythema.   Eyes:      Extraocular Movements: Extraocular movements intact.      Conjunctiva/sclera: Conjunctivae normal.      Pupils: Pupils are equal, round, and reactive to light.   Cardiovascular:      Rate and Rhythm: Normal rate and regular rhythm.      Pulses: Normal pulses.      Heart sounds: Normal heart sounds.   Pulmonary:      Effort: Pulmonary effort is normal.      Breath sounds: Normal breath sounds.   Abdominal:      General: Abdomen is flat. Bowel sounds are normal.      Palpations: Abdomen is soft.   Musculoskeletal:         General: Normal range of motion.      Cervical back: Normal range of motion and neck supple.   Skin:     General: Skin is warm and dry.      Capillary Refill: Capillary refill takes less than 2 seconds.      Comments: No rashes on limited skin exam.   Neurological:      General: No  focal deficit present.      Mental Status: He is alert and oriented to person, place, and time.      Cranial Nerves: No cranial nerve deficit.      Comments: I Olfactory:  Sense of smell intact    II Optic:  Pupils equal round react to light.  Vision intact.    III, IV, VI, Ocular motor, Trochlear, Abducens:  Extraocular movements intact    V Trigeminal:  Facial sensation intact facial sensation intact,, muscles of mastication intact muscles of mastication intact, corneal reflex intact, corneal reflex intact    VII Facial:  Muscles of facial expression intact     VIII Vestibular cochlear: Hearing intact vestibular cochlear: Hearing intact    IX Glossopharyngeal:  Gag reflex intact.  Tasting intact.     X Vagus:  Gag reflex intact.    XI Spinal Accessory:  Shoulder shrug intact.  Head rotation intact.    XII Hypoglossal:  Tongue movements intact.     Psychiatric:      Comments: Patient appears depressed.  Tangential               CRANIAL NERVES     CN III, IV, VI   Pupils are equal, round, and reactive to light.       Significant Labs: All pertinent labs within the past 24 hours have been reviewed.    Significant Imaging: I have reviewed all pertinent imaging results/findings within the past 24 hours.

## 2023-08-11 NOTE — PLAN OF CARE
Problem: Adult Behavioral Health Plan of Care  Goal: Optimized Coping Skills in Response to Life Stressors  Intervention: Promote Effective Coping Strategies  Flowsheets (Taken 8/10/2023 1300)  Supportive Measures:   active listening utilized   problem-solving facilitated   relaxation techniques promoted   verbalization of feelings encouraged   self-reflection promoted     Behavior:  Pt was responding to internal stimuli       Intervention: In this CBT-focused group CSW facilitated group discussion on coping skills. Each patient was asked to identify a distraction, emotional release, self love and way of accessing  higher to break from mental and emotional distress.        Response:   pt identified goals as drawing, exercising, saltwater, meditation, and hot bubble baths to break from mental and emotional distress.           Plan: Staff will Continue to encourage pt to participate in process groups to verbalize feelings and develop healthy coping skills.

## 2023-08-11 NOTE — HOSPITAL COURSE
Consulted for dysphagia.  Patient has swallowed a cigarette but over a year ago and states that he is having some dysphagia and feels though something his throat.  He is still able to tolerate p.o. intake without issue.

## 2023-08-11 NOTE — PROGRESS NOTES
"PSYCHIATRY DAILY INPATIENT PROGRESS NOTE  SUBSEQUENT HOSPITAL VISIT    ENCOUNTER DATE: 8/11/2023  SITE: [facility]    DATE OF ADMISSION: 8/7/2023 11:25 PM  LENGTH OF STAY: 4 days    CHIEF COMPLAINT   Price Godoy is a 28 y.o. male, seen during daily chamberlain rounds on the inpatient unit.  Price Godoy presented with the chief complaint of bizarre behavior, suicidal ideation- pt would not answer     The patient was seen and examined. The chart was reviewed.     Reviewed notes from Rns and MD and labs from the last 24 hours.    The patient's case was discussed with the treatment team/care providers today including Rns and MD    Staff reports no behavioral or management issues.     The patient has been compliant with treatment.    Subjective 08/11/2023    Patient reports mood is "good," affect is appropriate, somewhat blunted. Rate and content of speech are more appropriate compared to previous assessments. Today patient states that he plans to return to his father's home after discharge and that he no longer feels the need to go to rehab.  He would like to go to [facility] for follow up after discharge.     There remains some concern for continuing psychotic symptoms-patient observed smiling inappropriately to self periodically during assessment-however this is less blatant compared to previous interactions. Pt continues to deny auditory/visual hallucinations.     Area of skin irritation, likely urticaria, noted to left upper thigh, approximately 2 in x 2in. Pt denies symptoms including itching, states that this has been present for "a while." Denies SOB, swallowing difficulty, or other s/s of anaphylaxis     Continues to deny SE from current medication regimen.     Psychiatric ROS (observed, reported, or endorsed/denied):  Depressed mood - fluctuating  Interest/pleasure/anhedonia: fluctuating  Guilt/hopelessness/worthlessness - No  Changes in Sleep - No  Changes in Appetite - No  Changes in Concentration - " No  Changes in Energy - No  PMA/R- No  Suicidal- active/passive ideations - fluctuating  Homicidal ideations: active/passive ideations - No    Hallucinations - fluctuating  Delusions - fluctuating  Disorganized behavior - No  Disorganized speech - Yes  Negative symptoms - Yes    Elevated mood - No  Decreased need for sleep - No  Grandiosity - No  Racing thoughts - No  Impulsivity - No  Irritability- No  Increased energy - No  Distractibility - Yes  Increase in goal-directed activity or PMA- No    Symptoms of MAGGIE - No  Symptoms of Panic Disorder- No  Symptoms of PTSD - No    Overall progress: Patient is showing mild improvement     Psychotherapy:  Target symptoms: depression, psychosis  Why chosen therapy is appropriate versus another modality: relevant to diagnosis, evidence based practice  Outcome monitoring methods: self-report, observation  Therapeutic intervention type: insight oriented psychotherapy, supportive psychotherapy  Topics discussed/themes: building skills sets for symptom management, symptom recognition  The patient's response to the intervention is accepting. The patient's progress toward treatment goals is fair.   Duration of intervention: 16 minutes.    Medical ROS  Review of Systems   Constitutional: Negative.    HENT: Negative.     Eyes: Negative.    Respiratory: Negative.     Cardiovascular: Negative.    Gastrointestinal: Negative.    Musculoskeletal: Negative.    Skin:  Positive for rash.   Neurological: Negative.    Endo/Heme/Allergies: Negative.    Psychiatric/Behavioral:  Positive for hallucinations.        PAST MEDICAL HISTORY   Past Medical History:   Diagnosis Date    Anxiety     Depression     History of psychiatric hospitalization 09/12/2019    [facility] 12/29/2022,05/29/2022,03/15/2021, 07/25/2019and [facility] 12/18/2020,09/12/2019.    Schizophrenia, unspecified     Suicide attempt        PSYCHOTROPIC MEDICATIONS   Scheduled Meds:   buPROPion  150 mg Oral Daily    ziprasidone  40 mg Oral BID WM      Continuous Infusions:  PRN Meds:.acetaminophen, aluminum-magnesium hydroxide-simethicone, benzonatate, benztropine mesylate, hydrOXYzine pamoate, loperamide, magnesium hydroxide 400 mg/5 ml, nicotine, OLANZapine **AND** OLANZapine, ondansetron, promethazine    EXAMINATION    VITALS   Vitals:    08/09/23 1919 08/10/23 0724 08/10/23 1900 08/11/23 0800   BP: 126/69 121/70 125/80 125/60   BP Location: Left arm Left arm Left arm Right arm   Patient Position: Sitting Sitting Sitting Sitting   Pulse: 70 65 65 75   Resp: 16 20 20 18   Temp: 98.9 °F (37.2 °C) 98.2 °F (36.8 °C) 98.6 °F (37 °C) 97.8 °F (36.6 °C)   TempSrc: Oral Oral Oral Oral   SpO2: 97% 95% 97% 98%   Weight:       Height:           Body mass index is 37.67 kg/m².    CONSTITUTIONAL  General Appearance: unremarkable, age appropriate, overweight    MUSCULOSKELETAL  Muscle Strength and Tone:no tremor, no tic  Abnormal Involuntary Movements: No  Gait and Station: non-ataxic    PSYCHIATRIC   Level of Consciousness: awake, alert , and oriented  Orientation: person, place, time, and situation  Grooming: Casually dressed and Disheveled  Psychomotor Behavior: normal, cooperative  Speech: normal tone, normal pitch, pressured  Language: grossly intact  Mood: fine  Affect: Blunted  Thought Process: circumstantial and flight of ideas  Associations: circumstantial   Thought Content: DENIES suicidal ideation, DENIES homicidal ideation, and no delusions  Perceptions: responding to internal stimuli-Improving somewhat  Memory: Able to recall past events, Remote intact, and Recent intact  Attention:Impaired to some degree  Fund of Knowledge: Impaired  Estimate if Intelligence:  Below average based on work/education history, vocabulary and mental status exam  Insight: limited awareness of illness  Judgment: behavior is adequate to circumstances    DIAGNOSTIC TESTING   Laboratory Results  No results found for this or any previous visit (from the past 24 hour(s)).    MEDICAL  DECISION MAKING       ASSESSMENT:   Schizophrneia, chronic with acute exacerbation  MDD, recurrent, severe with psychotic features  Unspecified Anxiety Disorder     Psychosocial stressors     Nicotine dependence  Polysubstance abuse  Foreign body sensation in throat        PROBLEM LIST AND MANAGEMENT PLANS     Psychosis: pt counseled  -ContinueGeodon 20 mg po BID-Increase to 40 mg BID starting this evening-Continue current dose     Depression: pt counseled  -Continue wellbutrin 150 mg/day      Anxiety: pt counseled  -start vistaril prn     Psychosocial stressors: pt counseled  -SW consulted to assist with resources     Nicotine dependence: pt counseled  -start Nicotine 14 mg patch dermal q day     Polysubstance abuse: pt counseled  -rehab if willing  -Wellbutrin off-label for nicotine dependence  -Consider trial of naltrexone for ETOH cravings/off-label for gambling use disorder-pending further evaluation    Foreign body sensation in throat:  -Patient breathing and swallowing without difficulty.   -Hospital medicine consulted  -Recommend outpatient ENT follow-up  -No further recommendations at this time.     Discussed diagnosis, risks and benefits of proposed treatment vs alternative treatments vs no treatment, potential side effects of these treatments and the inherent unpredictability of treatment. The patient expresses understanding of the above and displays the capacity to agree with this treatment given said understanding. Patient also agrees that, currently, the benefits outweigh the risks and would like to pursue/continue treatment at this time.    DISCHARGE PLANNING  Expected Disposition Plan: rehab    NEED FOR CONTINUED HOSPITALIZATION  Psychiatric illness continues to pose a potential threat to life or bodily function, of self or others, thereby requiring the need for continued inpatient psychiatric hospitalization: Yes, due to: significant psychotic thought disorder, as evidenced by:  Ongoing concerns  with grave disability with patient unable to perform basic feeding, hygiene and dressing activities without significant constant support.    Protective inpatient pyschiatric hospitalization required while a safe disposition plan is enacted: Yes    Patient stabilized and ready for discharge from inpatient psychiatric unit: No      STAFF:   Jarad Whalen NP  Psychiatry

## 2023-08-11 NOTE — CONSULTS
"[facility] ([facility]) Hospital Medicine  Consult Note    Patient Name: Price Godoy  MRN: [id number]  Admission Date: 8/7/2023  Hospital Length of Stay: 4 days  Attending Physician: Ravin Alvarado MD   Primary Care Provider: Leroy Howard III, MD           Patient information was obtained from ER records.     Consults  Subjective:     Principal Problem: Schizoaffective disorder, depressive type    Chief Complaint: No chief complaint on file.       HPI:   Chief Complaint   Patient presents with    Psychiatric Evaluation       Brought in by crises unit (Bharathi). Reports +visual and auditory hallucination since running out of meds. Pt unsure if took meds today. Denies SI. +HI pt states " I feel like the globe is out to get me" Pt staring in the aleyda.       Time seen by provider: 5:03 PM     This is a 28 y.o. male with a PMHx of schizoaffective disorder who presents via Crisis Unit with complaint of auditory and visual hallucinations. He states that he is currently on medication to combat this, but is short on them at this time. He is unable to make out what the voices say, but notes that the visual hallucinations show ar "things that are not true". He denies any suicidal or homicidal ideations. He also states that he "believes everyone is out to get him". The patient requests either rehab or a long stay at a hospital. He admits to a history of smoking and thinks he needs rehab for this. Patient denies any other complaints at this time.     The history is provided by the patient.          Past Medical History:   Diagnosis Date    Anxiety     Depression     History of psychiatric hospitalization 09/12/2019    [facility] 12/29/2022,05/29/2022,03/15/2021, 07/25/2019and [facility] 12/18/2020,09/12/2019.    Schizophrenia, unspecified     Suicide attempt        Past Surgical History:   Procedure Laterality Date    APPENDECTOMY         Review of patient's allergies indicates:   Allergen Reactions    " Amoxicillin        No current facility-administered medications on file prior to encounter.     Current Outpatient Medications on File Prior to Encounter   Medication Sig    ziprasidone (GEODON) 20 MG Cap Take 20 mg by mouth 2 (two) times daily.     Family History       Problem Relation (Age of Onset)    Alcohol abuse Paternal Uncle    Depression Paternal Grandmother    Diabetes Maternal Grandmother, Paternal Grandmother    Drug abuse Paternal Uncle    Mental illness Brother    Paranoid behavior Maternal Uncle    Physical abuse Cousin    Schizophrenia Maternal Uncle    Seizures Mother          Tobacco Use    Smoking status: Every Day     Current packs/day: 1.00     Average packs/day: 1 pack/day for 2.0 years (2.0 ttl pk-yrs)     Types: Cigarettes    Smokeless tobacco: Never    Tobacco comments:     Pt stated that he smokes 1 pack of tobacco daily.   Substance and Sexual Activity    Alcohol use: Not Currently     Comment: socially, occ    Drug use: Yes     Types: Marijuana    Sexual activity: Yes     Partners: Male     Review of Systems   Constitutional:  Negative for fatigue and fever.   HENT:  Positive for trouble swallowing. Negative for congestion, ear pain and sore throat.    Eyes:  Negative for pain and discharge.   Respiratory:  Negative for cough, shortness of breath and wheezing.    Gastrointestinal:  Negative for abdominal pain, constipation, diarrhea, nausea and vomiting.   Endocrine: Negative for cold intolerance and heat intolerance.   Genitourinary:  Negative for difficulty urinating, dysuria and frequency.   Musculoskeletal:  Negative for arthralgias.   Allergic/Immunologic: Negative for environmental allergies.   Neurological:  Negative for dizziness, tremors and seizures.   Psychiatric/Behavioral:  Positive for behavioral problems, hallucinations and sleep disturbance.    All other systems reviewed and are negative.    Objective:     Vital Signs (Most Recent):  Temp: 97.8 °F (36.6 °C)  (08/11/23 0800)  Pulse: 75 (08/11/23 0800)  Resp: 18 (08/11/23 0800)  BP: 125/60 (08/11/23 0800)  SpO2: 98 % (08/11/23 0800) Vital Signs (24h Range):  Temp:  [97.8 °F (36.6 °C)-98.6 °F (37 °C)] 97.8 °F (36.6 °C)  Pulse:  [65-75] 75  Resp:  [18-20] 18  SpO2:  [97 %-98 %] 98 %  BP: (125)/(60-80) 125/60     Weight: 122.5 kg (270 lb 1 oz)  Body mass index is 37.67 kg/m².     Physical Exam  Vitals and nursing note reviewed.   Constitutional:       Appearance: Normal appearance.   HENT:      Head: Normocephalic and atraumatic.      Nose: Nose normal. No congestion or rhinorrhea.      Mouth/Throat:      Mouth: Mucous membranes are moist.      Pharynx: Oropharynx is clear. No oropharyngeal exudate or posterior oropharyngeal erythema.   Eyes:      Extraocular Movements: Extraocular movements intact.      Conjunctiva/sclera: Conjunctivae normal.      Pupils: Pupils are equal, round, and reactive to light.   Cardiovascular:      Rate and Rhythm: Normal rate and regular rhythm.      Pulses: Normal pulses.      Heart sounds: Normal heart sounds.   Pulmonary:      Effort: Pulmonary effort is normal.      Breath sounds: Normal breath sounds.   Abdominal:      General: Abdomen is flat. Bowel sounds are normal.      Palpations: Abdomen is soft.   Musculoskeletal:         General: Normal range of motion.      Cervical back: Normal range of motion and neck supple.   Skin:     General: Skin is warm and dry.      Capillary Refill: Capillary refill takes less than 2 seconds.      Comments: No rashes on limited skin exam.   Neurological:      General: No focal deficit present.      Mental Status: He is alert and oriented to person, place, and time.      Cranial Nerves: No cranial nerve deficit.      Comments: I Olfactory:  Sense of smell intact    II Optic:  Pupils equal round react to light.  Vision intact.    III, IV, VI, Ocular motor, Trochlear, Abducens:  Extraocular movements intact    V Trigeminal:  Facial sensation intact facial  sensation intact,, muscles of mastication intact muscles of mastication intact, corneal reflex intact, corneal reflex intact    VII Facial:  Muscles of facial expression intact     VIII Vestibular cochlear: Hearing intact vestibular cochlear: Hearing intact    IX Glossopharyngeal:  Gag reflex intact.  Tasting intact.     X Vagus:  Gag reflex intact.    XI Spinal Accessory:  Shoulder shrug intact.  Head rotation intact.    XII Hypoglossal:  Tongue movements intact.     Psychiatric:      Comments: Patient appears depressed.  Tangential               CRANIAL NERVES     CN III, IV, VI   Pupils are equal, round, and reactive to light.       Significant Labs: All pertinent labs within the past 24 hours have been reviewed.    Significant Imaging: I have reviewed all pertinent imaging results/findings within the past 24 hours.    Assessment/Plan:     * Schizoaffective disorder, depressive type  To be admitted to our inpatient psychiatric unit for further evaluation and management.        Dysphagia  Patient swallowed a cigarette but over a year ago.  He may have some underlying esophageal irritation but doubt that he still has foreign body in his esophagus.  He is still able to tolerate p.o..  There may be some component psychogenic globus.  Recommend ENT eval in the outpatient setting to evaluate his upper esophagus.  If the patient remains symptomatic we will consider esophagram      Mild tetrahydrocannabinol (THC) abuse  Cessation advised.        VTE Risk Mitigation (From admission, onward)    None              Thank you for your consult. I will sign off. Please contact us if you have any additional questions.    Jeffery Rader Jr, MD  Department of Hospital Medicine   [facility] ([facility])

## 2023-08-12 PROCEDURE — 25000003 PHARM REV CODE 250: Performed by: PSYCHIATRY & NEUROLOGY

## 2023-08-12 PROCEDURE — 99232 PR SUBSEQUENT HOSPITAL CARE,LEVL II: ICD-10-PCS | Mod: ,,, | Performed by: PSYCHIATRY & NEUROLOGY

## 2023-08-12 PROCEDURE — 99232 SBSQ HOSP IP/OBS MODERATE 35: CPT | Mod: ,,, | Performed by: PSYCHIATRY & NEUROLOGY

## 2023-08-12 PROCEDURE — 90833 PSYTX W PT W E/M 30 MIN: CPT | Mod: ,,, | Performed by: PSYCHIATRY & NEUROLOGY

## 2023-08-12 PROCEDURE — 11400000 HC PSYCH PRIVATE ROOM

## 2023-08-12 PROCEDURE — 90833 PR PSYCHOTHERAPY W/PATIENT W/E&M, 30 MIN (ADD ON): ICD-10-PCS | Mod: ,,, | Performed by: PSYCHIATRY & NEUROLOGY

## 2023-08-12 RX ORDER — DIPHENHYDRAMINE HCL 50 MG
50 CAPSULE ORAL ONCE
Status: COMPLETED | OUTPATIENT
Start: 2023-08-12 | End: 2023-08-12

## 2023-08-12 RX ADMIN — ZIPRASIDONE HYDROCHLORIDE 40 MG: 20 CAPSULE ORAL at 09:08

## 2023-08-12 RX ADMIN — DOCUSATE SODIUM 100 MG: 100 CAPSULE, LIQUID FILLED ORAL at 11:08

## 2023-08-12 RX ADMIN — DIPHENHYDRAMINE HYDROCHLORIDE 50 MG: 50 CAPSULE ORAL at 08:08

## 2023-08-12 RX ADMIN — ZIPRASIDONE HYDROCHLORIDE 40 MG: 20 CAPSULE ORAL at 05:08

## 2023-08-12 RX ADMIN — DIPHENHYDRAMINE HYDROCHLORIDE 50 MG: 50 CAPSULE ORAL at 09:08

## 2023-08-12 RX ADMIN — DIPHENHYDRAMINE HYDROCHLORIDE 50 MG: 50 CAPSULE ORAL at 11:08

## 2023-08-12 NOTE — PROGRESS NOTES
Received portal message from nursing staff stating that patient is complaining of itching r/t urticaria on upper thigh. Patient was offered vistaril and declined. States now that he thinks symptoms started upon initiation of Wellbutrin. Will discontinue. Patient is amenable to PO benadryl. Staff to monitor.

## 2023-08-12 NOTE — PROGRESS NOTES
"PSYCHIATRY DAILY INPATIENT PROGRESS NOTE  SUBSEQUENT HOSPITAL VISIT    ENCOUNTER DATE: 8/12/2023  SITE: [facility]    DATE OF ADMISSION: 8/7/2023 11:25 PM  LENGTH OF STAY: 5 days    CHIEF COMPLAINT   Price Godoy is a 28 y.o. male, seen during daily chamberlain rounds on the inpatient unit.  Price Godoy presented with the chief complaint of bizarre behavior, suicidal ideation- pt would not answer     The patient was seen and examined. The chart was reviewed.     Reviewed notes from Rns, MD, NP, and SW and labs from the last 24 hours.    The patient's case was discussed with the treatment team/care providers today including Rns    Staff reports no behavioral or management issues.     The patient has been compliant with treatment.    Subjective 08/12/2023    Per yesterday's notes: Patient reports mood is "good," affect is appropriate, somewhat blunted. Rate and content of speech are more appropriate compared to previous assessments. Today patient states that he plans to return to his father's home after discharge and that he no longer feels the need to go to rehab.  He would like to go to [facility] for follow up after discharge.   There remains some concern for continuing psychotic symptoms-patient observed smiling inappropriately to self periodically during assessment-however this is less blatant compared to previous interactions. Pt continues to deny auditory/visual hallucinations.   Area of skin irritation, likely urticaria, noted to left upper thigh, approximately 2 in x 2in. Pt denies symptoms including itching, states that this has been present for "a while." Denies SOB, swallowing difficulty, or other s/s of anaphylaxis   Continues to deny SE from current medication regimen.     Today, he reports that he is ok, "I just came in to have my meds adjusted." He feels that he is doing somewhat better since admission.  -Mood symptoms continue to fluctuate  -Psychosis persist, although he is minimizing symptoms; " he is making steady progress.  -he is declining rehab placement    Itchiness has improved since yesterday.      Psychiatric ROS (observed, reported, or endorsed/denied):  Depressed mood - fluctuating  Interest/pleasure/anhedonia: fluctuating  Guilt/hopelessness/worthlessness - No  Changes in Sleep - No  Changes in Appetite - No  Changes in Concentration - No  Changes in Energy - No  PMA/R- No  Suicidal- active/passive ideations - fluctuating  Homicidal ideations: active/passive ideations - No    Hallucinations - fluctuating  Delusions - fluctuating  Disorganized behavior - No  Disorganized speech - Yes  Negative symptoms - Yes    Elevated mood - No  Decreased need for sleep - No  Grandiosity - No  Racing thoughts - No  Impulsivity - No  Irritability- No  Increased energy - No  Distractibility - Yes  Increase in goal-directed activity or PMA- No    Symptoms of MAGGIE - No  Symptoms of Panic Disorder- No  Symptoms of PTSD - No    Overall progress: Patient is showing moderate improvement    Psychotherapy:  Target symptoms: depression, psychosis  Why chosen therapy is appropriate versus another modality: relevant to diagnosis, evidence based practice  Outcome monitoring methods: self-report, observation  Therapeutic intervention type: insight oriented psychotherapy, supportive psychotherapy  Topics discussed/themes: building skills sets for symptom management, symptom recognition  The patient's response to the intervention is accepting. The patient's progress toward treatment goals is fair.   Duration of intervention: 16 minutes.    Medical ROS  Review of Systems   Constitutional: Negative.    HENT: Negative.     Eyes: Negative.    Respiratory: Negative.     Cardiovascular: Negative.    Gastrointestinal: Negative.    Musculoskeletal: Negative.    Skin:  Positive for rash.   Neurological: Negative.    Endo/Heme/Allergies: Negative.    Psychiatric/Behavioral:  Positive for hallucinations.        PAST MEDICAL HISTORY   Past  Medical History:   Diagnosis Date    Anxiety     Depression     History of psychiatric hospitalization 09/12/2019    [facility] 12/29/2022,05/29/2022,03/15/2021, 07/25/2019and [facility] 12/18/2020,09/12/2019.    Schizophrenia, unspecified     Suicide attempt        PSYCHOTROPIC MEDICATIONS   Scheduled Meds:   ziprasidone  40 mg Oral BID WM     Continuous Infusions:  PRN Meds:.acetaminophen, aluminum-magnesium hydroxide-simethicone, benzonatate, benztropine mesylate, diphenhydrAMINE, docusate sodium, hydrOXYzine pamoate, loperamide, magnesium hydroxide 400 mg/5 ml, nicotine, OLANZapine **AND** OLANZapine, ondansetron, promethazine    EXAMINATION    VITALS   Vitals:    08/10/23 1900 08/11/23 0800 08/11/23 1917 08/12/23 0819   BP: 125/80 125/60 129/72 112/68   BP Location: Left arm Right arm Left arm Left arm   Patient Position: Sitting Sitting Sitting Sitting   Pulse: 65 75 70 74   Resp: 20 18 18 20   Temp: 98.6 °F (37 °C) 97.8 °F (36.6 °C) 99 °F (37.2 °C) 98.6 °F (37 °C)   TempSrc: Oral Oral Oral Oral   SpO2: 97% 98% 99% 97%   Weight:       Height:           Body mass index is 37.67 kg/m².    CONSTITUTIONAL  General Appearance: unremarkable, age appropriate, overweight    MUSCULOSKELETAL  Muscle Strength and Tone:no tremor, no tic  Abnormal Involuntary Movements: No  Gait and Station: non-ataxic    PSYCHIATRIC   Level of Consciousness: awake, alert , and oriented  Orientation: person, place, time, and situation  Grooming: Casually dressed and less Disheveled  Psychomotor Behavior: normal, cooperative  Speech: normal tone, normal pitch, normal volume, spontaneous, rapid  Language: grossly intact  Mood: fine  Affect: Blunted  Thought Process: circumstantial and less flight of ideas  Associations: circumstantial   Thought Content: DENIES suicidal ideation, DENIES homicidal ideation, and no delusions  Perceptions: responding to internal stimuli-Improving somewhat  Memory: Able to recall past events, Remote intact, and Recent  intact  Attention:Impaired to some degree  Fund of Knowledge: Impaired  Estimate if Intelligence:  Below average based on work/education history, vocabulary and mental status exam  Insight: limited awareness of illness  Judgment: behavior is adequate to circumstances    DIAGNOSTIC TESTING   Laboratory Results  No results found for this or any previous visit (from the past 24 hour(s)).    MEDICAL DECISION MAKING       ASSESSMENT:   Schizophrneia, chronic with acute exacerbation  MDD, recurrent, severe with psychotic features  Unspecified Anxiety Disorder     Psychosocial stressors     Nicotine dependence  Polysubstance abuse  Foreign body sensation in throat        PROBLEM LIST AND MANAGEMENT PLANS     Psychosis: pt counseled  -ContinueGeodon 20 mg po BID-Increase to 40 mg BID-Continue current dose     Depression: pt counseled  -Continue wellbutrin 150 mg/day- stopped (caused itchiness)     Anxiety: pt counseled  -started/continue vistaril prn     Psychosocial stressors: pt counseled  -SW consulted to assist with resources     Nicotine dependence: pt counseled  -started/continue Nicotine 14 mg patch dermal q day     Polysubstance abuse: pt counseled  -rehab if willing  -Wellbutrin off-label for nicotine dependence  -Consider trial of naltrexone for ETOH cravings/off-label for gambling use disorder-pending further evaluation    Foreign body sensation in throat:  -Patient breathing and swallowing without difficulty.   -Hospital medicine consulted  -Recommend outpatient ENT follow-up  -No further recommendations at this time.     Discussed diagnosis, risks and benefits of proposed treatment vs alternative treatments vs no treatment, potential side effects of these treatments and the inherent unpredictability of treatment. The patient expresses understanding of the above and displays the capacity to agree with this treatment given said understanding. Patient also agrees that, currently, the benefits outweigh the risks  and would like to pursue/continue treatment at this time.    DISCHARGE PLANNING  Expected Disposition Plan: rehab vs home    NEED FOR CONTINUED HOSPITALIZATION  Psychiatric illness continues to pose a potential threat to life or bodily function, of self or others, thereby requiring the need for continued inpatient psychiatric hospitalization: Yes, due to: significant psychotic thought disorder, as evidenced by:  Ongoing concerns with grave disability with patient unable to perform basic feeding, hygiene and dressing activities without significant constant support.    Protective inpatient pyschiatric hospitalization required while a safe disposition plan is enacted: Yes    Patient stabilized and ready for discharge from inpatient psychiatric unit: No      STAFF:   Ravin Alvarado NP  Psychiatry

## 2023-08-12 NOTE — NURSING
Pt to nurses's station w/c/o rash to bilateral upper extremities, trunk and thigh.  Pt states he previously discussed rash with NP.  NP notified via secure chat.  New orders noted.  Will continue to monitor.

## 2023-08-12 NOTE — PLAN OF CARE
POC reviewed this shift and is on going.  Pt is calm and cooperative on unit and compliant with medications.  Pt denies SI/HI/AVH at this time. Will continue to monitor closely while on the unit.

## 2023-08-13 PROCEDURE — 90833 PSYTX W PT W E/M 30 MIN: CPT | Mod: ,,, | Performed by: PSYCHIATRY & NEUROLOGY

## 2023-08-13 PROCEDURE — 25000003 PHARM REV CODE 250: Performed by: PSYCHIATRY & NEUROLOGY

## 2023-08-13 PROCEDURE — 99232 SBSQ HOSP IP/OBS MODERATE 35: CPT | Mod: ,,, | Performed by: PSYCHIATRY & NEUROLOGY

## 2023-08-13 PROCEDURE — 11400000 HC PSYCH PRIVATE ROOM

## 2023-08-13 PROCEDURE — 90833 PR PSYCHOTHERAPY W/PATIENT W/E&M, 30 MIN (ADD ON): ICD-10-PCS | Mod: ,,, | Performed by: PSYCHIATRY & NEUROLOGY

## 2023-08-13 PROCEDURE — 99232 PR SUBSEQUENT HOSPITAL CARE,LEVL II: ICD-10-PCS | Mod: ,,, | Performed by: PSYCHIATRY & NEUROLOGY

## 2023-08-13 PROCEDURE — 25000003 PHARM REV CODE 250: Performed by: INTERNAL MEDICINE

## 2023-08-13 RX ADMIN — ZIPRASIDONE HYDROCHLORIDE 40 MG: 20 CAPSULE ORAL at 08:08

## 2023-08-13 RX ADMIN — DOCUSATE SODIUM 100 MG: 100 CAPSULE, LIQUID FILLED ORAL at 09:08

## 2023-08-13 RX ADMIN — DIPHENHYDRAMINE HYDROCHLORIDE 50 MG: 50 CAPSULE ORAL at 05:08

## 2023-08-13 RX ADMIN — HYDROXYZINE PAMOATE 50 MG: 50 CAPSULE ORAL at 05:08

## 2023-08-13 RX ADMIN — ZIPRASIDONE HYDROCHLORIDE 40 MG: 20 CAPSULE ORAL at 04:08

## 2023-08-13 RX ADMIN — DIPHENHYDRAMINE HYDROCHLORIDE 50 MG: 50 CAPSULE ORAL at 09:08

## 2023-08-13 NOTE — NURSING
POC reviewed this shift and is on going.  Pt calm and cooperative on unit.  Pt denies SI/HI/AVH.  Pt complained of rash last night, given additional 1 time dose of benadryl per order given by JAZMIN Abraham.  Pt continues to be fixated on getting additional fiber.  Pt requested dietary consult to secure extra salads at 2145 last night.  Pt also expresses readiness for discharge and hopes to be discharged Monday.

## 2023-08-13 NOTE — PLAN OF CARE
Problem: Adult Behavioral Health Plan of Care  Goal: Plan of Care Review  Outcome: Ongoing, Progressing  Goal: Patient-Specific Goal (Individualization)  Outcome: Ongoing, Progressing  Goal: Adheres to Safety Considerations for Self and Others  Outcome: Ongoing, Progressing  Goal: Absence of New-Onset Illness or Injury  Outcome: Ongoing, Progressing  Goal: Optimized Coping Skills in Response to Life Stressors  Outcome: Ongoing, Progressing     Problem: Violence Risk or Actual  Goal: Anger and Impulse Control  Outcome: Ongoing, Progressing     Problem: Activity and Energy Impairment (Excessive Substance Use)  Goal: Optimized Energy Level (Excessive Substance Use)  Outcome: Ongoing, Progressing     Problem: Behavior Regulation Impairment (Excessive Substance Use)  Goal: Improved Behavioral Control (Excessive Substance Use)  Outcome: Ongoing, Progressing     Problem: Decreased Participation and Engagement (Excessive Substance Use)  Goal: Increased Participation and Engagement (Excessive Substance Use)  Outcome: Ongoing, Progressing     Problem: Physiologic Impairment (Excessive Substance Use)  Goal: Improved Physiologic Symptoms (Excessive Substance Use)  Outcome: Ongoing, Progressing     Problem: Social, Occupational or Functional Impairment (Excessive Substance Use)  Goal: Enhanced Social, Occupational or Functional Skills (Excessive Substance Use)  Outcome: Ongoing, Progressing     Problem: Activity and Energy Impairment (Depressive Signs/Symptoms)  Goal: Optimized Energy Level (Depressive Signs/Symptoms)  Outcome: Ongoing, Progressing     Problem: Cognitive Impairment (Depressive Signs/Symptoms)  Goal: Optimized Cognitive Function  Outcome: Ongoing, Progressing     Problem: Decreased Participation/Engagement (Depressive Signs/Symptoms)  Goal: Increased Participation and Engagement (Depressive Signs/Symptoms)  Outcome: Ongoing, Progressing     Problem: Feelings of Worthlessness, Hopelessness or Excessive Guilt  (Depressive Signs/Symptoms)  Goal: Enhanced Self-Esteem and Confidence (Depressive Signs/Symptoms)  Outcome: Ongoing, Progressing     Problem: Mood Impairment (Depressive Signs/Symptoms)  Goal: Improved Mood Symptoms (Depressive Signs/Symptoms)  Outcome: Ongoing, Progressing     Problem: Nutrition Imbalance (Depressive Signs/Symptoms)  Goal: Optimized Nutrition Intake  Outcome: Ongoing, Progressing     Problem: Psychomotor Impairment (Depressive Signs/Symptoms)  Goal: Improved Psychomotor Symptoms (Depressive Signs/Symptoms)  Outcome: Ongoing, Progressing     Problem: Sleep Disturbance (Depressive Signs/Symptoms)  Goal: Improved Sleep (Depressive Signs/Symptoms)  Outcome: Ongoing, Progressing     Problem: Social, Occupational or Functional Impairment (Depressive Signs/Symptoms)  Goal: Enhanced Social, Occupational or Functional Skills (Depressive Signs/Symptoms)  Outcome: Ongoing, Progressing

## 2023-08-13 NOTE — NURSING
Pt in activity room watching tv at this time.  Pt has spent most of the evening in the common areas.  Pt continues to be anxious.  Pt states his depression and anxiety is decreased. Pt denies si, hi or a v hallucinations.  Gravely disabled.  Pt interacts appropriately with staff and peers when prompted. Pt taking meds with no side effects noted.  Appetite adequate. Drug seeking behavior at times.  Pt instructed to call for any needs or concerns at all.  Verbalized understanding.s  Will cont to monitor for safety.

## 2023-08-13 NOTE — NURSING
Since no topical anti-itch medication available.  New order for 1 time dose, 50 mg benadryl PO given per JAZMIN Abraham, order placed.

## 2023-08-13 NOTE — PROGRESS NOTES
"PSYCHIATRY DAILY INPATIENT PROGRESS NOTE  SUBSEQUENT HOSPITAL VISIT    ENCOUNTER DATE: 8/13/2023  SITE: [facility]    DATE OF ADMISSION: 8/7/2023 11:25 PM  LENGTH OF STAY: 6 days    CHIEF COMPLAINT   Price Godoy is a 28 y.o. male, seen during daily chamberlain rounds on the inpatient unit.  Price Godoy presented with the chief complaint of bizarre behavior, suicidal ideation- pt would not answer     The patient was seen and examined. The chart was reviewed.     Reviewed notes from Rns and LPN and labs from the last 24 hours.    The patient's case was discussed with the treatment team/care providers today including Rns    Staff reports no behavioral or management issues.     The patient has been compliant with treatment.    Subjective 08/13/2023    Today, he reports that he is ok, "I just came in to have my meds adjusted." He feels that he is doing somewhat better since admission.  -Mood symptoms continue to fluctuate- he notes less depression  -Psychosis persist, although he is minimizing symptoms; he is making steady progress. Some paranoia noted. He is approaching his baseline.   -he is declining rehab placement; he is minimizing his substance use     He had a rash yesterday, which he believes was triggered by the hospital detergent.It improved with benadryl/       Psychiatric ROS (observed, reported, or endorsed/denied):  Depressed mood - decreasing steadily  Interest/pleasure/anhedonia: decreasing steadily  Guilt/hopelessness/worthlessness - No  Changes in Sleep - No  Changes in Appetite - No  Changes in Concentration - No  Changes in Energy - No  PMA/R- No  Suicidal- active/passive ideations - improving steadily  Homicidal ideations: active/passive ideations - No    Hallucinations - decreasing steadily  Delusions - decreasing steadily  Disorganized behavior - No  Disorganized speech - decreasing steadily  Negative symptoms - decreasing steadily    Elevated mood - No  Decreased need for sleep - " No  Grandiosity - No  Racing thoughts - No  Impulsivity - No  Irritability- No  Increased energy - No  Distractibility - no  Increase in goal-directed activity or PMA- No    Symptoms of MAGGIE - No  Symptoms of Panic Disorder- No  Symptoms of PTSD - No    Overall progress: Patient is showing moderate improvement    Psychotherapy:  Target symptoms: depression, psychosis  Why chosen therapy is appropriate versus another modality: relevant to diagnosis, evidence based practice  Outcome monitoring methods: self-report, observation  Therapeutic intervention type: insight oriented psychotherapy, supportive psychotherapy  Topics discussed/themes: building skills sets for symptom management, symptom recognition  The patient's response to the intervention is accepting. The patient's progress toward treatment goals is fair.   Duration of intervention: 16 minutes.    Medical ROS  Review of Systems   Constitutional: Negative.    HENT: Negative.     Eyes: Negative.    Respiratory: Negative.     Cardiovascular: Negative.    Gastrointestinal: Negative.    Musculoskeletal: Negative.    Skin:  Positive for rash.   Neurological: Negative.    Endo/Heme/Allergies: Negative.    Psychiatric/Behavioral:  Positive for hallucinations.        PAST MEDICAL HISTORY   Past Medical History:   Diagnosis Date    Anxiety     Depression     History of psychiatric hospitalization 09/12/2019    [facility] 12/29/2022,05/29/2022,03/15/2021, 07/25/2019and [facility] 12/18/2020,09/12/2019.    Schizophrenia, unspecified     Suicide attempt        PSYCHOTROPIC MEDICATIONS   Scheduled Meds:   ziprasidone  40 mg Oral BID WM     Continuous Infusions:  PRN Meds:.acetaminophen, aluminum-magnesium hydroxide-simethicone, benzonatate, benztropine mesylate, diphenhydrAMINE, docusate sodium, hydrOXYzine pamoate, loperamide, magnesium hydroxide 400 mg/5 ml, nicotine, OLANZapine **AND** OLANZapine, ondansetron, promethazine    EXAMINATION    VITALS   Vitals:    08/11/23 1917 08/12/23  0819 08/12/23 1916 08/13/23 0733   BP: 129/72 112/68 119/73 118/68   BP Location: Left arm Left arm Left arm Left arm   Patient Position: Sitting Sitting Sitting Sitting   Pulse: 70 74 74 63   Resp: 18 20 18 18   Temp: 99 °F (37.2 °C) 98.6 °F (37 °C) 98.2 °F (36.8 °C) 98 °F (36.7 °C)   TempSrc: Oral Oral Oral Oral   SpO2: 99% 97% 97% 97%   Weight:       Height:           Body mass index is 37.67 kg/m².    CONSTITUTIONAL  General Appearance: unremarkable, age appropriate, overweight    MUSCULOSKELETAL  Muscle Strength and Tone:no tremor, no tic  Abnormal Involuntary Movements: No  Gait and Station: non-ataxic    PSYCHIATRIC   Level of Consciousness: awake, alert , and oriented  Orientation: person, place, time, and situation  Grooming: Casually dressed and less Disheveled  Psychomotor Behavior: normal, cooperative  Speech: normal tone, normal pitch, normal volume, spontaneous, rapid  Language: grossly intact  Mood: fine  Affect: Blunted  Thought Process: circumstantial and less flight of ideas  Associations: circumstantial   Thought Content: DENIES suicidal ideation, DENIES homicidal ideation, and no delusions  Perceptions: responding to internal stimuli-Improving somewhat  Memory: Able to recall past events, Remote intact, and Recent intact  Attention:Impaired to some degree  Fund of Knowledge: Impaired  Estimate if Intelligence:  Below average based on work/education history, vocabulary and mental status exam  Insight: limited awareness of illness  Judgment: behavior is adequate to circumstances    DIAGNOSTIC TESTING   Laboratory Results  No results found for this or any previous visit (from the past 24 hour(s)).    MEDICAL DECISION MAKING       ASSESSMENT:   Schizophrneia, chronic with acute exacerbation  MDD, recurrent, severe with psychotic features  Unspecified Anxiety Disorder     Psychosocial stressors     Nicotine dependence  Polysubstance abuse  Foreign body sensation in throat    Rash        PROBLEM LIST  AND MANAGEMENT PLANS     Psychosis: pt counseled  -ContinueGeodon 20 mg po BID-Increase to 40 mg BID-Continue current dose (pt declined further increases)     Depression: pt counseled  -Continue wellbutrin 150 mg/day- stopped (caused itchiness)     Anxiety: pt counseled  -started/continue vistaril prn     Psychosocial stressors: pt counseled  -SW consulted to assist with resources     Nicotine dependence: pt counseled  -started/continue Nicotine 14 mg patch dermal q day     Polysubstance abuse: pt counseled  -rehab if willing  -Consider trial of naltrexone for ETOH cravings/off-label for gambling use disorder-pt declined     Foreign body sensation in throat:  -Patient breathing and swallowing without difficulty.   -Hospital medicine consulted  -Recommend outpatient ENT follow-up  -No further recommendations at this time.     Rash: pt counseled  -continue benadryl prn    Discussed diagnosis, risks and benefits of proposed treatment vs alternative treatments vs no treatment, potential side effects of these treatments and the inherent unpredictability of treatment. The patient expresses understanding of the above and displays the capacity to agree with this treatment given said understanding. Patient also agrees that, currently, the benefits outweigh the risks and would like to pursue/continue treatment at this time.    DISCHARGE PLANNING  Expected Disposition Plan: home- he will likely be stable for discharge home Tuesday    NEED FOR CONTINUED HOSPITALIZATION  Psychiatric illness continues to pose a potential threat to life or bodily function, of self or others, thereby requiring the need for continued inpatient psychiatric hospitalization: Yes, due to: significant psychotic thought disorder, as evidenced by:  Ongoing concerns with grave disability with patient unable to perform basic feeding, hygiene and dressing activities without significant constant support.    Protective inpatient pyschiatric hospitalization  required while a safe disposition plan is enacted: Yes    Patient stabilized and ready for discharge from inpatient psychiatric unit: No      STAFF:   Ravin Alvarado MD  Psychiatry

## 2023-08-13 NOTE — NURSING
Pt to nurses's station with complaint about rash returning.  Pt already given benadryl.  Pt requests additional benadryl.  Pt informed that he can not have more benadryl for 6 hours.

## 2023-08-13 NOTE — PLAN OF CARE
Problem: Adult Behavioral Health Plan of Care  Goal: Plan of Care Review  Outcome: Ongoing, Progressing     Problem: Adult Behavioral Health Plan of Care  Goal: Patient-Specific Goal (Individualization)  Outcome: Ongoing, Progressing     Problem: Mood Impairment (Depressive Signs/Symptoms)  Goal: Improved Mood Symptoms (Depressive Signs/Symptoms)  Outcome: Ongoing, Progressing     Problem: Social, Occupational or Functional Impairment (Depressive Signs/Symptoms)  Goal: Enhanced Social, Occupational or Functional Skills (Depressive Signs/Symptoms)  Outcome: Ongoing, Progressing

## 2023-08-14 PROCEDURE — 25000003 PHARM REV CODE 250: Performed by: INTERNAL MEDICINE

## 2023-08-14 PROCEDURE — 99232 SBSQ HOSP IP/OBS MODERATE 35: CPT | Mod: ,,, | Performed by: PSYCHIATRY & NEUROLOGY

## 2023-08-14 PROCEDURE — 25000003 PHARM REV CODE 250: Performed by: PSYCHIATRY & NEUROLOGY

## 2023-08-14 PROCEDURE — 90833 PSYTX W PT W E/M 30 MIN: CPT | Mod: ,,, | Performed by: PSYCHIATRY & NEUROLOGY

## 2023-08-14 PROCEDURE — 90833 PR PSYCHOTHERAPY W/PATIENT W/E&M, 30 MIN (ADD ON): ICD-10-PCS | Mod: ,,, | Performed by: PSYCHIATRY & NEUROLOGY

## 2023-08-14 PROCEDURE — 11400000 HC PSYCH PRIVATE ROOM

## 2023-08-14 PROCEDURE — 99232 PR SUBSEQUENT HOSPITAL CARE,LEVL II: ICD-10-PCS | Mod: ,,, | Performed by: PSYCHIATRY & NEUROLOGY

## 2023-08-14 RX ADMIN — ZIPRASIDONE HYDROCHLORIDE 40 MG: 20 CAPSULE ORAL at 05:08

## 2023-08-14 RX ADMIN — HYDROXYZINE PAMOATE 50 MG: 50 CAPSULE ORAL at 08:08

## 2023-08-14 RX ADMIN — DIPHENHYDRAMINE HYDROCHLORIDE 50 MG: 50 CAPSULE ORAL at 05:08

## 2023-08-14 RX ADMIN — DOCUSATE SODIUM 100 MG: 100 CAPSULE, LIQUID FILLED ORAL at 05:08

## 2023-08-14 RX ADMIN — ZIPRASIDONE HYDROCHLORIDE 40 MG: 20 CAPSULE ORAL at 07:08

## 2023-08-14 RX ADMIN — DIPHENHYDRAMINE HYDROCHLORIDE 50 MG: 50 CAPSULE ORAL at 02:08

## 2023-08-14 NOTE — NURSING
POC reviewed this shift and is on going.  Pt calm and cooperative on unit and compliant with medications.  Pt denies SI/HI/AVH.  Pt very fixated on discharge.

## 2023-08-14 NOTE — PLAN OF CARE
POC reviewed this shift and is on going. Patient is calm and cooperative. Endorses Auditory Hallucinations, Visual Hallucinations, and Delusions. Denies Suicidal Ideation, Homicidal Ideation, Tactile Hallucinations, and Gustatory Hallucinations. Interacting well with peers. Pt continues to be medication compliant and staff will continue to monitor pt closely while on the unit.

## 2023-08-14 NOTE — PROGRESS NOTES
"PSYCHIATRY DAILY INPATIENT PROGRESS NOTE  SUBSEQUENT HOSPITAL VISIT    ENCOUNTER DATE: 8/14/2023  SITE: [facility]    DATE OF ADMISSION: 8/7/2023 11:25 PM  LENGTH OF STAY: 7 days    CHIEF COMPLAINT   Price Godoy is a 28 y.o. male, seen during daily chamberlain rounds on the inpatient unit.  Price Godoy presented with the chief complaint of bizarre behavior, suicidal ideation- pt would not answer     The patient was seen and examined. The chart was reviewed.     Reviewed notes from Rns and LPN and labs from the last 24 hours.    The patient's case was discussed with the treatment team/care providers today including Rns    Staff reports no behavioral or management issues.     The patient has been compliant with treatment.    Subjective 08/14/2023    Patient reports mood is "great," affect is appropriate though appears somewhat superficial to ensure discharge which he is very focused on today. 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     He continues to deny auditory and visual hallucinations and no longer appears to be blatantly responding to internal stimuli during assessment. He states he plans to live on his father's rental property upon discharge.       Psychiatric ROS (observed, reported, or endorsed/denied):  Depressed mood - decreasing steadily  Interest/pleasure/anhedonia: decreasing steadily  Guilt/hopelessness/worthlessness - No  Changes in Sleep - No  Changes in Appetite - No  Changes in Concentration - No  Changes in Energy - No  PMA/R- No  Suicidal- active/passive ideations - improving steadily  Homicidal ideations: active/passive ideations - No    Hallucinations - decreasing steadily  Delusions - decreasing steadily  Disorganized behavior - No  Disorganized speech - decreasing steadily  Negative symptoms - decreasing steadily    Elevated mood - No  Decreased need for sleep - " No  Grandiosity - No  Racing thoughts - No  Impulsivity - No  Irritability- No  Increased energy - No  Distractibility - no  Increase in goal-directed activity or PMA- No    Symptoms of MAGGIE - No  Symptoms of Panic Disorder- No  Symptoms of PTSD - No    Overall progress: Patient is showing moderate improvement    Psychotherapy:  Target symptoms: depression, psychosis  Why chosen therapy is appropriate versus another modality: relevant to diagnosis, evidence based practice  Outcome monitoring methods: self-report, observation  Therapeutic intervention type: insight oriented psychotherapy, supportive psychotherapy  Topics discussed/themes: building skills sets for symptom management, symptom recognition  The patient's response to the intervention is accepting. The patient's progress toward treatment goals is fair.   Duration of intervention: 16 minutes.    Medical ROS  Review of Systems   Constitutional: Negative.    HENT: Negative.     Eyes: Negative.    Respiratory: Negative.     Cardiovascular: Negative.    Gastrointestinal: Negative.    Musculoskeletal: Negative.    Skin:  Positive for rash.        Rash improving   Neurological: Negative.    Endo/Heme/Allergies: Negative.    Psychiatric/Behavioral:          As noted       PAST MEDICAL HISTORY   Past Medical History:   Diagnosis Date    Anxiety     Depression     History of psychiatric hospitalization 09/12/2019    [facility] 12/29/2022,05/29/2022,03/15/2021, 07/25/2019and [facility] 12/18/2020,09/12/2019.    Schizophrenia, unspecified     Suicide attempt        PSYCHOTROPIC MEDICATIONS   Scheduled Meds:   ziprasidone  40 mg Oral BID WM     Continuous Infusions:  PRN Meds:.acetaminophen, aluminum-magnesium hydroxide-simethicone, benzonatate, benztropine mesylate, diphenhydrAMINE, docusate sodium, hydrOXYzine pamoate, loperamide, magnesium hydroxide 400 mg/5 ml, nicotine, OLANZapine **AND** OLANZapine, ondansetron, promethazine    EXAMINATION    VITALS   Vitals:    08/12/23 1916  08/13/23 0733 08/13/23 1919 08/14/23 0737   BP: 119/73 118/68 122/68 118/70   BP Location: Left arm Left arm Left arm Left arm   Patient Position: Sitting Sitting Sitting Sitting   Pulse: 74 63 85 74   Resp: 18 18 18 20   Temp: 98.2 °F (36.8 °C) 98 °F (36.7 °C) 98.5 °F (36.9 °C) 97.8 °F (36.6 °C)   TempSrc: Oral Oral Oral Oral   SpO2: 97% 97% 97% 97%   Weight:       Height:           Body mass index is 37.67 kg/m².    CONSTITUTIONAL  General Appearance: unremarkable, age appropriate, overweight    MUSCULOSKELETAL  Muscle Strength and Tone:no tremor, no tic  Abnormal Involuntary Movements: No  Gait and Station: non-ataxic    PSYCHIATRIC   Level of Consciousness: awake, alert , and oriented  Orientation: person, place, time, and situation  Grooming: Casually dressed and less Disheveled  Psychomotor Behavior: normal, cooperative  Speech: normal tone, normal pitch, normal volume, spontaneous, rapid  Language: grossly intact  Mood: Great  Affect: Appropriate, perhaps superficial  Thought Process: less circumstantial and less flight of ideas  Associations: intact   Thought Content: DENIES suicidal ideation, DENIES homicidal ideation, and no delusions  Perceptions: responding to internal stimuli-Improving    Memory: Able to recall past events, Remote intact, and Recent intact  Attention:Impaired to some degree  Fund of Knowledge: Impaired  Estimate if Intelligence:  Below average based on work/education history, vocabulary and mental status exam  Insight: limited awareness of illness  Judgment: behavior is adequate to circumstances    DIAGNOSTIC TESTING   Laboratory Results  No results found for this or any previous visit (from the past 24 hour(s)).    MEDICAL DECISION MAKING       ASSESSMENT:   Schizophrneia, chronic with acute exacerbation  MDD, recurrent, severe with psychotic features  Unspecified Anxiety Disorder     Psychosocial stressors     Nicotine dependence  Polysubstance abuse  Foreign body sensation in  throat    Rash        PROBLEM LIST AND MANAGEMENT PLANS     Psychosis: pt counseled  -ContinueGeodon 20 mg po BID-Increase to 40 mg BID-Continue current dose (pt declined further increases)     Depression: pt counseled  -Continue wellbutrin 150 mg/day- stopped (caused itchiness)     Anxiety: pt counseled  -started/continue vistaril prn     Psychosocial stressors: pt counseled  -SW consulted to assist with resources     Nicotine dependence: pt counseled  -started/continue Nicotine 14 mg patch dermal q day     Polysubstance abuse: pt counseled  -rehab if willing  -Consider trial of naltrexone for ETOH cravings/off-label for gambling use disorder-pt declined     Foreign body sensation in throat:  -Patient breathing and swallowing without difficulty.   -Hospital medicine consulted  -Recommend outpatient ENT follow-up  -No further recommendations at this time.     Rash: pt counseled  -continue benadryl prn    Discussed diagnosis, risks and benefits of proposed treatment vs alternative treatments vs no treatment, potential side effects of these treatments and the inherent unpredictability of treatment. The patient expresses understanding of the above and displays the capacity to agree with this treatment given said understanding. Patient also agrees that, currently, the benefits outweigh the risks and would like to pursue/continue treatment at this time.    DISCHARGE PLANNING  Expected Disposition Plan: home- he will likely be stable for discharge home Tuesday    NEED FOR CONTINUED HOSPITALIZATION  Psychiatric illness continues to pose a potential threat to life or bodily function, of self or others, thereby requiring the need for continued inpatient psychiatric hospitalization: Yes, due to: significant psychotic thought disorder, as evidenced by:  Ongoing concerns with grave disability with patient unable to perform basic feeding, hygiene and dressing activities without significant constant support.    Protective  inpatient pyschiatric hospitalization required while a safe disposition plan is enacted: Yes    Patient stabilized and ready for discharge from inpatient psychiatric unit: No      STAFF:   Jarad Whalen NP  Psychiatry

## 2023-08-14 NOTE — NURSING
Pt to nurses's station with request for medication for sleep.  Pt informed that he had vistaril less than 6 hours ago and cannot have another dose at this time.  Pt states that vistaril is not for sleep and he will not take it for such because it is not prescribed to him outside of the hospital.  Pt educated on PRN medications.  Pt states that he will not take vistaril for sleep.  Pt noted to be scratching his rash.  Pt offered benadryl PRN in about 1 hour.  Pt refused.  Stated that he does not want to take benadryl.  Offer made to call MD for additional medication guidance.  Pt refused, stated that he would not take any medication that the MD ordered tonight.  Pt then requested that MD be called and notified that he has a job offer for in the morning and he needs to leave the unit.  Pt assured that a note would be placed in his chart stating that he wants to be discharged tomorrow.

## 2023-08-15 VITALS
OXYGEN SATURATION: 99 % | WEIGHT: 270.06 LBS | SYSTOLIC BLOOD PRESSURE: 121 MMHG | HEART RATE: 57 BPM | DIASTOLIC BLOOD PRESSURE: 60 MMHG | BODY MASS INDEX: 37.81 KG/M2 | TEMPERATURE: 99 F | HEIGHT: 71 IN | RESPIRATION RATE: 20 BRPM

## 2023-08-15 PROCEDURE — 25000003 PHARM REV CODE 250: Performed by: PSYCHIATRY & NEUROLOGY

## 2023-08-15 PROCEDURE — 90833 PR PSYCHOTHERAPY W/PATIENT W/E&M, 30 MIN (ADD ON): ICD-10-PCS | Mod: ,,, | Performed by: STUDENT IN AN ORGANIZED HEALTH CARE EDUCATION/TRAINING PROGRAM

## 2023-08-15 PROCEDURE — 99239 PR HOSPITAL DISCHARGE DAY,>30 MIN: ICD-10-PCS | Mod: ,,, | Performed by: STUDENT IN AN ORGANIZED HEALTH CARE EDUCATION/TRAINING PROGRAM

## 2023-08-15 PROCEDURE — 90833 PSYTX W PT W E/M 30 MIN: CPT | Mod: ,,, | Performed by: STUDENT IN AN ORGANIZED HEALTH CARE EDUCATION/TRAINING PROGRAM

## 2023-08-15 PROCEDURE — 99239 HOSP IP/OBS DSCHRG MGMT >30: CPT | Mod: ,,, | Performed by: STUDENT IN AN ORGANIZED HEALTH CARE EDUCATION/TRAINING PROGRAM

## 2023-08-15 RX ORDER — HYDROXYZINE PAMOATE 50 MG/1
50 CAPSULE ORAL NIGHTLY PRN
Qty: 30 CAPSULE | Refills: 0 | Status: ON HOLD | OUTPATIENT
Start: 2023-08-15 | End: 2023-12-07 | Stop reason: HOSPADM

## 2023-08-15 RX ORDER — DOCUSATE SODIUM 100 MG/1
100 CAPSULE, LIQUID FILLED ORAL DAILY PRN
Qty: 30 CAPSULE | Refills: 0 | Status: ON HOLD | OUTPATIENT
Start: 2023-08-15 | End: 2023-12-07 | Stop reason: HOSPADM

## 2023-08-15 RX ORDER — ZIPRASIDONE HYDROCHLORIDE 40 MG/1
40 CAPSULE ORAL 2 TIMES DAILY WITH MEALS
Qty: 60 CAPSULE | Refills: 0 | Status: ON HOLD | OUTPATIENT
Start: 2023-08-15 | End: 2023-12-07 | Stop reason: HOSPADM

## 2023-08-15 RX ADMIN — ZIPRASIDONE HYDROCHLORIDE 40 MG: 20 CAPSULE ORAL at 07:08

## 2023-08-15 NOTE — PLAN OF CARE
Problem: Adult Behavioral Health Plan of Care  Goal: Optimized Coping Skills in Response to Life Stressors  Intervention: Promote Effective Coping Strategies  Flowsheets (Taken 8/14/2023 1230)  Supportive Measures:   active listening utilized   self-reflection promoted   goal-setting facilitated   verbalization of feelings encouraged       Behavior:  Pt was incoherent during group. Pt was responding to internal stimuli.           Intervention: In this CBT-focused group, patients discussed the importance of setting goals and each patient was asked to identify specific goals to aid in their future treatment.           Response: Pt identified goals as to study coping skills.          Plan: Continue to encourage pt to participate in process groups to verbalize feelings and develop healthy coping skills.

## 2023-08-15 NOTE — PLAN OF CARE
Patient stayed in activity room until bed. Patient restless throughout the night stating he was unable to sleep. Patient hyper verbal and at nurses station multiple times throughout the night with various questions. Patient needed to be redirected to room multiple times. POC ongoing.

## 2023-08-15 NOTE — DISCHARGE SUMMARY
"Discharge Summary  Psychiatry    Admit Date: 8/7/2023    Discharge Date and Time:  08/15/2023 10:56 AM    Attending Physician: Ravin Alvarado MD     Discharge Provider: Leroy Howard III    Reason for Admission:    CHIEF COMPLAINT   Price Godoy is a 28 y.o. male with a past psychiatric history schizophrenia vs. Schizoaffective disorder  currently admitted to the inpatient unit with the following chief complaint:  bizarre behavior, suicidal ideation- pt would not answer     HPI   The patient was seen and examined. The chart was reviewed.     The patient presented to the ER on 8/7/2023 . Per staff notes:  -pt follows commands but when not answering questions he is giggling and then staring and the ceiling  -Brought in by crises unit (Bharathi). Reports +visual and auditory hallucination since running out of meds. Pt unsure if took meds today. Denies SI. +HI pt states " I feel like the globe is out to get me" Pt staring in the aleyda  -This is a 28 y.o. male with a PMHx of schizoaffective disorder who presents via Crisis Unit with complaint of auditory and visual hallucinations. He states that he is currently on medication to combat this, but is short on them at this time. He is unable to make out what the voices say, but notes that the visual hallucinations show ar "things that are not true". He denies any suicidal or homicidal ideations. He also states that he "believes everyone is out to get him". The patient requests either rehab or a long stay at a hospital. He admits to a history of smoking and thinks he needs rehab for this. Patient denies any other complaints at this time  -Patient with schizoaffective disorder vs schizophrenia, presents via crisis unit.  He was recently hospitalized on psych unit - released about a week ago, returns with relapse into acute psychosis.  He reports he is compliant with medication - geodon and prozac - but that they are ineffective.  He is noted to be oddly related, " "responding to internal stimuli  -- states he is doing good  - here because he is low on meds - Geodon  - "afraid I may not have the medicine I need"  - "I see a lot of things going on" - "primarily at the house"  - "I feel like it's my right to bare arms - I think I need to get one"  - "wants to get another gun for protection"  - no current suicidal ideation/homicidal ideation  - just discharged from psych unit about a week ago  - no longer on monthly shot              The patient was medically cleared and admitted to the [facility].     The patient was previously seen in 2/2023 with the following HPI:  -Provided to the medical student: Patient has an extensive history of psychiatric hospitalizations. Most recently leaving [facility] 1 month ago. Patient states, "I'm just looking for a home, just been lost for a while." Patient could not elaborate further as he was easily distracted with extensive thought blocking. He states that the abilify was working for him but would prefer daily medication instead of the long acting injection. Patient states this due to, "wanting freedom to feel himself sometimes." He states he doesn't have any support system, transportation, or money to support himself. He has most recently been in [facility], [place]. Patient was pushed to provide more information but repeatedly gave one word answers and changed his answers numerous times.  During the assessment, the patient was a limited historian and unreliable. He denied all symptoms, then endorsed depression. He has negative symptoms of psychosis, with noted RIS. He is homeless. -he has a long history of hospitalizations and medication noncomplaince.       He was stabilized and discharged on Risperdal 4 mg po BID and vistaril.     He had another hospital stay in late June to ealry July 2023 for similar reasons.      Today, he would not participate with the assessment. The following was obtained by staff/ observation and chart review.   "     Procedures Performed: * No surgery found *    Hospital Course:    Patient was admitted to the inpatient psychiatry unit after being medically cleared in the ED. Chart and labs were reviewed. The patient was stabilized as follows:    Psychosis: pt counseled  -ContinueGeodon 20 mg po BID-Increase to 40 mg BID-Continue current dose (pt declined further increases)     Depression: pt counseled  -Continue wellbutrin 150 mg/day- stopped (caused itchiness)     Anxiety: pt counseled  -started/continue vistaril prn     Psychosocial stressors: pt counseled  -SW consulted to assist with resources     Nicotine dependence: pt counseled  -started/continue Nicotine 14 mg patch dermal q day     Polysubstance abuse: pt counseled  -rehab if willing  -Consider trial of naltrexone for ETOH cravings/off-label for gambling use disorder-pt declined      Foreign body sensation in throat:  -Patient breathing and swallowing without difficulty.   -Hospital medicine consulted  -Recommend outpatient ENT follow-up  -No further recommendations at this time.      Rash: pt counseled  -continue benadryl prn        During hospitalization, the patient was encouraged to go to both groups and individual counseling. Patient was monitored for any side effects. A meeting was held with multidisciplinary team prior to discharge and pt's diagnosis, current medications, and follow up were discussed. The patient has been compliant with treatment and can adequately attend to activities of daily living in an independent manner. The patient denies any side effects. The patient denies SI, HI, plan or intent for self harm or harm to others. The patient is no longer a danger to self or others nor gravely disabled disabled. Patient discharged  in stable condition with scheduled outpatient follow up.      Discussed diagnosis, risks and benefits of proposed treatment vs alternative treatments vs no treatment, and potential side effects of these treatments.  The  patient expresses understanding of the above and displays the capacity to agree with this treatment given said understanding.  Patient also agrees that, currently, the benefits outweigh the risks and would like to pursue treatment at this time.      Discharge MSE: stated age, casually dressed, well groomed.  No psychomotor agitation or retardation.  No abnormal involuntary movements.  Gait normal.  Speech normal, conversational.  Language fluent English. Mood fine.  Affect normal range, pleasant, euthymic.  Thought process linear.  Associations intact.  Denies suicidal or homicidal ideation.  Denies auditory hallucinations, paranoid ideation, ideas of reference.  Memory intact.  Attention intact.  Fund of knowledge intact.  Insight intact.  Judgment intact.  Alert and oriented to person, place, time.      Tobacco Usage:  Is patient a smoker? Yes  Does patient want prescription for Tobacco Cessation? No  Does patient want counseling for Tobacco Cessation? No    If patient would like to quit, then over the counter nicotine patch could be used. The patient could also follow up with his PCP or psychiatric provider for other alternatives.     Final Diagnoses:    Principal Problem: Schizophrneia, chronic with acute exacerbation   Secondary Diagnoses:     MDD, recurrent, severe with psychotic features  Unspecified Anxiety Disorder     Psychosocial stressors     Nicotine dependence  Polysubstance abuse  Foreign body sensation in throat     Rash    Labs:  Admission on 08/07/2023   Component Date Value Ref Range Status    Cholesterol 08/08/2023 135  120 - 199 mg/dL Final    Triglycerides 08/08/2023 122  30 - 150 mg/dL Final    HDL 08/08/2023 31 (L)  40 - 75 mg/dL Final    LDL Cholesterol 08/08/2023 79.6  63.0 - 159.0 mg/dL Final    HDL/Cholesterol Ratio 08/08/2023 23.0  20.0 - 50.0 % Final    Total Cholesterol/HDL Ratio 08/08/2023 4.4  2.0 - 5.0 Final    Non-HDL Cholesterol 08/08/2023 104  mg/dL Final    Hemoglobin A1C  08/08/2023 4.5  4.0 - 5.6 % Final    Estimated Avg Glucose 08/08/2023 82  68 - 131 mg/dL Final   Admission on 08/07/2023, Discharged on 08/07/2023   Component Date Value Ref Range Status    HIV 1/2 Ag/Ab 08/07/2023 Negative  Negative Final    Hepatitis C Ab 08/07/2023 Negative  Negative Final    WBC 08/07/2023 7.39  3.90 - 12.70 K/uL Final    RBC 08/07/2023 4.44 (L)  4.60 - 6.20 M/uL Final    Hemoglobin 08/07/2023 12.9 (L)  14.0 - 18.0 g/dL Final    Hematocrit 08/07/2023 39.2 (L)  40.0 - 54.0 % Final    MCV 08/07/2023 88  82 - 98 fL Final    MCH 08/07/2023 29.1  27.0 - 31.0 pg Final    MCHC 08/07/2023 32.9  32.0 - 36.0 g/dL Final    RDW 08/07/2023 12.3  11.5 - 14.5 % Final    Platelets 08/07/2023 281  150 - 450 K/uL Final    MPV 08/07/2023 9.4  9.2 - 12.9 fL Final    Immature Granulocytes 08/07/2023 0.3  0.0 - 0.5 % Final    Gran # (ANC) 08/07/2023 3.8  1.8 - 7.7 K/uL Final    Immature Grans (Abs) 08/07/2023 0.02  0.00 - 0.04 K/uL Final    Lymph # 08/07/2023 2.8  1.0 - 4.8 K/uL Final    Mono # 08/07/2023 0.6  0.3 - 1.0 K/uL Final    Eos # 08/07/2023 0.1  0.0 - 0.5 K/uL Final    Baso # 08/07/2023 0.03  0.00 - 0.20 K/uL Final    nRBC 08/07/2023 0  0 /100 WBC Final    Gran % 08/07/2023 51.0  38.0 - 73.0 % Final    Lymph % 08/07/2023 38.4  18.0 - 48.0 % Final    Mono % 08/07/2023 8.1  4.0 - 15.0 % Final    Eosinophil % 08/07/2023 1.8  0.0 - 8.0 % Final    Basophil % 08/07/2023 0.4  0.0 - 1.9 % Final    Differential Method 08/07/2023 Automated   Final    Sodium 08/07/2023 140  136 - 145 mmol/L Final    Potassium 08/07/2023 3.4 (L)  3.5 - 5.1 mmol/L Final    Chloride 08/07/2023 108  95 - 110 mmol/L Final    CO2 08/07/2023 22 (L)  23 - 29 mmol/L Final    Glucose 08/07/2023 89  70 - 110 mg/dL Final    BUN 08/07/2023 9  6 - 20 mg/dL Final    Creatinine 08/07/2023 0.9  0.5 - 1.4 mg/dL Final    Calcium 08/07/2023 9.1  8.7 - 10.5 mg/dL Final    Total Protein 08/07/2023 7.4  6.0 - 8.4 g/dL Final    Albumin 08/07/2023 4.2  3.5 -  5.2 g/dL Final    Total Bilirubin 08/07/2023 0.4  0.1 - 1.0 mg/dL Final    Alkaline Phosphatase 08/07/2023 75  55 - 135 U/L Final    AST 08/07/2023 17  10 - 40 U/L Final    ALT 08/07/2023 28  10 - 44 U/L Final    eGFR 08/07/2023 >60  >60 mL/min/1.73 m^2 Final    Anion Gap 08/07/2023 10  8 - 16 mmol/L Final    TSH 08/07/2023 0.215 (L)  0.400 - 4.000 uIU/mL Final    Specimen UA 08/07/2023 Urine, Clean Catch   Final    Color, UA 08/07/2023 Yellow  Yellow, Straw, Luz Final    Appearance, UA 08/07/2023 Clear  Clear Final    pH, UA 08/07/2023 6.0  5.0 - 8.0 Final    Specific Gravity, UA 08/07/2023 >1.030 (A)  1.005 - 1.030 Final    Protein, UA 08/07/2023 Trace (A)  Negative Final    Glucose, UA 08/07/2023 Negative  Negative Final    Ketones, UA 08/07/2023 Negative  Negative Final    Bilirubin (UA) 08/07/2023 Negative  Negative Final    Occult Blood UA 08/07/2023 Negative  Negative Final    Nitrite, UA 08/07/2023 Negative  Negative Final    Urobilinogen, UA 08/07/2023 2.0-3.0 (A)  <2.0 EU/dL Final    Leukocytes, UA 08/07/2023 Negative  Negative Final    Benzodiazepines 08/07/2023 Negative  Negatiive Final    Methadone metabolites 08/07/2023 Negative  Negative Final    Cocaine (Metab.) 08/07/2023 Negative  Negative Final    Opiate Scrn, Ur 08/07/2023 Negative  Negative Final    Barbiturate Screen, Ur 08/07/2023 Negative  Negative Final    Amphetamine Screen, Ur 08/07/2023 Negative  Negative Final    THC 08/07/2023 Presumptive Positive (A)  Negative Final    Phencyclidine 08/07/2023 Negative  Negative Final    Creatinine, Urine 08/07/2023 412.4 (H)  23.0 - 375.0 mg/dL Final    Toxicology Information 08/07/2023 SEE COMMENT   Final    Alcohol, Serum 08/07/2023 <10  <10 mg/dL Final    Acetaminophen (Tylenol), Serum 08/07/2023 <3.0 (L)  10.0 - 20.0 ug/mL Final    Free T4 08/07/2023 1.01  0.71 - 1.51 ng/dL Final         Discharged Condition: stable and improved; not currently a danger to self/others or gravely  disabled    Disposition: Home or Self Care    Is patient being discharged on multiple neuroleptics? No    Follow Up/Patient Instructions:     Take all medications as prescribed.  Attend all psychiatric and medical follow up appointments.   Abstain from all drugs and alcohol.  Call the crisis line at: [phone] for help in a crisis and emergent situations or call 911 and Return to ED for any acute worsening of your condition including suicidal or homicidal ideations      Discharge Procedure Orders   Diet Adult Regular     Notify your health care provider if you experience any of the following:  temperature >100.4     Notify your health care provider if you experience any of the following:  persistent nausea and vomiting or diarrhea     Notify your health care provider if you experience any of the following:   Order Comments: Suicidal thoughts, homicidal thoughts, or any other changes in mental status.  If you would like immediate help/crisis counseling, please call [phone] (TALK). Through this toll-free phone number for a network of crisis centers across the country. These centers staff their lines with people who are trained to listen and offer support to people in emotional crisis. If you are in an emergency, please call 911.     Notify your health care provider if you experience any of the following:  increased confusion or weakness     Notify your health care provider if you experience any of the following:  persistent dizziness, light-headedness, or visual disturbances     Notify your health care provider if you experience any of the following:  worsening rash     Activity as tolerated           [place] [facility] [place]      Medications:  Reconciled Home Medications:      Medication List        START taking these medications      docusate sodium 100 MG capsule  Commonly known as: COLACE  Take 1 capsule (100 mg total) by mouth daily as needed for Constipation (first choice).      hydrOXYzine pamoate 50 MG Cap  Commonly known as: VISTARIL  Take 1 capsule (50 mg total) by mouth nightly as needed (Insomnia).            CHANGE how you take these medications      ziprasidone 40 MG Cap  Commonly known as: GEODON  Take 1 capsule (40 mg total) by mouth 2 (two) times daily with meals.  What changed:   medication strength  how much to take  when to take this                Psychotherapy:  Target symptoms: substance abuse, psychosis  Why chosen therapy is appropriate versus another modality: relevant to diagnosis  Outcome monitoring methods: self-report  Therapeutic intervention type: supportive psychotherapy  Topics discussed/themes: building skills sets for symptom management, symptom recognition, substance abuse  The patient's response to the intervention is accepting. The patient's progress toward treatment goals is fair.   Duration of intervention: 16 minutes.        Diet: regular     Activity as tolerated    Total time spent discharging patient: 34 minutes    Leroy Howard III, MD  Psychiatry

## 2023-08-15 NOTE — NURSING
Patient has met all criteria to be discharged today. Patient was explained all discharge instructions and the patient verbalized an understanding of those instructions. Patient was returned all personal belongings upon departure. Patient was escorted off the unit and out of the hospital by a staff member.

## 2023-08-16 ENCOUNTER — PATIENT OUTREACH (OUTPATIENT)
Dept: ADMINISTRATIVE | Facility: CLINIC | Age: 28
End: 2023-08-16
Payer: MEDICAID

## 2023-08-16 NOTE — PROGRESS NOTES
C3 nurse attempted to contact Price KASIE Godoy  for a TCC post hospital discharge follow up call. No answer. Left voicemail with callback information. The patient does not have a scheduled HOSFU appointment. Message sent to PCP staff for assistance with scheduling visit with patient.

## 2023-08-17 NOTE — PROGRESS NOTES
2nd Attempt made to reach patient for TCC call. Left voicemail please call [phone] leave first name, last name, and  for Malou I will return your call.

## 2023-08-17 NOTE — PROGRESS NOTES
3rd Attempt made to reach patient for TCC call. Left voicemail please call [phone] leave first name, last name, and  for Malou I will return your call.

## 2023-08-23 ENCOUNTER — HOSPITAL ENCOUNTER (EMERGENCY)
Facility: OTHER | Age: 28
Discharge: HOME | End: 2023-08-23
Attending: EMERGENCY MEDICINE
Payer: MEDICAID

## 2023-08-23 VITALS
WEIGHT: 280 LBS | SYSTOLIC BLOOD PRESSURE: 142 MMHG | OXYGEN SATURATION: 96 % | RESPIRATION RATE: 18 BRPM | HEART RATE: 99 BPM | TEMPERATURE: 98 F | BODY MASS INDEX: 39.2 KG/M2 | HEIGHT: 71 IN | DIASTOLIC BLOOD PRESSURE: 71 MMHG

## 2023-08-23 DIAGNOSIS — F25.9 SCHIZOAFFECTIVE DISORDER, UNSPECIFIED TYPE: ICD-10-CM

## 2023-08-23 DIAGNOSIS — R09.A2 FOREIGN BODY SENSATION IN THROAT: Primary | ICD-10-CM

## 2023-08-23 PROCEDURE — 99284 EMERGENCY DEPT VISIT MOD MDM: CPT

## 2023-08-24 NOTE — ED PROVIDER NOTES
"Encounter Date: 8/23/2023    SCRIBE #1 NOTE: I, Arleth Basilio, am scribing for, and in the presence of,  Stephanie Harden MD. I have scribed the following portions of the note - Other sections scribed: HPI, ROS PE.       History     Chief Complaint   Patient presents with    Mental Health Problem     Contacted crisis line accompanied with Crisis staff, feels unsafe on the streets due to recent abandonment by mother and father. Hx schizoaffective, Onset 1.5 weeks, visual and auditory hallucinations - telling him to hurt hisself, sts he has been overmedicating self with psych meds. "Swallowed cigarette butt during Josselin", (+) flight of ideas and disorganized thoughts.     Price Godoy is a 28 y.o. male who presents to the ED c/o sensation of foreign body in throat for 2 years since Josselin. He notes being seen by ENT a few months ago but has not followed up. Patient also reports overmedicating self with psychiatric medications. He notes normally taking 1 Ziprasidone, and 1 dose of a stool softener. As of recently, patient has been taking 3 of Ziprasidone and 2 doses of stool softeners. Last dose of these medications were taken prior to arrival. Pt does note having a scheduled appointment soon but is unsure whether this is his psychiatrist. In addition, he endorses having auditory and visual hallucinations stating he sees a girl named Meliza and has voices that that him he is "going to get shot". Pt denied SI or HI. Pt is a smoker. This is the extent of the patient's complaints at this time.          The history is provided by the patient and medical records. No  was used.     Review of patient's allergies indicates:   Allergen Reactions    Amoxicillin      Past Medical History:   Diagnosis Date    Anxiety     Depression     History of psychiatric hospitalization 09/12/2019    [facility] 12/29/2022,05/29/2022,03/15/2021, 07/25/2019and [facility] 12/18/2020,09/12/2019.    Schizophrenia, unspecified     Suicide attempt  "     Past Surgical History:   Procedure Laterality Date    APPENDECTOMY       Family History   Problem Relation Age of Onset    Seizures Mother     Mental illness Brother     Paranoid behavior Maternal Uncle     Schizophrenia Maternal Uncle     Drug abuse Paternal Uncle     Alcohol abuse Paternal Uncle     Diabetes Maternal Grandmother     Depression Paternal Grandmother     Diabetes Paternal Grandmother     Physical abuse Cousin      Social History     Tobacco Use    Smoking status: Every Day     Current packs/day: 1.00     Average packs/day: 1 pack/day for 2.0 years (2.0 ttl pk-yrs)     Types: Cigarettes    Smokeless tobacco: Never    Tobacco comments:     Pt stated that he smokes 1 pack of tobacco daily.   Substance Use Topics    Alcohol use: Not Currently     Comment: socially, occ    Drug use: Yes     Types: Marijuana     Review of Systems   Constitutional:  Negative for fever.   HENT:  Negative for sore throat.    Respiratory:  Negative for shortness of breath.    Cardiovascular:  Negative for chest pain.   Gastrointestinal:  Negative for nausea.   Genitourinary:  Negative for dysuria.   Musculoskeletal:  Negative for back pain.   Skin:  Negative for rash.   Neurological:  Negative for weakness.   Hematological:  Does not bruise/bleed easily.   Psychiatric/Behavioral:  Positive for hallucinations. Negative for suicidal ideas.    All other systems reviewed and are negative.      Physical Exam     Initial Vitals [08/23/23 2051]   BP Pulse Resp Temp SpO2   (!) 142/71 99 18 97.5 °F (36.4 °C) 96 %      MAP       --         Physical Exam    Nursing note and vitals reviewed.  Constitutional: He appears well-developed and well-nourished. He is not diaphoretic. He is cooperative. No distress.   Calm and Cooperative.    HENT:   Head: Normocephalic and atraumatic.   Right Ear: External ear normal.   Left Ear: External ear normal.   Mouth/Throat: Oropharynx is clear and moist. No oropharyngeal exudate.   No edema.  Controlling secretions. No visible foreign body.   Eyes: Conjunctivae and lids are normal.   Neck:   Normal range of motion.  Cardiovascular:  Normal rate, regular rhythm and normal heart sounds.     Exam reveals no friction rub.       No murmur heard.  Pulmonary/Chest: Breath sounds normal. No respiratory distress. He has no wheezes. He has no rhonchi. He has no rales.   Musculoskeletal:         General: Normal range of motion.      Cervical back: Normal range of motion.     Neurological: He is alert and oriented to person, place, and time. He has normal strength. GCS score is 15. GCS eye subscore is 4. GCS verbal subscore is 5. GCS motor subscore is 6.   Involuntary head jerking.   Skin: No rash noted.   Psychiatric: He has a normal mood and affect. His speech is normal and behavior is normal.   No SI or HI.         ED Course   Procedures  Labs Reviewed - No data to display       Imaging Results    None          Medications - No data to display  Medical Decision Making  28-year-old male with schizoaffective disorder presents primarily for evaluation of throat discomfort.  He admits to auditory and visual hallucinations but has no suicidal or homicidal ideation and is not gravely disabled.  He did not meet criteria for a pec.  He would like to have the cigarette he swallowed a proximally 2 years ago removed.      Head and neck exam is unremarkable.  He has already been evaluated for this complaint multiple times and has even had a CT of the soft tissue.  There is no emergent intervention warranted for this time.  The patient was offered referral to ENT which he is desiring.  Ambulatory referral to ENT and GI placed.  Patient discharged home in stable condition.            Scribe Attestation:   Scribe #1: I performed the above scribed service and the documentation accurately describes the services I performed. I attest to the accuracy of the note.                        Clinical Impression:   Final  diagnoses:  [R09.89] Foreign body sensation in throat (Primary)  [F25.9] Schizoaffective disorder, unspecified type     Physician Attestation for Scribe: I, Stephanie Harden  , reviewed documentation as scribed in my presence, which is both accurate and complete.     ED Disposition Condition    Discharge Stable          ED Prescriptions    None       Follow-up Information       Follow up With Specialties Details Why Contact Info Additional Information    [facility] - [facility] Otol[facility]ology Schedule an appointment as soon as possible for a visit   [id number]  [facility] [id number] [phone] Ear, N[facility] & Th[facility] Services - Main Building, 4th Floor Please park in [facility] and use Clinic elevator             Stephanie Harden MD  08/24/23 [id number]

## 2023-08-24 NOTE — ED TRIAGE NOTES
"Per triage note, pt presents contacted crisis line, presents accompanied with crisis staff, feels unsafe of the streets due to recent abandonment by mother and father. Hx of schizoaffective disorder, onset 1.5 weeks, visual and auditory hallucinations -- telling him to hurt himself, states he has been overmedicating self with psych meds. "Swallowed cigarette butt during Josselin." +flight of ideas and disorganized thoughts.    Received patient cheerful and cooperative. Reports feeling abandoned by parents and grandmother. Pt noted to have delayed responses and jerking head to the side during assessment. Endorses psych hx of schizoaffective disorder, reporting overmedicating for an unknown amount of time, last dose today. Speech clear, but disorganized. Denies any drug or alcohol use. Follows commands, alert and cooperative. Awaiting MD black. No apparent distress noted at this time.   "

## 2023-08-24 NOTE — ED NOTES
AVS reviewed with patient. ENT referral discussed with patient, verbalized understanding. No questions or concerns verbalized at this time. Pt alert, ambulatory, and stable at time of discharge. Escorted to exit by security. No apparent distress noted.

## 2023-09-25 PROBLEM — Z13.9 ENCOUNTER FOR MEDICAL SCREENING EXAMINATION: Status: RESOLVED | Noted: 2021-03-15 | Resolved: 2023-09-25

## 2023-11-02 NOTE — PROGRESS NOTES
"PSYCHIATRY DAILY INPATIENT PROGRESS NOTE  SUBSEQUENT HOSPITAL VISIT    ENCOUNTER DATE: 8/10/2023  SITE: [facility]    DATE OF ADMISSION: 8/7/2023 11:25 PM  LENGTH OF STAY: 3 days    CHIEF COMPLAINT   Price Godoy is a 28 y.o. male, seen during daily chamberlain rounds on the inpatient unit.  Price Godoy presented with the chief complaint of bizarre behavior, suicidal ideation- pt would not answer     The patient was seen and examined. The chart was reviewed.     Reviewed notes from Rns and MD and labs from the last 24 hours.    The patient's case was discussed with the treatment team/care providers today including Rns and MD    Staff reports no behavioral or management issues.     The patient has been compliant with treatment.    Subjective 08/10/2023    Patient remains somewhat hyperverbal and circumstantial, requiring frequent redirection. Patient is fixated on thought that something is "stuck in his throat." On review of chart, patient was seen in ER last month for the same complaint, CT scan showed some abnormalities including mildly enlarged lymph node and soft pallet thickening. Patient reports these symptoms started after "Eating a cigarette butt." When asked for additional detail, patient states "I was at a gas station buying cigarettes when I saw this girl that I felt like I needed to kiss, like that would heal my mind. But I couldn't do it, so then I tried to kiss my brother and he stopped me. Then something told me to eat the cigarette butt, because that would heal my mind." Pt reports cervical tenderness to palpation, hospital medicine consulted.     Pt refused Wellbutrin this morning, stating "I wasn't sure that my doctor ordered it." Reminded patient of conversation had yesterday regarding this medication and it's potential to help with mood as well as nicotine cravings, he verbalized understanding and states he will take the medication.      Psychiatric ROS (observed, reported, or " endorsed/denied):  Depressed mood - fluctuating  Interest/pleasure/anhedonia: fluctuating  Guilt/hopelessness/worthlessness - No  Changes in Sleep - No  Changes in Appetite - No  Changes in Concentration - No  Changes in Energy - No  PMA/R- No  Suicidal- active/passive ideations - fluctuating  Homicidal ideations: active/passive ideations - No    Hallucinations - fluctuating  Delusions - fluctuating  Disorganized behavior - No  Disorganized speech - Yes  Negative symptoms - Yes    Elevated mood - No  Decreased need for sleep - No  Grandiosity - No  Racing thoughts - No  Impulsivity - No  Irritability- No  Increased energy - No  Distractibility - Yes  Increase in goal-directed activity or PMA- No    Symptoms of MAGGIE - No  Symptoms of Panic Disorder- No  Symptoms of PTSD - No    Overall progress: Patient is showing mild improvement     Psychotherapy:  Target symptoms: depression, psychosis  Why chosen therapy is appropriate versus another modality: relevant to diagnosis, evidence based practice  Outcome monitoring methods: self-report, observation  Therapeutic intervention type: insight oriented psychotherapy, supportive psychotherapy  Topics discussed/themes: building skills sets for symptom management, symptom recognition  The patient's response to the intervention is accepting. The patient's progress toward treatment goals is fair.   Duration of intervention: 16 minutes.    Medical ROS  Review of Systems   Constitutional: Negative.    HENT: Negative.     Eyes: Negative.    Respiratory: Negative.     Cardiovascular: Negative.    Gastrointestinal: Negative.    Musculoskeletal: Negative.    Skin: Negative.    Neurological: Negative.    Endo/Heme/Allergies: Negative.    Psychiatric/Behavioral:  Positive for hallucinations.        PAST MEDICAL HISTORY   Past Medical History:   Diagnosis Date    Anxiety     Depression     History of psychiatric hospitalization 09/12/2019    [facility] 12/29/2022,05/29/2022,03/15/2021,  07/25/2019and [facility] 12/18/2020,09/12/2019.    Schizophrenia, unspecified     Suicide attempt        PSYCHOTROPIC MEDICATIONS   Scheduled Meds:   buPROPion  150 mg Oral Daily    ziprasidone  40 mg Oral BID WM     Continuous Infusions:  PRN Meds:.acetaminophen, aluminum-magnesium hydroxide-simethicone, benzonatate, benztropine mesylate, hydrOXYzine pamoate, loperamide, magnesium hydroxide 400 mg/5 ml, nicotine, OLANZapine **AND** OLANZapine, ondansetron, promethazine    EXAMINATION    VITALS   Vitals:    08/08/23 1925 08/09/23 0759 08/09/23 1919 08/10/23 0724   BP: (!) 113/58 113/70 126/69 121/70   BP Location: Left arm Left arm Left arm Left arm   Patient Position: Sitting Sitting Sitting Sitting   Pulse: 90 75 70 65   Resp: 18 18 16 20   Temp: 98.4 °F (36.9 °C) 98.1 °F (36.7 °C) 98.9 °F (37.2 °C) 98.2 °F (36.8 °C)   TempSrc: Oral Oral Oral Oral   SpO2: 97% 95% 97% 95%   Weight:       Height:           Body mass index is 37.67 kg/m².    CONSTITUTIONAL  General Appearance: unremarkable, age appropriate, overweight    MUSCULOSKELETAL  Muscle Strength and Tone:no tremor, no tic  Abnormal Involuntary Movements: No  Gait and Station: non-ataxic    PSYCHIATRIC   Level of Consciousness: awake, alert , and oriented  Orientation: person, place, time, and situation  Grooming: Casually dressed and Disheveled  Psychomotor Behavior: normal, cooperative  Speech: normal tone, normal pitch, pressured  Language: grossly intact  Mood: fine  Affect: Blunted  Thought Process: circumstantial and flight of ideas  Associations: circumstantial   Thought Content: DENIES suicidal ideation, DENIES homicidal ideation, and no delusions  Perceptions: responding to internal stimuli  Memory: Able to recall past events, Remote intact, and Recent intact  Attention:Impaired to some degree  Fund of Knowledge: Impaired  Estimate if Intelligence:  Below average based on work/education history, vocabulary and mental status exam  Insight: limited awareness  of illness  Judgment: behavior is adequate to circumstances    DIAGNOSTIC TESTING   Laboratory Results  No results found for this or any previous visit (from the past 24 hour(s)).    MEDICAL DECISION MAKING       ASSESSMENT:   Schizophrneia, chronic with acute exacerbation  MDD, recurrent, severe with psychotic features  Unspecified Anxiety Disorder     Psychosocial stressors     Nicotine dependence  Polysubstance abuse  Foreign body sensation in throat        PROBLEM LIST AND MANAGEMENT PLANS     Psychosis: pt counseled  -ContinueGeodon 20 mg po BID-Increase to 40 mg BID starting this evening     Depression: pt counseled  -Resume wellbutrin 150 mg/day starting today     Anxiety: pt counseled  -start vistaril prn     Psychosocial stressors: pt counseled  -SW consulted to assist with resources     Nicotine dependence: pt counseled  -start Nicotine 14 mg patch dermal q day     Polysubstance abuse: pt counseled  -rehab if willing  -Wellbutrin off-label for nicotine dependence  -Consider trial of naltrexone for ETOH cravings/off-label for gambling use disorder-pending further evaluation    Foreign body sensation in throat:  -Patient breathing and swallowing without difficulty.   -Hospital medicine consulted  -Recommend outpatient ENT follow-up    Discussed diagnosis, risks and benefits of proposed treatment vs alternative treatments vs no treatment, potential side effects of these treatments and the inherent unpredictability of treatment. The patient expresses understanding of the above and displays the capacity to agree with this treatment given said understanding. Patient also agrees that, currently, the benefits outweigh the risks and would like to pursue/continue treatment at this time.    DISCHARGE PLANNING  Expected Disposition Plan: rehab    NEED FOR CONTINUED HOSPITALIZATION  Psychiatric illness continues to pose a potential threat to life or bodily function, of self or others, thereby requiring the need for  continued inpatient psychiatric hospitalization: Yes, due to: significant psychotic thought disorder, as evidenced by:  Ongoing concerns with grave disability with patient unable to perform basic feeding, hygiene and dressing activities without significant constant support.    Protective inpatient pyschiatric hospitalization required while a safe disposition plan is enacted: Yes    Patient stabilized and ready for discharge from inpatient psychiatric unit: No      STAFF:   Jarad Whalen NP  Psychiatry

## 2023-12-01 NOTE — ED NOTES
Pt resting in bed, no acute distress noted. Bed in low and locked position. Room secured per PEC protocol. Pt's belongings secured and pt in blue paper scrubs and yellow socks. Pt being directly monitored by sitter at this time. Will continue to monitor.         Pooja Lockhart is a 71 year old female here for  Chief Complaint   Patient presents with   • Office Visit   • Blood Disorder     Anemia   • TREATMENT     Possible venofer   • Cancer     Gastric NET     Denies latex allergy or sensitivity.    Medication verified and med list updated.  PCP and Pharmacy verified.    Social History     Tobacco Use   Smoking Status Never   • Passive exposure: Past   Smokeless Tobacco Never     Advance Directives Filed: Yes    ECOG:   ECOG [12/01/23 1243]   ECOG Performance Status 0       Vitals:    Visit Vitals  BP (!) 163/72 (BP Location: LUE - Left upper extremity, Patient Position: Sitting, Cuff Size: Large Adult)   Pulse 61   Temp 98.6 °F (37 °C) (Oral)   Resp 16   Ht 5' 6\" (1.676 m)   Wt 115.2 kg (253 lb 13.8 oz)   SpO2 95%   BMI 40.97 kg/m²       Height: No.  Ht Readings from Last 1 Encounters:   12/01/23 5' 6\" (1.676 m)     Weight: Yes, shoes off.  Wt Readings from Last 3 Encounters:   12/01/23 115.2 kg (253 lb 13.8 oz)   11/01/23 116.4 kg (256 lb 9.6 oz)   09/06/23 119.3 kg (263 lb)       BMI: Body mass index is 40.97 kg/m².    REVIEW OF SYSTEMS  GENERAL:  Patient denies fevers, chills, change in appetite, weight loss, dizziness, but complains of: headache, night sweats and excessive fatigue  ALLERGIC/IMMUNOLOGIC: Verified allergies: Yes  EYES:  Patient denies significant visual difficulties, double vision, blurred vision  ENT/MOUTH: Patient denies problems with hearing, sore throat, sinus drainage, mouth sores  ENDOCRINE:  Patient denies diabetes, thyroid disease, hormone replacement, hot flashes  HEMATOLOGIC/LYMPHATIC: Patient denies bleeding, tender lymph nodes, swollen lymph nodes, but complains of: easy bruising  BREASTS: Patient denies abnormal masses of breast, nipple discharge, pain  RESPIRATORY:  Patient denies lung pain with breathing, coughing up blood, shortness of breath, but complains of: cough- asthma  CARDIOVASCULAR:  Patient denies anginal chest pain,  palpitations, shortness of breath when lying flat, peripheral edema  GASTROINTESTINAL: Patient denies abdominal pain , nausea, vomiting, GI bleeding, change in bowel habits, heartburn, sensation of feeling full, difficulty swallowing, but complains of: diarrhea, constipation and gas pains  : Patient denies abnormal genital masses, blood in the urine, frequency, urgency, burning with urination, hesitancy, incontinence, vaginal bleeding, discharge  MUSCULOSKELETAL:  Patient denies bone pain, joint swelling, redness, decreased range of motion, but complains of: joint pain, pain ratin, location: back, bilateral rib pain/bruising  SKIN:  Patient denies chronic rashes, inflammation, ulcerations, skin changes, itching  NEUROLOGIC:  Patient denies areas of focal weakness, abnormal gait, sensory problems, numbness, tingling, but complains of: loss of balance- \"wobby\"  PSYCHIATRIC: Patient denies insomnia, depression, anxiety    This patient reported abnormal symptoms that needed immediate verbal communication: Yes, these symptoms include above and were reported to MD.

## 2024-03-28 NOTE — ED NOTES
"pt is calm but restless; gets up requesting to "go out side" pt is encouraged to wait to be seen by ERP for safety; pt complies at ths time  "
Bed: 04main  Expected date:   Expected time:   Means of arrival:   Comments:  
CODE WATCH CALLED OVERHEAD.  
Encourage pt to have po fluids; apple juice x 2 at the bedside; attempt to engage PT re: HPI states he does not want to talk ; attempt to C&R and encourage to call for any needs  
Pt can recall recent PEC and DC from psych facility but states that he does not know where he is; remains tearful; but makes effort to answer and is compliant with assess; attempt to C&R and reorient PT to location; tearful but compliant; states safety concerns and that he is homeless; states he is NOT seeking a PEC at this time  
Home

## 2024-10-23 NOTE — ED NOTES
Pt's belongings:  Shorts  Shirt  Socks  Shoes  B     61yo M pmh HTN, HLD, DM, CVA w residual R sided deficits, p/w R upper flank pain with radiaiton to R upper back worse with ambulating, and R foot pain "when he takes a step". pt ambulating with R side limp,  R side ext 3/5 strength (pt reports that is his baseline). no midline ttp. unable to perform complete PE of lower ext as pt refused to remove shoes. pt reports "shoes are difficulty to get back on". ordered toradol, flexiril and lidocaine patch. will reassess Render Risk Assessment In Note?: no Detail Level: Zone 61yo M pmh HTN, HLD, DM, CVA w residual R sided deficits, p/w R upper flank pain with radiaiton to R upper back worse with ambulating, and R foot pain "when he takes a step". pt ambulating with R side limp,  R side ext 3/5 strength (pt reports that is his baseline). no midline ttp. unable to perform complete PE of lower ext as pt refused to remove shoes. pt reports "shoes are difficulty to get back on". ordered toradol, flexeril and lidocaine patch. will reassess

## 2025-02-05 ENCOUNTER — HOSPITAL ENCOUNTER (EMERGENCY)
Facility: HOSPITAL | Age: 30
Discharge: PSYCHIATRIC OR SUBSTANCE USE FACILITY | End: 2025-02-06
Attending: STUDENT IN AN ORGANIZED HEALTH CARE EDUCATION/TRAINING PROGRAM
Payer: MEDICAID

## 2025-02-05 DIAGNOSIS — Z00.8 MEDICAL CLEARANCE FOR PSYCHIATRIC ADMISSION: ICD-10-CM

## 2025-02-05 DIAGNOSIS — R44.0 AUDITORY HALLUCINATIONS: ICD-10-CM

## 2025-02-05 DIAGNOSIS — R45.851 SUICIDAL IDEATION: Primary | ICD-10-CM

## 2025-02-05 LAB
BASOPHILS # BLD AUTO: 0.03 K/UL (ref 0–0.2)
BASOPHILS NFR BLD: 0.4 % (ref 0–1.9)
BILIRUB UR QL STRIP: NEGATIVE
CLARITY UR: CLEAR
COLOR UR: YELLOW
DIFFERENTIAL METHOD BLD: ABNORMAL
EOSINOPHIL # BLD AUTO: 0.2 K/UL (ref 0–0.5)
EOSINOPHIL NFR BLD: 1.9 % (ref 0–8)
ERYTHROCYTE [DISTWIDTH] IN BLOOD BY AUTOMATED COUNT: 12.7 % (ref 11.5–14.5)
GLUCOSE UR QL STRIP: NEGATIVE
HCT VFR BLD AUTO: 39.8 % (ref 40–54)
HGB BLD-MCNC: 12.9 G/DL (ref 14–18)
HGB UR QL STRIP: NEGATIVE
IMM GRANULOCYTES # BLD AUTO: 0.02 K/UL (ref 0–0.04)
IMM GRANULOCYTES NFR BLD AUTO: 0.3 % (ref 0–0.5)
KETONES UR QL STRIP: NEGATIVE
LEUKOCYTE ESTERASE UR QL STRIP: NEGATIVE
LYMPHOCYTES # BLD AUTO: 3.1 K/UL (ref 1–4.8)
LYMPHOCYTES NFR BLD: 38.8 % (ref 18–48)
MCH RBC QN AUTO: 29.4 PG (ref 27–31)
MCHC RBC AUTO-ENTMCNC: 32.4 G/DL (ref 32–36)
MCV RBC AUTO: 91 FL (ref 82–98)
MONOCYTES # BLD AUTO: 0.6 K/UL (ref 0.3–1)
MONOCYTES NFR BLD: 6.9 % (ref 4–15)
NEUTROPHILS # BLD AUTO: 4.1 K/UL (ref 1.8–7.7)
NEUTROPHILS NFR BLD: 51.7 % (ref 38–73)
NITRITE UR QL STRIP: NEGATIVE
NRBC BLD-RTO: 0 /100 WBC
PH UR STRIP: 6 [PH] (ref 5–8)
PLATELET # BLD AUTO: 289 K/UL (ref 150–450)
PMV BLD AUTO: 9.4 FL (ref 9.2–12.9)
PROT UR QL STRIP: NEGATIVE
RBC # BLD AUTO: 4.39 M/UL (ref 4.6–6.2)
SP GR UR STRIP: 1.03 (ref 1–1.03)
URN SPEC COLLECT METH UR: NORMAL
UROBILINOGEN UR STRIP-ACNC: NEGATIVE EU/DL
WBC # BLD AUTO: 7.97 K/UL (ref 3.9–12.7)

## 2025-02-05 PROCEDURE — 99285 EMERGENCY DEPT VISIT HI MDM: CPT

## 2025-02-05 PROCEDURE — 80143 DRUG ASSAY ACETAMINOPHEN: CPT | Performed by: STUDENT IN AN ORGANIZED HEALTH CARE EDUCATION/TRAINING PROGRAM

## 2025-02-05 PROCEDURE — 84443 ASSAY THYROID STIM HORMONE: CPT | Performed by: STUDENT IN AN ORGANIZED HEALTH CARE EDUCATION/TRAINING PROGRAM

## 2025-02-05 PROCEDURE — 82077 ASSAY SPEC XCP UR&BREATH IA: CPT | Performed by: STUDENT IN AN ORGANIZED HEALTH CARE EDUCATION/TRAINING PROGRAM

## 2025-02-05 PROCEDURE — 80053 COMPREHEN METABOLIC PANEL: CPT | Performed by: STUDENT IN AN ORGANIZED HEALTH CARE EDUCATION/TRAINING PROGRAM

## 2025-02-05 PROCEDURE — 81003 URINALYSIS AUTO W/O SCOPE: CPT | Mod: 59 | Performed by: STUDENT IN AN ORGANIZED HEALTH CARE EDUCATION/TRAINING PROGRAM

## 2025-02-05 PROCEDURE — 80307 DRUG TEST PRSMV CHEM ANLYZR: CPT | Performed by: STUDENT IN AN ORGANIZED HEALTH CARE EDUCATION/TRAINING PROGRAM

## 2025-02-05 PROCEDURE — 85025 COMPLETE CBC W/AUTO DIFF WBC: CPT | Performed by: STUDENT IN AN ORGANIZED HEALTH CARE EDUCATION/TRAINING PROGRAM

## 2025-02-06 VITALS
RESPIRATION RATE: 18 BRPM | OXYGEN SATURATION: 96 % | SYSTOLIC BLOOD PRESSURE: 118 MMHG | HEART RATE: 87 BPM | DIASTOLIC BLOOD PRESSURE: 52 MMHG | TEMPERATURE: 98 F

## 2025-02-06 LAB
ALBUMIN SERPL BCP-MCNC: 4.2 G/DL (ref 3.5–5.2)
ALP SERPL-CCNC: 59 U/L (ref 40–150)
ALT SERPL W/O P-5'-P-CCNC: 13 U/L (ref 10–44)
AMPHET+METHAMPHET UR QL: NEGATIVE
ANION GAP SERPL CALC-SCNC: 9 MMOL/L (ref 8–16)
APAP SERPL-MCNC: <3 UG/ML (ref 10–20)
AST SERPL-CCNC: 17 U/L (ref 10–40)
BARBITURATES UR QL SCN>200 NG/ML: NEGATIVE
BENZODIAZ UR QL SCN>200 NG/ML: NEGATIVE
BILIRUB SERPL-MCNC: 0.3 MG/DL (ref 0.1–1)
BUN SERPL-MCNC: 11 MG/DL (ref 6–20)
BZE UR QL SCN: NEGATIVE
CALCIUM SERPL-MCNC: 9 MG/DL (ref 8.7–10.5)
CANNABINOIDS UR QL SCN: ABNORMAL
CHLORIDE SERPL-SCNC: 111 MMOL/L (ref 95–110)
CO2 SERPL-SCNC: 22 MMOL/L (ref 23–29)
CREAT SERPL-MCNC: 1 MG/DL (ref 0.5–1.4)
CREAT UR-MCNC: 299.2 MG/DL (ref 23–375)
EST. GFR  (NO RACE VARIABLE): >60 ML/MIN/1.73 M^2
ETHANOL SERPL-MCNC: <10 MG/DL
GLUCOSE SERPL-MCNC: 113 MG/DL (ref 70–110)
METHADONE UR QL SCN>300 NG/ML: NEGATIVE
OPIATES UR QL SCN: NEGATIVE
PCP UR QL SCN>25 NG/ML: NEGATIVE
POTASSIUM SERPL-SCNC: 3.8 MMOL/L (ref 3.5–5.1)
PROT SERPL-MCNC: 7.8 G/DL (ref 6–8.4)
SODIUM SERPL-SCNC: 142 MMOL/L (ref 136–145)
TOXICOLOGY INFORMATION: ABNORMAL
TSH SERPL DL<=0.005 MIU/L-ACNC: 0.69 UIU/ML (ref 0.4–4)

## 2025-02-06 PROCEDURE — 99215 OFFICE O/P EST HI 40 MIN: CPT | Mod: 95,AF,HB, | Performed by: PSYCHIATRY & NEUROLOGY

## 2025-02-06 RX ORDER — HYDROXYZINE PAMOATE 50 MG/1
50 CAPSULE ORAL EVERY 6 HOURS PRN
COMMUNITY
Start: 2025-01-17

## 2025-02-06 RX ORDER — PALIPERIDONE 6 MG/1
1 TABLET, EXTENDED RELEASE ORAL 3 TIMES DAILY
COMMUNITY

## 2025-02-06 RX ORDER — DIVALPROEX SODIUM 500 MG/1
1 TABLET, FILM COATED, EXTENDED RELEASE ORAL 2 TIMES DAILY
COMMUNITY
Start: 2025-01-09 | End: 2025-03-10

## 2025-02-06 RX ORDER — QUETIAPINE FUMARATE 50 MG/1
50 TABLET, FILM COATED ORAL NIGHTLY PRN
COMMUNITY
Start: 2024-10-03
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
## 2025-02-06 NOTE — ED NOTES
Pt. Transferred to [facility] . Called [facility] [place], [place] spoke with Shanon

## 2025-02-06 NOTE — ED NOTES
Patient belongings : jacket, socks, shoes, red hat, cigarettes, lighter, cards, ID, kaity packet, phone , phone, medication.